# Patient Record
Sex: FEMALE | Race: WHITE | NOT HISPANIC OR LATINO | URBAN - METROPOLITAN AREA
[De-identification: names, ages, dates, MRNs, and addresses within clinical notes are randomized per-mention and may not be internally consistent; named-entity substitution may affect disease eponyms.]

---

## 2018-07-30 ENCOUNTER — EMERGENCY (EMERGENCY)
Facility: HOSPITAL | Age: 73
LOS: 0 days | Discharge: HOME | End: 2018-07-30
Attending: EMERGENCY MEDICINE | Admitting: EMERGENCY MEDICINE
Payer: MEDICARE

## 2018-07-30 VITALS
TEMPERATURE: 98 F | SYSTOLIC BLOOD PRESSURE: 180 MMHG | OXYGEN SATURATION: 100 % | HEART RATE: 62 BPM | RESPIRATION RATE: 17 BRPM | DIASTOLIC BLOOD PRESSURE: 60 MMHG

## 2018-07-30 VITALS — WEIGHT: 139.99 LBS | HEIGHT: 62 IN

## 2018-07-30 DIAGNOSIS — I10 ESSENTIAL (PRIMARY) HYPERTENSION: ICD-10-CM

## 2018-07-30 DIAGNOSIS — M54.9 DORSALGIA, UNSPECIFIED: ICD-10-CM

## 2018-07-30 DIAGNOSIS — M48.54XA COLLAPSED VERTEBRA, NOT ELSEWHERE CLASSIFIED, THORACIC REGION, INITIAL ENCOUNTER FOR FRACTURE: ICD-10-CM

## 2018-07-30 PROCEDURE — 72100 X-RAY EXAM L-S SPINE 2/3 VWS: CPT | Mod: 26

## 2018-07-30 PROCEDURE — 99283 EMERGENCY DEPT VISIT LOW MDM: CPT

## 2018-07-30 PROCEDURE — 72070 X-RAY EXAM THORAC SPINE 2VWS: CPT | Mod: 26

## 2018-07-30 RX ORDER — ACETAMINOPHEN 500 MG
1 TABLET ORAL
Qty: 15 | Refills: 0
Start: 2018-07-30 | End: 2018-08-03

## 2018-07-30 RX ORDER — CYCLOBENZAPRINE HYDROCHLORIDE 10 MG/1
1 TABLET, FILM COATED ORAL
Qty: 15 | Refills: 0
Start: 2018-07-30

## 2018-07-30 RX ORDER — CYCLOBENZAPRINE HYDROCHLORIDE 10 MG/1
5 TABLET, FILM COATED ORAL ONCE
Qty: 0 | Refills: 0 | Status: COMPLETED | OUTPATIENT
Start: 2018-07-30 | End: 2018-07-30

## 2018-07-30 RX ORDER — ACETAMINOPHEN 500 MG
650 TABLET ORAL ONCE
Qty: 0 | Refills: 0 | Status: COMPLETED | OUTPATIENT
Start: 2018-07-30 | End: 2018-07-30

## 2018-07-30 RX ADMIN — CYCLOBENZAPRINE HYDROCHLORIDE 5 MILLIGRAM(S): 10 TABLET, FILM COATED ORAL at 13:57

## 2018-07-30 RX ADMIN — Medication 650 MILLIGRAM(S): at 13:57

## 2018-07-30 NOTE — ED ADULT NURSE NOTE - CHPI ED SYMPTOMS NEG
no constipation/no fatigue/no difficulty bearing weight/no motor function loss/no neck tenderness/no anorexia/no bowel dysfunction/no bladder dysfunction/no numbness

## 2018-07-30 NOTE — ED STATDOCS - PROGRESS NOTE DETAILS
72 y/o F with PMH of HTN presents with mid-low back pain x 3 days. Pt states she first felt the pain when getting out of bed 3 days ago. No recent fall. Pain not made worse with inspiration. Denies CP, SOb, palpitations, numbness/tingling in extremities. Admits to chronic right leg pain. Pain relieved by standing, made worse with motion. Admits to having recent DEXA scan which she reports as normal. PE: NAD. Cardiac: s1/s2, RRR. Lungs: CTAB. Abdomen: soft, nontender, NBS x4. MSK: No obvious deformity, edema, erythema, ecchymosis to T or L spine. +left sided lumbar paraspinal tenderness without midline tenderness. Neuro. Sensation intact to light touch in trunk and lower extremities. Patient ambulatory with 5/5 lower extremity strength. A/P: Likely MSK. Will evaluate for compression fracture. XR thoracic and lumbar spine. Tylenol, flexeril. Reassess. Damon Jamison PA-C d/w findings with Dr. Lee. Advised bracing, pain management, follow up in office on 8/2/18. Brace currently unavailable in ED. Repeat page to Dr. Lee regarding disposition due to unavailability of brace. - Damon Jamison PA-C Unable to provide brace while in ED. D/w Dr. Lee. Stated to disposition patient home. Advised no strenuous activity, heavy lifting while at home. Follow up on 8/2 in office. - Damon Jamison PA-C

## 2018-07-30 NOTE — ED STATDOCS - ATTENDING CONTRIBUTION TO CARE
I, Bret Simms DO,  performed the initial face to face bedside interview with this patient regarding history of present illness, review of symptoms and relevant past medical, social and family history.  I completed an independent physical examination.  I was the initial provider who evaluated this patient. I have signed out the follow up of any pending tests (i.e. labs, radiological studies) to the ACP.  I have communicated the patient’s plan of care and disposition with the ACP.

## 2018-07-30 NOTE — ED STATDOCS - NS_ ATTENDINGSCRIBEDETAILS _ED_A_ED_FT
Bret Simms DO (Attending): The history, relevant review of systems, past medical and surgical history, medical decision making, and physical examination was documented by the scribe in my presence and I attest to the accuracy of the documentation.

## 2018-07-30 NOTE — ED STATDOCS - MUSCULOSKELETAL, MLM
mild right low thoracic/upper lumbar paraspinal spasm, no midline TTP, normal gait, strength 5/5 neuro intact

## 2018-07-30 NOTE — SBIRT NOTE. - NSSBIRTSERVICES_GEN_A_ED_FT
Provided SBIRT services: Full screen positive. Brief Intervention Performed. Screening results were reviewed with the patient and patient was provided information about healthy guidelines and potential negative consequences associated with level of risk. Motivation and readiness to reduce or stop use was discussed and goals and activities to make changes were suggested/offered.  Audit Score: 8  DAST Score: 0

## 2018-07-30 NOTE — ED STATDOCS - OBJECTIVE STATEMENT
72 y/o female with a PMHx of  HTN presents to the ED c/o back pain x3 days, worsening. Denies recent injury. Denies CP, SOB, fever, dysuria, LE edema. Pt has had similar sx in past. Pt also notes right leg pain x months, which she has not seen her doctor for. Back pain is relieved by standing. Pt took Advil this morning PTA. PMD- Dr. Kevin.

## 2018-09-18 PROBLEM — Z00.00 ENCOUNTER FOR PREVENTIVE HEALTH EXAMINATION: Status: ACTIVE | Noted: 2018-09-18

## 2018-09-18 PROBLEM — I10 ESSENTIAL (PRIMARY) HYPERTENSION: Chronic | Status: ACTIVE | Noted: 2018-08-09

## 2018-11-07 ENCOUNTER — APPOINTMENT (OUTPATIENT)
Dept: PHYSICAL MEDICINE AND REHAB | Facility: CLINIC | Age: 73
End: 2018-11-07

## 2019-01-23 ENCOUNTER — INPATIENT (INPATIENT)
Facility: HOSPITAL | Age: 74
LOS: 1 days | Discharge: HOME | End: 2019-01-25
Attending: FAMILY MEDICINE | Admitting: FAMILY MEDICINE
Payer: MEDICARE

## 2019-01-23 VITALS — HEIGHT: 62 IN | WEIGHT: 136.91 LBS

## 2019-01-23 LAB
ADD ON TEST-SPECIMEN IN LAB: SIGNIFICANT CHANGE UP
ALBUMIN SERPL ELPH-MCNC: 3.1 G/DL — LOW (ref 3.3–5)
ALP SERPL-CCNC: 105 U/L — SIGNIFICANT CHANGE UP (ref 40–120)
ALT FLD-CCNC: 35 U/L — SIGNIFICANT CHANGE UP (ref 12–78)
AMYLASE P1 CFR SERPL: 37 U/L — SIGNIFICANT CHANGE UP (ref 25–115)
ANION GAP SERPL CALC-SCNC: 13 MMOL/L — SIGNIFICANT CHANGE UP (ref 5–17)
APPEARANCE UR: CLEAR — SIGNIFICANT CHANGE UP
AST SERPL-CCNC: 42 U/L — HIGH (ref 15–37)
BACTERIA # UR AUTO: ABNORMAL
BASOPHILS # BLD AUTO: 0.06 K/UL — SIGNIFICANT CHANGE UP (ref 0–0.2)
BASOPHILS NFR BLD AUTO: 0.5 % — SIGNIFICANT CHANGE UP (ref 0–2)
BILIRUB SERPL-MCNC: 0.8 MG/DL — SIGNIFICANT CHANGE UP (ref 0.2–1.2)
BILIRUB UR-MCNC: NEGATIVE — SIGNIFICANT CHANGE UP
BUN SERPL-MCNC: 61 MG/DL — HIGH (ref 7–23)
CALCIUM SERPL-MCNC: 9.1 MG/DL — SIGNIFICANT CHANGE UP (ref 8.5–10.1)
CHLORIDE SERPL-SCNC: 100 MMOL/L — SIGNIFICANT CHANGE UP (ref 96–108)
CK SERPL-CCNC: 732 U/L — HIGH (ref 26–192)
CO2 SERPL-SCNC: 19 MMOL/L — LOW (ref 22–31)
COLOR SPEC: YELLOW — SIGNIFICANT CHANGE UP
COMMENT - URINE: SIGNIFICANT CHANGE UP
CREAT SERPL-MCNC: 2.87 MG/DL — HIGH (ref 0.5–1.3)
DIFF PNL FLD: ABNORMAL
EOSINOPHIL # BLD AUTO: 0.22 K/UL — SIGNIFICANT CHANGE UP (ref 0–0.5)
EOSINOPHIL NFR BLD AUTO: 1.7 % — SIGNIFICANT CHANGE UP (ref 0–6)
EPI CELLS # UR: SIGNIFICANT CHANGE UP
GLUCOSE SERPL-MCNC: 120 MG/DL — HIGH (ref 70–99)
GLUCOSE UR QL: 50 MG/DL
HCT VFR BLD CALC: 31.3 % — LOW (ref 34.5–45)
HGB BLD-MCNC: 10.4 G/DL — LOW (ref 11.5–15.5)
IMM GRANULOCYTES NFR BLD AUTO: 0.5 % — SIGNIFICANT CHANGE UP (ref 0–1.5)
INR BLD: 1.13 RATIO — SIGNIFICANT CHANGE UP (ref 0.88–1.16)
KETONES UR-MCNC: NEGATIVE — SIGNIFICANT CHANGE UP
LEUKOCYTE ESTERASE UR-ACNC: NEGATIVE — SIGNIFICANT CHANGE UP
LIDOCAIN IGE QN: 221 U/L — SIGNIFICANT CHANGE UP (ref 73–393)
LYMPHOCYTES # BLD AUTO: 1.51 K/UL — SIGNIFICANT CHANGE UP (ref 1–3.3)
LYMPHOCYTES # BLD AUTO: 11.8 % — LOW (ref 13–44)
MAGNESIUM SERPL-MCNC: 1.6 MG/DL — SIGNIFICANT CHANGE UP (ref 1.6–2.6)
MCHC RBC-ENTMCNC: 30.4 PG — SIGNIFICANT CHANGE UP (ref 27–34)
MCHC RBC-ENTMCNC: 33.2 GM/DL — SIGNIFICANT CHANGE UP (ref 32–36)
MCV RBC AUTO: 91.5 FL — SIGNIFICANT CHANGE UP (ref 80–100)
MONOCYTES # BLD AUTO: 1.15 K/UL — HIGH (ref 0–0.9)
MONOCYTES NFR BLD AUTO: 9 % — SIGNIFICANT CHANGE UP (ref 2–14)
NEUTROPHILS # BLD AUTO: 9.74 K/UL — HIGH (ref 1.8–7.4)
NEUTROPHILS NFR BLD AUTO: 76.5 % — SIGNIFICANT CHANGE UP (ref 43–77)
NITRITE UR-MCNC: NEGATIVE — SIGNIFICANT CHANGE UP
NRBC # BLD: 0 /100 WBCS — SIGNIFICANT CHANGE UP (ref 0–0)
PH UR: 6 — SIGNIFICANT CHANGE UP (ref 5–8)
PHOSPHATE SERPL-MCNC: 4.6 MG/DL — HIGH (ref 2.5–4.5)
PLATELET # BLD AUTO: 238 K/UL — SIGNIFICANT CHANGE UP (ref 150–400)
POTASSIUM SERPL-MCNC: 3.9 MMOL/L — SIGNIFICANT CHANGE UP (ref 3.5–5.3)
POTASSIUM SERPL-SCNC: 3.9 MMOL/L — SIGNIFICANT CHANGE UP (ref 3.5–5.3)
PROT SERPL-MCNC: 7.7 GM/DL — SIGNIFICANT CHANGE UP (ref 6–8.3)
PROT UR-MCNC: 30 MG/DL
PROTHROM AB SERPL-ACNC: 12.6 SEC — SIGNIFICANT CHANGE UP (ref 10–12.9)
RBC # BLD: 3.42 M/UL — LOW (ref 3.8–5.2)
RBC # FLD: 12.4 % — SIGNIFICANT CHANGE UP (ref 10.3–14.5)
RBC CASTS # UR COMP ASSIST: ABNORMAL /HPF (ref 0–4)
SODIUM SERPL-SCNC: 132 MMOL/L — LOW (ref 135–145)
SP GR SPEC: 1.01 — SIGNIFICANT CHANGE UP (ref 1.01–1.02)
UROBILINOGEN FLD QL: NEGATIVE MG/DL — SIGNIFICANT CHANGE UP
WBC # BLD: 12.75 K/UL — HIGH (ref 3.8–10.5)
WBC # FLD AUTO: 12.75 K/UL — HIGH (ref 3.8–10.5)
WBC UR QL: ABNORMAL

## 2019-01-23 PROCEDURE — 71045 X-RAY EXAM CHEST 1 VIEW: CPT | Mod: 26

## 2019-01-23 PROCEDURE — 74019 RADEX ABDOMEN 2 VIEWS: CPT | Mod: 26

## 2019-01-23 PROCEDURE — 99285 EMERGENCY DEPT VISIT HI MDM: CPT

## 2019-01-23 PROCEDURE — 76705 ECHO EXAM OF ABDOMEN: CPT | Mod: 26

## 2019-01-23 RX ORDER — MORPHINE SULFATE 50 MG/1
2 CAPSULE, EXTENDED RELEASE ORAL EVERY 4 HOURS
Qty: 0 | Refills: 0 | Status: DISCONTINUED | OUTPATIENT
Start: 2019-01-23 | End: 2019-01-25

## 2019-01-23 RX ORDER — OXYCODONE AND ACETAMINOPHEN 5; 325 MG/1; MG/1
1 TABLET ORAL ONCE
Qty: 0 | Refills: 0 | Status: DISCONTINUED | OUTPATIENT
Start: 2019-01-23 | End: 2019-01-23

## 2019-01-23 RX ORDER — ENOXAPARIN SODIUM 100 MG/ML
40 INJECTION SUBCUTANEOUS EVERY 24 HOURS
Qty: 0 | Refills: 0 | Status: DISCONTINUED | OUTPATIENT
Start: 2019-01-23 | End: 2019-01-23

## 2019-01-23 RX ORDER — METOPROLOL TARTRATE 50 MG
100 TABLET ORAL DAILY
Qty: 0 | Refills: 0 | Status: DISCONTINUED | OUTPATIENT
Start: 2019-01-23 | End: 2019-01-25

## 2019-01-23 RX ORDER — HEPARIN SODIUM 5000 [USP'U]/ML
5000 INJECTION INTRAVENOUS; SUBCUTANEOUS EVERY 12 HOURS
Qty: 0 | Refills: 0 | Status: DISCONTINUED | OUTPATIENT
Start: 2019-01-23 | End: 2019-01-25

## 2019-01-23 RX ORDER — ONDANSETRON 8 MG/1
4 TABLET, FILM COATED ORAL EVERY 6 HOURS
Qty: 0 | Refills: 0 | Status: DISCONTINUED | OUTPATIENT
Start: 2019-01-23 | End: 2019-01-25

## 2019-01-23 RX ORDER — CYCLOBENZAPRINE HYDROCHLORIDE 10 MG/1
5 TABLET, FILM COATED ORAL THREE TIMES A DAY
Qty: 0 | Refills: 0 | Status: DISCONTINUED | OUTPATIENT
Start: 2019-01-23 | End: 2019-01-25

## 2019-01-23 RX ORDER — LANOLIN ALCOHOL/MO/W.PET/CERES
3 CREAM (GRAM) TOPICAL ONCE
Qty: 0 | Refills: 0 | Status: COMPLETED | OUTPATIENT
Start: 2019-01-23 | End: 2019-01-23

## 2019-01-23 RX ORDER — SODIUM CHLORIDE 9 MG/ML
1000 INJECTION INTRAMUSCULAR; INTRAVENOUS; SUBCUTANEOUS ONCE
Qty: 0 | Refills: 0 | Status: COMPLETED | OUTPATIENT
Start: 2019-01-23 | End: 2019-01-23

## 2019-01-23 RX ORDER — ACETAMINOPHEN 500 MG
650 TABLET ORAL EVERY 6 HOURS
Qty: 0 | Refills: 0 | Status: DISCONTINUED | OUTPATIENT
Start: 2019-01-23 | End: 2019-01-25

## 2019-01-23 RX ORDER — SODIUM CHLORIDE 9 MG/ML
1000 INJECTION INTRAMUSCULAR; INTRAVENOUS; SUBCUTANEOUS
Qty: 0 | Refills: 0 | Status: DISCONTINUED | OUTPATIENT
Start: 2019-01-23 | End: 2019-01-24

## 2019-01-23 RX ADMIN — MORPHINE SULFATE 2 MILLIGRAM(S): 50 CAPSULE, EXTENDED RELEASE ORAL at 18:44

## 2019-01-23 RX ADMIN — OXYCODONE AND ACETAMINOPHEN 1 TABLET(S): 5; 325 TABLET ORAL at 15:47

## 2019-01-23 RX ADMIN — MORPHINE SULFATE 2 MILLIGRAM(S): 50 CAPSULE, EXTENDED RELEASE ORAL at 23:37

## 2019-01-23 RX ADMIN — OXYCODONE AND ACETAMINOPHEN 1 TABLET(S): 5; 325 TABLET ORAL at 16:17

## 2019-01-23 RX ADMIN — Medication 100 MILLIGRAM(S): at 23:37

## 2019-01-23 RX ADMIN — SODIUM CHLORIDE 1000 MILLILITER(S): 9 INJECTION INTRAMUSCULAR; INTRAVENOUS; SUBCUTANEOUS at 10:07

## 2019-01-23 RX ADMIN — SODIUM CHLORIDE 1000 MILLILITER(S): 9 INJECTION INTRAMUSCULAR; INTRAVENOUS; SUBCUTANEOUS at 11:08

## 2019-01-23 NOTE — ED ADULT NURSE REASSESSMENT NOTE - NS ED NURSE REASSESS COMMENT FT1
Patient report pain starting to come back. Morphine given. patient states that she does not want menu for dinner a this time. Will continue to monitor.

## 2019-01-23 NOTE — H&P ADULT - HISTORY OF PRESENT ILLNESS
Patient is a 73 y/o female with PMHx of HTN p/w abdominal pains and outpatient  labs inidcating rising renal function. Patient had abdominal pains starting 3 days ago, went to see Dr. Kevin 2 days ago for abdominal pains. She was sent for outpatient CT abdominal which showed no acute findings. However her blood work showed elevated renal function, and patient was adivsed to come to ER for further workup. Pt also reports intermittent NVD, and poor PO intake. +Weakness. +urinary urgency with incomplete emptying. Reports drinking 1 small bottle a day. Denies hematuria, new back pain, fever. Patient is a 73 y/o female with PMHx of HTN p/w abdominal pains and outpatient  labs inidcating rising renal function. Patient had abdominal pains starting 3 days ago, went to see Dr. Kevin 2 days ago for abdominal pains. Patient states initally these syptoms started 12/25, and since then intermittent vague abdominal pains. Sometimes, ruq, and sometimes luq. She was sent for outpatient CT abdominal which showed no acute findings. However her blood work showed elevated renal function, and patient was adivsed to come to ER for further workup. Pt also reports intermittent NVD, and poor PO intake. +urinary urgency with incomplete emptying. States she has no appetite and is continuously nauseated and has not been drinking enough water. She denies any fevers, chills, chest pain, sob.

## 2019-01-23 NOTE — ED ADULT TRIAGE NOTE - CHIEF COMPLAINT QUOTE
patient sent by dr mauro for evaluation of abnormal labs.  Patient was prescreened for CT scan of abdomen and told kidney function was abnormal and to come to ER.  Patient has had nausea, abdominal pain and anorexia since [place].  She denies fever, + weakness and dry heaves.  Patient has been taking Tylenol and aleve for the abdominal pain with some relief.  Last dose 3 am

## 2019-01-23 NOTE — H&P ADULT - ASSESSMENT
Patient is a 75 y/o female with PMHx of HTN p/w abdominal pains and outpatient  labs inidcating rising renal function. Patient had abdominal pains starting 3 days ago, went to see Dr. Kevin 2 days ago for abdominal pains. She was sent for outpatient CT abdominal which showed no acute findings. However her blood work showed elevated renal function, and patient was adivsed to come to ER for further workup. Pt also reports intermittent NVD, and poor PO intake. +Weakness. +urinary urgency with incomplete emptying. Reports drinking 1 small bottle a day. Denies hematuria, new back pain, fever. Patient is a 73 y/o female with PMHx of HTN p/w abdominal pains and outpatient  labs inidcating rising renal function. Patient had abdominal pains starting 3 days ago, went to see Dr. Kevin 2 days ago for abdominal pains. Patient states initally these syptoms started 12/25, and since then intermittent vague abdominal pains. Sometimes, ruq, and sometimes luq. She was sent for outpatient CT abdominal which showed no acute findings. However her blood work showed elevated renal function, and patient was adivsed to come to ER for further workup. Pt also reports intermittent NVD, and poor PO intake. +urinary urgency with incomplete emptying. States she has no appetite and is continuously nauseated and has not been drinking enough water. She denies any fevers, chills, chest pain, sob.     Found to have creatine outpatient of 3.8. Current 2.87. WBC of 12.75.     1-Abdominal pain possibly seconary to passed stone vs gastric etiology- ulcer or mass?  -patient will likley need EGD, either in this admission or in near future  -Dr. Godoy to consult  -CT outpatient was unremarkable/ US in ER was unremarkable  -will order xray 2 view r/o sbo or illeus (very poor gi motility sounds)    2-ARF  -improved already since outpatient blood work  -start IV fluids ns@100cc/hr for the next 24 hours    3-HTN:  -hold ace arb  -c/w lopressor    4-DVT prophy- sc heparin Patient is a 75 y/o female with PMHx of HTN p/w abdominal pains and outpatient  labs inidcating rising renal function. Patient had abdominal pains starting 3 days ago, went to see Dr. Kevin 2 days ago for abdominal pains. Patient states initally these syptoms started 12/25, and since then intermittent vague abdominal pains. Sometimes, ruq, and sometimes luq. She was sent for outpatient CT abdominal which showed no acute findings. However her blood work showed elevated renal function, and patient was adivsed to come to ER for further workup. Pt also reports intermittent NVD, and poor PO intake. +urinary urgency with incomplete emptying. States she has no appetite and is continuously nauseated and has not been drinking enough water. She denies any fevers, chills, chest pain, sob.     Found to have creatine outpatient of 3.8. Current 2.87. WBC of 12.75.     1-Abdominal pain possibly seconary to passed stone vs gastric etiology- ulcer or mass?  -patient will likley need EGD, either in this admission or in near future  -Dr. Godoy to consult  -CT outpatient was unremarkable/ US in ER was unremarkable  -will order xray 2 view r/o sbo or illeus (very poor gi motility sounds)    2-ARF  -improved already since outpatient blood work  -start IV fluids ns@100cc/hr for the next 24 hours  -note: patient has been taking oral ibuprophen, aleve at home daily for pain    3-HTN:  -hold ace arb  -c/w lopressor    4-DVT prophy- sc heparin

## 2019-01-23 NOTE — H&P ADULT - NSHPLABSRESULTS_GEN_ALL_CORE
Urinalysis Basic - ( 2019 13:08 )    Color: Yellow / Appearance: Clear / S.015 / pH: x  Gluc: x / Ketone: Negative  / Bili: Negative / Urobili: Negative mg/dL   Blood: x / Protein: 30 mg/dL / Nitrite: Negative   Leuk Esterase: Negative / RBC: 3-5 /HPF / WBC 6-10   Sq Epi: x / Non Sq Epi: Few / Bacteria: Few    2019 10:30    132    |  100    |  61     ----------------------------<  120    3.9     |  19     |  2.87     Ca    9.1        2019 10:30  Phos  4.6       2019 10:30  Mg     1.6       2019 10:30    TPro  7.7    /  Alb  3.1    /  TBili  0.8    /  DBili  x      /  AST  42     /  ALT  35     /  AlkPhos  105    2019 10:30  LIVER FUNCTIONS - ( 2019 10:30 )  Alb: 3.1 g/dL / Pro: 7.7 gm/dL / ALK PHOS: 105 U/L / ALT: 35 U/L / AST: 42 U/L / GGT: x         PT/INR - ( 2019 10:30 )   PT: 12.6 sec;   INR: 1.13 ratio         CBC Full  -  ( 2019 10:30 )  WBC Count : 12.75 K/uL  Hemoglobin : 10.4 g/dL  Hematocrit : 31.3 %  Platelet Count - Automated : 238 K/uL  Mean Cell Volume : 91.5 fl  Mean Cell Hemoglobin : 30.4 pg  Mean Cell Hemoglobin Concentration : 33.2 gm/dL  Auto Neutrophil # : 9.74 K/uL  Auto Lymphocyte # : 1.51 K/uL  Auto Monocyte # : 1.15 K/uL  Auto Eosinophil # : 0.22 K/uL  Auto Basophil # : 0.06 K/uL  Auto Neutrophil % : 76.5 %  Auto Lymphocyte % : 11.8 %  Auto Monocyte % : 9.0 %  Auto Eosinophil % : 1.7 %  Auto Basophil % : 0.5 %  CARDIAC MARKERS ( 2019 10:30 )  x     / x     / 732 U/L / x     / x

## 2019-01-23 NOTE — ED ADULT NURSE NOTE - NSIMPLEMENTINTERV_GEN_ALL_ED
Implemented All Universal Safety Interventions:  [place] to call system. Call bell, personal items and telephone within reach. Instruct patient to call for assistance. Room bathroom lighting operational. Non-slip footwear when patient is off stretcher. Physically safe environment: no spills, clutter or unnecessary equipment. Stretcher in lowest position, wheels locked, appropriate side rails in place.

## 2019-01-23 NOTE — ED PROVIDER NOTE - MEDICAL DECISION MAKING DETAILS
Pt presenting with new renal failure will recheck labs, CPK, fluids, US RUQ due to abd TTP. Will need admission.

## 2019-01-23 NOTE — ED STATDOCS - PROGRESS NOTE DETAILS
75 yo hx of HTN, HLD, presents with CC of poor PO intake.  C/o weakness, fatigue, poor PO intake, lightheadedness.  No real meal since [place], 2/2 nausea, "can't get it down", but denies pain with eating.  Pt was seen by Dr. Kevin had labs drawn and CT abdomen.  Labs showed Cr 3.8, K 6.0, ESR 99, CK 1200.  CT A/P demonstrated GI thickening.  Sent by Dr. Arce office for further evaluation.  .sig

## 2019-01-23 NOTE — ED ADULT NURSE REASSESSMENT NOTE - NS ED NURSE REASSESS COMMENT FT1
report given to Starr RN on [facility]. vss. no s/s of acute distress noted. denies pain. awaiting transport

## 2019-01-23 NOTE — H&P ADULT - NSHPPHYSICALEXAM_GEN_ALL_CORE
Vital Signs Last 24 Hrs  T(C): 36.7 (23 Jan 2019 09:41), Max: 36.7 (23 Jan 2019 09:41)  T(F): 98.1 (23 Jan 2019 09:41), Max: 98.1 (23 Jan 2019 09:41)  HR: 101 (23 Jan 2019 09:41) (101 - 101)  BP: 148/67 (23 Jan 2019 09:41) (148/67 - 148/67)  BP(mean): --  RR: 18 (23 Jan 2019 09:41) (18 - 18)  SpO2: 100% (23 Jan 2019 09:41) (100% - 100%)    HEENT:   pupils equal and reactive, EOMI, no oropharyngeal lesions, erythema, exudates, oral thrush    NECK:   supple, no carotid bruits, no palpable lymph nodes, no thyromegaly    CV:  +S1, +S2, regular, no murmurs or rubs    RESP:   lungs clear to auscultation bilaterally, no wheezing, rales, rhonchi, good air entry bilaterally    BREAST:  not examined    GI:  abdomen soft, non-tender, non-distended, normal BS, no bruits, no abdominal masses, no palpable masses    RECTAL:  not examined    :  not examined    MSK:   normal muscle tone, no atrophy, no rigidity, no contractions    EXT:   no clubbing, no cyanosis, no edema, no calf pain, swelling or erythema    VASCULAR:  pulses equal and symmetric in the upper and lower extremities    NEURO:  AAOX3, no focal neurological deficits, follows all commands, able to move extremities spontaneously    SKIN:  no ulcers, lesions or rashes

## 2019-01-24 LAB
ANION GAP SERPL CALC-SCNC: 9 MMOL/L — SIGNIFICANT CHANGE UP (ref 5–17)
BASOPHILS # BLD AUTO: 0.08 K/UL — SIGNIFICANT CHANGE UP (ref 0–0.2)
BASOPHILS NFR BLD AUTO: 0.8 % — SIGNIFICANT CHANGE UP (ref 0–2)
BUN SERPL-MCNC: 42 MG/DL — HIGH (ref 7–23)
CALCIUM SERPL-MCNC: 8.8 MG/DL — SIGNIFICANT CHANGE UP (ref 8.5–10.1)
CHLORIDE SERPL-SCNC: 105 MMOL/L — SIGNIFICANT CHANGE UP (ref 96–108)
CO2 SERPL-SCNC: 21 MMOL/L — LOW (ref 22–31)
CREAT SERPL-MCNC: 1.96 MG/DL — HIGH (ref 0.5–1.3)
EOSINOPHIL # BLD AUTO: 0.4 K/UL — SIGNIFICANT CHANGE UP (ref 0–0.5)
EOSINOPHIL NFR BLD AUTO: 3.9 % — SIGNIFICANT CHANGE UP (ref 0–6)
GLUCOSE SERPL-MCNC: 103 MG/DL — HIGH (ref 70–99)
HCT VFR BLD CALC: 26 % — LOW (ref 34.5–45)
HCV AB S/CO SERPL IA: 0.11 S/CO — SIGNIFICANT CHANGE UP
HCV AB SERPL-IMP: SIGNIFICANT CHANGE UP
HGB BLD-MCNC: 8.5 G/DL — LOW (ref 11.5–15.5)
IMM GRANULOCYTES NFR BLD AUTO: 0.5 % — SIGNIFICANT CHANGE UP (ref 0–1.5)
LYMPHOCYTES # BLD AUTO: 2.34 K/UL — SIGNIFICANT CHANGE UP (ref 1–3.3)
LYMPHOCYTES # BLD AUTO: 23 % — SIGNIFICANT CHANGE UP (ref 13–44)
MCHC RBC-ENTMCNC: 30.5 PG — SIGNIFICANT CHANGE UP (ref 27–34)
MCHC RBC-ENTMCNC: 32.7 GM/DL — SIGNIFICANT CHANGE UP (ref 32–36)
MCV RBC AUTO: 93.2 FL — SIGNIFICANT CHANGE UP (ref 80–100)
MONOCYTES # BLD AUTO: 1.13 K/UL — HIGH (ref 0–0.9)
MONOCYTES NFR BLD AUTO: 11.1 % — SIGNIFICANT CHANGE UP (ref 2–14)
NEUTROPHILS # BLD AUTO: 6.19 K/UL — SIGNIFICANT CHANGE UP (ref 1.8–7.4)
NEUTROPHILS NFR BLD AUTO: 60.7 % — SIGNIFICANT CHANGE UP (ref 43–77)
NRBC # BLD: 0 /100 WBCS — SIGNIFICANT CHANGE UP (ref 0–0)
PLATELET # BLD AUTO: 218 K/UL — SIGNIFICANT CHANGE UP (ref 150–400)
POTASSIUM SERPL-MCNC: 3.7 MMOL/L — SIGNIFICANT CHANGE UP (ref 3.5–5.3)
POTASSIUM SERPL-SCNC: 3.7 MMOL/L — SIGNIFICANT CHANGE UP (ref 3.5–5.3)
RBC # BLD: 2.79 M/UL — LOW (ref 3.8–5.2)
RBC # FLD: 12.6 % — SIGNIFICANT CHANGE UP (ref 10.3–14.5)
SODIUM SERPL-SCNC: 135 MMOL/L — SIGNIFICANT CHANGE UP (ref 135–145)
WBC # BLD: 10.19 K/UL — SIGNIFICANT CHANGE UP (ref 3.8–10.5)
WBC # FLD AUTO: 10.19 K/UL — SIGNIFICANT CHANGE UP (ref 3.8–10.5)

## 2019-01-24 PROCEDURE — 93010 ELECTROCARDIOGRAM REPORT: CPT

## 2019-01-24 RX ORDER — PANTOPRAZOLE SODIUM 20 MG/1
40 TABLET, DELAYED RELEASE ORAL
Qty: 0 | Refills: 0 | Status: DISCONTINUED | OUTPATIENT
Start: 2019-01-24 | End: 2019-01-25

## 2019-01-24 RX ORDER — SODIUM CHLORIDE 9 MG/ML
1000 INJECTION INTRAMUSCULAR; INTRAVENOUS; SUBCUTANEOUS
Qty: 0 | Refills: 0 | Status: DISCONTINUED | OUTPATIENT
Start: 2019-01-24 | End: 2019-01-25

## 2019-01-24 RX ORDER — SUCRALFATE 1 G
1 TABLET ORAL
Qty: 0 | Refills: 0 | Status: DISCONTINUED | OUTPATIENT
Start: 2019-01-24 | End: 2019-01-24

## 2019-01-24 RX ORDER — SODIUM CHLORIDE 9 MG/ML
1000 INJECTION INTRAMUSCULAR; INTRAVENOUS; SUBCUTANEOUS
Qty: 0 | Refills: 0 | Status: DISCONTINUED | OUTPATIENT
Start: 2019-01-24 | End: 2019-01-24

## 2019-01-24 RX ADMIN — MORPHINE SULFATE 2 MILLIGRAM(S): 50 CAPSULE, EXTENDED RELEASE ORAL at 06:34

## 2019-01-24 RX ADMIN — SODIUM CHLORIDE 100 MILLILITER(S): 9 INJECTION INTRAMUSCULAR; INTRAVENOUS; SUBCUTANEOUS at 11:13

## 2019-01-24 RX ADMIN — SODIUM CHLORIDE 100 MILLILITER(S): 9 INJECTION INTRAMUSCULAR; INTRAVENOUS; SUBCUTANEOUS at 12:32

## 2019-01-24 RX ADMIN — MORPHINE SULFATE 2 MILLIGRAM(S): 50 CAPSULE, EXTENDED RELEASE ORAL at 16:08

## 2019-01-24 RX ADMIN — Medication 650 MILLIGRAM(S): at 12:32

## 2019-01-24 RX ADMIN — PANTOPRAZOLE SODIUM 40 MILLIGRAM(S): 20 TABLET, DELAYED RELEASE ORAL at 17:15

## 2019-01-24 RX ADMIN — Medication 1 GRAM(S): at 12:07

## 2019-01-24 RX ADMIN — HEPARIN SODIUM 5000 UNIT(S): 5000 INJECTION INTRAVENOUS; SUBCUTANEOUS at 06:01

## 2019-01-24 RX ADMIN — MORPHINE SULFATE 2 MILLIGRAM(S): 50 CAPSULE, EXTENDED RELEASE ORAL at 11:00

## 2019-01-24 RX ADMIN — ONDANSETRON 4 MILLIGRAM(S): 8 TABLET, FILM COATED ORAL at 15:10

## 2019-01-24 RX ADMIN — Medication 100 MILLIGRAM(S): at 11:01

## 2019-01-24 RX ADMIN — SODIUM CHLORIDE 100 MILLILITER(S): 9 INJECTION INTRAMUSCULAR; INTRAVENOUS; SUBCUTANEOUS at 12:33

## 2019-01-24 RX ADMIN — Medication 3 MILLIGRAM(S): at 00:27

## 2019-01-24 RX ADMIN — Medication 650 MILLIGRAM(S): at 13:42

## 2019-01-24 RX ADMIN — SODIUM CHLORIDE 100 MILLILITER(S): 9 INJECTION INTRAMUSCULAR; INTRAVENOUS; SUBCUTANEOUS at 00:26

## 2019-01-24 RX ADMIN — MORPHINE SULFATE 2 MILLIGRAM(S): 50 CAPSULE, EXTENDED RELEASE ORAL at 18:14

## 2019-01-24 RX ADMIN — MORPHINE SULFATE 2 MILLIGRAM(S): 50 CAPSULE, EXTENDED RELEASE ORAL at 13:42

## 2019-01-24 RX ADMIN — HEPARIN SODIUM 5000 UNIT(S): 5000 INJECTION INTRAVENOUS; SUBCUTANEOUS at 17:15

## 2019-01-24 NOTE — PROGRESS NOTE ADULT - SUBJECTIVE AND OBJECTIVE BOX
CHIEF COMPLAINT/Diagnosis: abdominal pain/ urinary urgency    SUBJECTIVE: mild abdom pain    REVIEW OF SYSTEMS:    CONSTITUTIONAL: No weakness, fevers or chills  EYES/ENT: No visual changes;  No vertigo or throat pain   NECK: No pain or stiffness  RESPIRATORY: No cough, wheezing, hemoptysis; No shortness of breath  CARDIOVASCULAR: No chest pain or palpitations  GASTROINTESTINAL: No abdominal or epigastric pain. No nausea, vomiting, or hematemesis; No diarrhea or constipation. No melena or hematochezia.  GENITOURINARY: No dysuria, frequency or hematuria  NEUROLOGICAL: No numbness or weakness  SKIN: No itching, burning, rashes, or lesions   All other review of systems is negative unless indicated above    Vital Signs Last 24 Hrs  T(C): 36.8 (24 Jan 2019 05:29), Max: 37.1 (24 Jan 2019 00:00)  T(F): 98.3 (24 Jan 2019 05:29), Max: 98.7 (24 Jan 2019 00:00)  HR: 73 (24 Jan 2019 05:29) (73 - 108)  BP: 112/50 (24 Jan 2019 05:29) (112/50 - 150/84)  BP(mean): --  RR: 18 (24 Jan 2019 05:29) (16 - 18)  SpO2: 97% (24 Jan 2019 05:29) (97% - 100%)    I&O's Summary      CAPILLARY BLOOD GLUCOSE          PHYSICAL EXAM:    Constitutional: NAD, awake and alert, well-developed  HEENT: PERR, EOMI, Normal Hearing, MMM  Neck: Soft and supple, No LAD, No JVD  Respiratory: Breath sounds are clear bilaterally, No wheezing, rales or rhonchi  Cardiovascular: S1 and S2, regular rate and rhythm, no Murmurs, gallops or rubs  Gastrointestinal: Bowel Sounds present, soft, nontender, nondistended, no guarding, no rebound  Extremities: No peripheral edema  Vascular: 2+ peripheral pulses  Neurological: A/O x 3, no focal deficits  Musculoskeletal: 5/5 strength b/l upper and lower extremities  Skin: No rashes    MEDICATIONS:  MEDICATIONS  (STANDING):  heparin  Injectable 5000 Unit(s) SubCutaneous every 12 hours  metoprolol succinate  milliGRAM(s) Oral daily  sodium chloride 0.9%. 1000 milliLiter(s) (100 mL/Hr) IV Continuous <Continuous>      LABS: All Labs Reviewed:                        10.4   12.75 )-----------( 238      ( 23 Jan 2019 10:30 )             31.3     01-23    132<L>  |  100  |  61<H>  ----------------------------<  120<H>  3.9   |  19<L>  |  2.87<H>    Ca    9.1      23 Jan 2019 10:30  Phos  4.6     01-23  Mg     1.6     01-23    TPro  7.7  /  Alb  3.1<L>  /  TBili  0.8  /  DBili  x   /  AST  42<H>  /  ALT  35  /  AlkPhos  105  01-23    PT/INR - ( 23 Jan 2019 10:30 )   PT: 12.6 sec;   INR: 1.13 ratio           CARDIAC MARKERS ( 23 Jan 2019 10:30 )  x     / x     / 732 U/L / x     / x                Assessment and Plan:       Patient is a 73 y/o female with PMHx of HTN p/w abdominal pains and outpatient  labs inidcating rising renal function. Patient had abdominal pains starting 3 days ago, went to see Dr. Kevin 2 days ago for abdominal pains. Patient states initally these syptoms started 12/25, and since then intermittent vague abdominal pains. Sometimes, ruq, and sometimes luq. She was sent for outpatient CT abdominal which showed no acute findings. However her blood work showed elevated renal function, and patient was adivsed to come to ER for further workup. Pt also reports intermittent NVD, and poor PO intake. +urinary urgency with incomplete emptying. States she has no appetite and is continuously nauseated and has not been drinking enough water. She denies any fevers, chills, chest pain, sob.     Found to have creatine outpatient of 3.8. Current 2.87. WBC of 12.75.     1-Abdominal pain possibly seconary to passed stone vs gastric etiology- ulcer or mass?  -patient will likley need EGD, either in this admission or in near future  -Dr. Godoy to consult  -CT outpatient was unremarkable/ US in ER was unremarkable  - 2 view abdominal xray with oral contrast normal    2-ARF  -improved already since outpatient blood work  -c/w IV fluids ns@100cc/hr for the next 24 hours  -note: patient has been taking oral ibuprophen, aleve at home daily for pain    3-HTN:  -hold ace arb  -c/w lopressor    4-DVT prophy- sc heparin CHIEF COMPLAINT/Diagnosis: abdominal pain/ urinary urgency    SUBJECTIVE: mild abdom pain    REVIEW OF SYSTEMS:    CONSTITUTIONAL: No weakness, fevers or chills  EYES/ENT: No visual changes;  No vertigo or throat pain   NECK: No pain or stiffness  RESPIRATORY: No cough, wheezing, hemoptysis; No shortness of breath  CARDIOVASCULAR: No chest pain or palpitations  GASTROINTESTINAL: No abdominal or epigastric pain. No nausea, vomiting, or hematemesis; No diarrhea or constipation. No melena or hematochezia.  GENITOURINARY: No dysuria, frequency or hematuria  NEUROLOGICAL: No numbness or weakness  SKIN: No itching, burning, rashes, or lesions   All other review of systems is negative unless indicated above    Vital Signs Last 24 Hrs  T(C): 36.8 (24 Jan 2019 05:29), Max: 37.1 (24 Jan 2019 00:00)  T(F): 98.3 (24 Jan 2019 05:29), Max: 98.7 (24 Jan 2019 00:00)  HR: 73 (24 Jan 2019 05:29) (73 - 108)  BP: 112/50 (24 Jan 2019 05:29) (112/50 - 150/84)  BP(mean): --  RR: 18 (24 Jan 2019 05:29) (16 - 18)  SpO2: 97% (24 Jan 2019 05:29) (97% - 100%)    I&O's Summary      CAPILLARY BLOOD GLUCOSE          PHYSICAL EXAM:    Constitutional: NAD, awake and alert, well-developed  HEENT: PERR, EOMI, Normal Hearing, MMM  Neck: Soft and supple, No LAD, No JVD  Respiratory: Breath sounds are clear bilaterally, No wheezing, rales or rhonchi  Cardiovascular: S1 and S2, regular rate and rhythm, no Murmurs, gallops or rubs  Gastrointestinal: Bowel Sounds present, soft, nontender, nondistended, no guarding, no rebound  Extremities: No peripheral edema  Vascular: 2+ peripheral pulses  Neurological: A/O x 3, no focal deficits  Musculoskeletal: 5/5 strength b/l upper and lower extremities  Skin: No rashes    MEDICATIONS:  MEDICATIONS  (STANDING):  heparin  Injectable 5000 Unit(s) SubCutaneous every 12 hours  metoprolol succinate  milliGRAM(s) Oral daily  sodium chloride 0.9%. 1000 milliLiter(s) (100 mL/Hr) IV Continuous <Continuous>      LABS: All Labs Reviewed:                        10.4   12.75 )-----------( 238      ( 23 Jan 2019 10:30 )             31.3     01-23    132<L>  |  100  |  61<H>  ----------------------------<  120<H>  3.9   |  19<L>  |  2.87<H>    Ca    9.1      23 Jan 2019 10:30  Phos  4.6     01-23  Mg     1.6     01-23    TPro  7.7  /  Alb  3.1<L>  /  TBili  0.8  /  DBili  x   /  AST  42<H>  /  ALT  35  /  AlkPhos  105  01-23    PT/INR - ( 23 Jan 2019 10:30 )   PT: 12.6 sec;   INR: 1.13 ratio           CARDIAC MARKERS ( 23 Jan 2019 10:30 )  x     / x     / 732 U/L / x     / x                Assessment and Plan:       Patient is a 75 y/o female with PMHx of HTN p/w abdominal pains and outpatient  labs inidcating rising renal function. Patient had abdominal pains starting 3 days ago, went to see Dr. Kevin 2 days ago for abdominal pains. Patient states initally these syptoms started 12/25, and since then intermittent vague abdominal pains. Sometimes, ruq, and sometimes luq. She was sent for outpatient CT abdominal which showed no acute findings. However her blood work showed elevated renal function, and patient was adivsed to come to ER for further workup. Pt also reports intermittent NVD, and poor PO intake. +urinary urgency with incomplete emptying. States she has no appetite and is continuously nauseated and has not been drinking enough water. She denies any fevers, chills, chest pain, sob.     Found to have creatine outpatient of 3.8. Current 2.87. WBC of 12.75.     1-Abdominal pain possibly seconary to passed stone vs gastric etiology- ulcer or mass?  -patient will likley need EGD, either in this admission or in near future  -Dr. Godoy to consult  -CT outpatient was unremarkable/ US in ER was unremarkable  - 2 view abdominal xray with oral contrast normal    2-ARF  -improved already since outpatient blood work  -c/w IV fluids ns@100cc/hr for the next 24 hours  -note: patient has been taking oral ibuprophen, aleve at home daily for pain    3-HTN:  -hold ace arb  -c/w lopressor    4-DVT prophy- sc heparin    5-mild hyponatremia- patience secondary to mild dehydration

## 2019-01-24 NOTE — PROGRESS NOTE ADULT - SUBJECTIVE AND OBJECTIVE BOX
Patient is a 74y old  Female who presents with a chief complaint of abdominal pain and RACHEL (24 Jan 2019 08:16)      HPI:  Patient is a 75 y/o female with PMHx of HTN p/w abdominal pains and outpatient  labs inidcating worsening renal function. Patient had abdominal pains starting few days prior to admission, went to see Dr. Kevin. Patient states initally these syptoms started 12/25, and since then intermittent vague abdominal pains. Sometimes, ruq, and sometimes luq. She was sent for outpatient CT abdominal which showed no acute findings.  Pt with decreasing oral intake over this time with some nausea and but no vomiting.  However her blood work showed elevated renal function, and patient was adivsed to come to ER for further workup.   This AM , pt still is feeling better with decreased pain.  Has a little more appetite.    PAST MEDICAL & SURGICAL HISTORY:  HTN (hypertension)  No significant past surgical history    MEDICATIONS  (STANDING):  heparin  Injectable 5000 Unit(s) SubCutaneous every 12 hours  metoprolol succinate  milliGRAM(s) Oral daily  pantoprazole    Tablet 40 milliGRAM(s) Oral two times a day  sodium chloride 0.9%. 1000 milliLiter(s) (100 mL/Hr) IV Continuous <Continuous>  sucralfate suspension 1 Gram(s) Oral four times a day    MEDICATIONS  (PRN):  acetaminophen   Tablet .. 650 milliGRAM(s) Oral every 6 hours PRN Temp greater or equal to 38C (100.4F), Mild Pain (1 - 3)  cyclobenzaprine 5 milliGRAM(s) Oral three times a day PRN Muscle Spasm  morphine  - Injectable 2 milliGRAM(s) IV Push every 4 hours PRN Severe Pain (7 - 10)  ondansetron Injectable 4 milliGRAM(s) IV Push every 6 hours PRN Nausea    Allergies  codeine (Hives)    REVIEW OF SYSTEMS:    CONSTITUTIONAL: No weakness, fevers or chills  RESPIRATORY: No cough, wheezing, hemoptysis; No shortness of breath  CARDIOVASCULAR: No chest pain or palpitations  GASTROINTESTINAL: as above  All other review of systems is negative unless indicated above.    Vital Signs Last 24 Hrs  T(C): 36.8 (24 Jan 2019 05:29), Max: 37.1 (24 Jan 2019 00:00)  T(F): 98.3 (24 Jan 2019 05:29), Max: 98.7 (24 Jan 2019 00:00)  HR: 73 (24 Jan 2019 05:29) (73 - 108)  BP: 112/50 (24 Jan 2019 05:29) (112/50 - 150/84)  BP(mean): --  RR: 18 (24 Jan 2019 05:29) (16 - 18)  SpO2: 97% (24 Jan 2019 05:29) (97% - 100%)    PHYSICAL EXAM:    Constitutional: NAD, well-developed  Respiratory: CTAB  Cardiovascular: S1 and S2, RRR, no M/G/R  Gastrointestinal: BS+, soft, mildly tender ruq/epigastrium  Extremities: No peripheral edema  Psychiatric: Normal mood, normal affect  Skin: No rashes    LABS:                        8.5    10.19 )-----------( 218      ( 24 Jan 2019 07:49 )             26.0     01-23    132<L>  |  100  |  61<H>  ----------------------------<  120<H>  3.9   |  19<L>  |  2.87<H>    Ca    9.1      23 Jan 2019 10:30  Phos  4.6     01-23  Mg     1.6     01-23    TPro  7.7  /  Alb  3.1<L>  /  TBili  0.8  /  DBili  x   /  AST  42<H>  /  ALT  35  /  AlkPhos  105  01-23    PT/INR - ( 23 Jan 2019 10:30 )   PT: 12.6 sec;   INR: 1.13 ratio           LIVER FUNCTIONS - ( 23 Jan 2019 10:30 )  Alb: 3.1 g/dL / Pro: 7.7 gm/dL / ALK PHOS: 105 U/L / ALT: 35 U/L / AST: 42 U/L / GGT: x             RADIOLOGY & ADDITIONAL STUDIES:

## 2019-01-25 ENCOUNTER — RESULT REVIEW (OUTPATIENT)
Age: 74
End: 2019-01-25

## 2019-01-25 ENCOUNTER — TRANSCRIPTION ENCOUNTER (OUTPATIENT)
Age: 74
End: 2019-01-25

## 2019-01-25 VITALS
TEMPERATURE: 98 F | OXYGEN SATURATION: 99 % | SYSTOLIC BLOOD PRESSURE: 117 MMHG | DIASTOLIC BLOOD PRESSURE: 53 MMHG | RESPIRATION RATE: 18 BRPM | HEART RATE: 74 BPM

## 2019-01-25 LAB
ANION GAP SERPL CALC-SCNC: 9 MMOL/L — SIGNIFICANT CHANGE UP (ref 5–17)
BUN SERPL-MCNC: 27 MG/DL — HIGH (ref 7–23)
CALCIUM SERPL-MCNC: 8.8 MG/DL — SIGNIFICANT CHANGE UP (ref 8.5–10.1)
CHLORIDE SERPL-SCNC: 112 MMOL/L — HIGH (ref 96–108)
CO2 SERPL-SCNC: 19 MMOL/L — LOW (ref 22–31)
CREAT SERPL-MCNC: 1.54 MG/DL — HIGH (ref 0.5–1.3)
GLUCOSE SERPL-MCNC: 102 MG/DL — HIGH (ref 70–99)
POTASSIUM SERPL-MCNC: 3.8 MMOL/L — SIGNIFICANT CHANGE UP (ref 3.5–5.3)
POTASSIUM SERPL-SCNC: 3.8 MMOL/L — SIGNIFICANT CHANGE UP (ref 3.5–5.3)
SODIUM SERPL-SCNC: 140 MMOL/L — SIGNIFICANT CHANGE UP (ref 135–145)

## 2019-01-25 PROCEDURE — 88312 SPECIAL STAINS GROUP 1: CPT | Mod: 26

## 2019-01-25 PROCEDURE — 88305 TISSUE EXAM BY PATHOLOGIST: CPT | Mod: 26

## 2019-01-25 RX ORDER — OLMESARTAN MEDOXOMIL-HYDROCHLOROTHIAZIDE 25; 40 MG/1; MG/1
0 TABLET, FILM COATED ORAL
Qty: 0 | Refills: 0 | COMMUNITY

## 2019-01-25 RX ORDER — LANOLIN ALCOHOL/MO/W.PET/CERES
3 CREAM (GRAM) TOPICAL ONCE
Qty: 0 | Refills: 0 | Status: DISCONTINUED | OUTPATIENT
Start: 2019-01-25 | End: 2019-01-25

## 2019-01-25 RX ORDER — PANTOPRAZOLE SODIUM 20 MG/1
1 TABLET, DELAYED RELEASE ORAL
Qty: 60 | Refills: 2
Start: 2019-01-25 | End: 2019-04-24

## 2019-01-25 RX ADMIN — PANTOPRAZOLE SODIUM 40 MILLIGRAM(S): 20 TABLET, DELAYED RELEASE ORAL at 06:19

## 2019-01-25 RX ADMIN — Medication 100 MILLIGRAM(S): at 06:19

## 2019-01-25 RX ADMIN — PANTOPRAZOLE SODIUM 40 MILLIGRAM(S): 20 TABLET, DELAYED RELEASE ORAL at 17:45

## 2019-01-25 NOTE — DISCHARGE NOTE ADULT - MEDICATION SUMMARY - MEDICATIONS TO TAKE
I will START or STAY ON the medications listed below when I get home from the hospital:    Tylenol 325 mg oral capsule  -- 1 cap(s) by mouth every 8 hours, As Needed -for moderate pain   -- Indication: For pain    simvastatin  -- Indication: For hld    metoprolol succinate 100 mg oral tablet, extended release  -- Indication: For htn    cyclobenzaprine 5 mg oral tablet  -- 1 tab(s) by mouth every 8 hours, As Needed -for muscle spasm   -- Some non-prescription drugs may aggravate your condition.  Read all labels carefully.  If a warning appears, check with your doctor before taking.  This drug may impair the ability to drive or operate machinery.  Use care until you become familiar with its effects.    -- Indication: For pain/ muscle relaxant    Protonix 40 mg oral delayed release tablet  -- 1 tab(s) by mouth 2 times a day   -- It is very important that you take or use this exactly as directed.  Do not skip doses or discontinue unless directed by your doctor.  Obtain medical advice before taking any non-prescription drugs as some may affect the action of this medication.  Swallow whole.  Do not crush.    -- Indication: For gerd

## 2019-01-25 NOTE — PROGRESS NOTE ADULT - REASON FOR ADMISSION
abdominal pain and RACHEL

## 2019-01-25 NOTE — PROGRESS NOTE ADULT - SUBJECTIVE AND OBJECTIVE BOX
CHIEF COMPLAINT/Diagnosis: abdominal pain, nausea    SUBJECTIVE: no complaints; feel a bit better    REVIEW OF SYSTEMS:    CONSTITUTIONAL: No weakness, fevers or chills  EYES/ENT: No visual changes;  No vertigo or throat pain   NECK: No pain or stiffness  RESPIRATORY: No cough, wheezing, hemoptysis; No shortness of breath  CARDIOVASCULAR: No chest pain or palpitations  GASTROINTESTINAL: No abdominal or epigastric pain. No nausea, vomiting, or hematemesis; No diarrhea or constipation. No melena or hematochezia.  GENITOURINARY: No dysuria, frequency or hematuria  NEUROLOGICAL: No numbness or weakness  SKIN: No itching, burning, rashes, or lesions   All other review of systems is negative unless indicated above    Vital Signs Last 24 Hrs  T(C): 36.7 (25 Jan 2019 12:57), Max: 37.1 (24 Jan 2019 17:15)  T(F): 98 (25 Jan 2019 12:57), Max: 98.7 (24 Jan 2019 17:15)  HR: 74 (25 Jan 2019 12:57) (74 - 82)  BP: 117/53 (25 Jan 2019 12:57) (117/53 - 126/58)  BP(mean): --  RR: 18 (25 Jan 2019 12:57) (18 - 18)  SpO2: 99% (25 Jan 2019 12:57) (98% - 99%)    I&O's Summary    24 Jan 2019 07:01  -  25 Jan 2019 07:00  --------------------------------------------------------  IN: 240 mL / OUT: 0 mL / NET: 240 mL        CAPILLARY BLOOD GLUCOSE          PHYSICAL EXAM:    Constitutional: NAD, awake and alert, well-developed  HEENT: PERR, EOMI, Normal Hearing, MMM  Neck: Soft and supple, No LAD, No JVD  Respiratory: Breath sounds are clear bilaterally, No wheezing, rales or rhonchi  Cardiovascular: S1 and S2, regular rate and rhythm, no Murmurs, gallops or rubs  Gastrointestinal: Bowel Sounds present, soft, nontender, nondistended, no guarding, no rebound  Extremities: No peripheral edema  Vascular: 2+ peripheral pulses  Neurological: A/O x 3, no focal deficits  Musculoskeletal: 5/5 strength b/l upper and lower extremities  Skin: No rashes    MEDICATIONS:  MEDICATIONS  (STANDING):  heparin  Injectable 5000 Unit(s) SubCutaneous every 12 hours  metoprolol succinate  milliGRAM(s) Oral daily  pantoprazole    Tablet 40 milliGRAM(s) Oral two times a day  sodium chloride 0.9%. 1000 milliLiter(s) (80 mL/Hr) IV Continuous <Continuous>      LABS: All Labs Reviewed:                        8.5    10.19 )-----------( 218      ( 24 Jan 2019 07:49 )             26.0     01-25    140  |  112<H>  |  27<H>  ----------------------------<  102<H>  3.8   |  19<L>  |  1.54<H>    Ca    8.8      25 Jan 2019 11:11        Assessment and Plan:       Patient is a 73 y/o female with PMHx of HTN p/w abdominal pains and outpatient  labs inidcating rising renal function. Patient had abdominal pains starting 3 days ago, went to see Dr. Kevin 2 days ago for abdominal pains. Patient states initally these syptoms started 12/25, and since then intermittent vague abdominal pains. Sometimes, ruq, and sometimes luq. She was sent for outpatient CT abdominal which showed no acute findings. However her blood work showed elevated renal function, and patient was adivsed to come to ER for further workup. Pt also reports intermittent NVD, and poor PO intake. +urinary urgency with incomplete emptying. States she has no appetite and is continuously nauseated and has not been drinking enough water. She denies any fevers, chills, chest pain, sob.     Found to have creatine outpatient of 3.8. Current 2.87. WBC of 12.75.     1-Abdominal pain possibly seconary to passed stone vs gastric etiology- ulcer or mass?  -patient will likely need EGD, either in this admission or in near future  -Dr. Godoy to consult  -CT outpatient was unremarkable/ US in ER was unremarkable  - 2 view abdominal xray with oral contrast normal  -EGD today    2-ARF  -improved already since outpatient blood work  -c/w IV fluids ns@80cchr  -note: patient has been taking oral ibuprophen, aleve at home daily for pain    3-HTN:  -hold ace arb  -c/w lopressor    4-DVT prophy- sc heparin    5-mild hyponatremia- patience secondary to mild dehydration

## 2019-01-25 NOTE — DISCHARGE NOTE ADULT - HOSPITAL COURSE
Vital Signs Last 24 Hrs  T(C): 36.7 (25 Jan 2019 12:57), Max: 37.1 (24 Jan 2019 17:15)  T(F): 98 (25 Jan 2019 12:57), Max: 98.7 (24 Jan 2019 17:15)  HR: 74 (25 Jan 2019 12:57) (74 - 82)  BP: 117/53 (25 Jan 2019 12:57) (117/53 - 126/58)  BP(mean): --  RR: 18 (25 Jan 2019 12:57) (18 - 18)  SpO2: 99% (25 Jan 2019 12:57) (98% - 99%)    HEENT:   pupils equal and reactive, EOMI, no oropharyngeal lesions, erythema, exudates, oral thrush    NECK:   supple, no carotid bruits, no palpable lymph nodes, no thyromegaly    CV:  +S1, +S2, regular, no murmurs or rubs    RESP:   lungs clear to auscultation bilaterally, no wheezing, rales, rhonchi, good air entry bilaterally    BREAST:  not examined    GI:  abdomen soft, non-tender, non-distended, normal BS, no bruits, no abdominal masses, no palpable masses    RECTAL:  not examined    :  not examined    MSK:   normal muscle tone, no atrophy, no rigidity, no contractions    EXT:   no clubbing, no cyanosis, no edema, no calf pain, swelling or erythema    VASCULAR:  pulses equal and symmetric in the upper and lower extremities    NEURO:  AAOX3, no focal neurological deficits, follows all commands, able to move extremities spontaneously    SKIN:  no ulcers, lesions or rashes    25 Jan 2019 11:11    140    |  112    |  27     ----------------------------<  102    3.8     |  19     |  1.54     Ca    8.8        25 Jan 2019 11:11    CBC Full  -  ( 24 Jan 2019 07:49 )  WBC Count : 10.19 K/uL  Hemoglobin : 8.5 g/dL  Hematocrit : 26.0 %  Platelet Count - Automated : 218 K/uL  Mean Cell Volume : 93.2 fl  Mean Cell Hemoglobin : 30.5 pg  Mean Cell Hemoglobin Concentration : 32.7 gm/dL        Hospital Course:      Patient is a 75 y/o female with PMHx of HTN p/w abdominal pains and outpatient  labs inidcating rising renal function. Patient had abdominal pains starting 3 days ago, went to see Dr. Kevin 2 days ago for abdominal pains. Patient states initally these syptoms started 12/25, and since then intermittent vague abdominal pains. Sometimes, ruq, and sometimes luq.   Admitted for evaluation of Abdominal pain. Patient had a u/s while inpatient and recent CT scan abd/pelvis with no explanation of her gi symptoms. She continued to feel nauseated with vague abdominal complaints. GI was consulted and patient was taken for EGD.  Multiple duodenal and gastric ulcers found, non bleeding, biopsied. Patient is now cleared for discahrge with oral Protonix BID. She states she has been feeling better sinice on the protonix. Patient will have contineud f/u outpateitn with  Dr. Godoy/ Disha.     EGD today    Other issues:  1-ARF, likley secondary to poor oral intake and dehydration.   improved after inpatietn hydration.  -recco to hold Benicar [facility] for repeat bloood work

## 2019-01-25 NOTE — PROGRESS NOTE ADULT - SUBJECTIVE AND OBJECTIVE BOX
Patient is a 74y old  Female who presents with a chief complaint of abdominal pain and RACHEL (24 Jan 2019 08:39)    HPI:  Patient is a 73 y/o female with PMHx of HTN p/w abdominal pains and outpatient  labs inidcating rising renal function. Patient had abdominal pains starting 3 days ago, went to see Dr. Kevin 2 days ago for abdominal pains. Patient states initally these syptoms started 12/25, and since then intermittent vague abdominal pains. Sometimes, ruq, and sometimes luq. She was sent for outpatient CT abdominal which showed no acute findings. However her blood work showed elevated renal function, and patient was adivsed to come to ER for further workup. Pt also reports intermittent NVD, and poor PO intake. +urinary urgency with incomplete emptying. States she has no appetite and is continuously nauseated and has not been drinking enough water. She denies any fevers, chills, chest pain, sob. (23 Jan 2019 13:30)    1/25/2019-  Pt reports improved epigastric pain.   No signs of gi bleeding.   NPO, awaiting EGD today.     PAST MEDICAL & SURGICAL HISTORY:  HTN (hypertension)  No significant past surgical history    MEDICATIONS  (STANDING):  heparin  Injectable 5000 Unit(s) SubCutaneous every 12 hours  metoprolol succinate  milliGRAM(s) Oral daily  pantoprazole    Tablet 40 milliGRAM(s) Oral two times a day  sodium chloride 0.9%. 1000 milliLiter(s) (80 mL/Hr) IV Continuous <Continuous>    MEDICATIONS  (PRN):  acetaminophen   Tablet .. 650 milliGRAM(s) Oral every 6 hours PRN Temp greater or equal to 38C (100.4F), Mild Pain (1 - 3)  cyclobenzaprine 5 milliGRAM(s) Oral three times a day PRN Muscle Spasm  morphine  - Injectable 2 milliGRAM(s) IV Push every 4 hours PRN Severe Pain (7 - 10)  ondansetron Injectable 4 milliGRAM(s) IV Push every 6 hours PRN Nausea    Allergies  codeine (Hives)    FAMILY HISTORY:  No pertinent family history in first degree relatives    REVIEW OF SYSTEMS:  CONSTITUTIONAL: No weakness, fevers or chills  RESPIRATORY: No cough, wheezing, hemoptysis; No shortness of breath  CARDIOVASCULAR: No chest pain or palpitations  GASTROINTESTINAL: Per HPI.   GENITOURINARY: No dysuria, frequency or hematuria  NEUROLOGICAL: No numbness or weakness  SKIN: No itching, burning, rashes, or lesions   PSYCH: Normal mood and affect  All other review of systems is negative unless indicated above.    Vital Signs Last 24 Hrs  T(C): 36.8 (25 Jan 2019 05:26), Max: 37.1 (24 Jan 2019 17:15)  T(F): 98.3 (25 Jan 2019 05:26), Max: 98.7 (24 Jan 2019 17:15)  HR: 82 (25 Jan 2019 05:26) (78 - 82)  BP: 126/58 (25 Jan 2019 05:26) (123/56 - 128/54)  BP(mean): --  RR: 18 (25 Jan 2019 05:26) (18 - 18)  SpO2: 98% (25 Jan 2019 05:26) (98% - 99%)    PHYSICAL EXAM:  Constitutional: NAD, well-developed  Respiratory: CTAB  Cardiovascular: S1 and S2, RRR, no M/G/R  Gastrointestinal: BS+, soft, NT/ND  Extremities: No peripheral edema  Vascular: 2+ peripheral pulses  Neurological: A/O x 3, no focal deficits  Psychiatric: Normal mood, normal affect  Skin: No rashes    LABS:                        8.5    10.19 )-----------( 218      ( 24 Jan 2019 07:49 )             26.0     01-24    135  |  105  |  42<H>  ----------------------------<  103<H>  3.7   |  21<L>  |  1.96<H>    Ca    8.8      24 Jan 2019 07:49  Phos  4.6     01-23  Mg     1.6     01-23    TPro  7.7  /  Alb  3.1<L>  /  TBili  0.8  /  DBili  x   /  AST  42<H>  /  ALT  35  /  AlkPhos  105  01-23    PT/INR - ( 23 Jan 2019 10:30 )   PT: 12.6 sec;   INR: 1.13 ratio           LIVER FUNCTIONS - ( 23 Jan 2019 10:30 )  Alb: 3.1 g/dL / Pro: 7.7 gm/dL / ALK PHOS: 105 U/L / ALT: 35 U/L / AST: 42 U/L / GGT: x             RADIOLOGY & ADDITIONAL STUDIES:

## 2019-01-25 NOTE — DISCHARGE NOTE ADULT - PATIENT PORTAL LINK FT
You can access the [facility] Patient Portal, offered by [facility], by registering with the following website: [email or web address]

## 2019-01-25 NOTE — DISCHARGE NOTE ADULT - CARE PLAN
Principal Discharge DX:	Peptic ulcer of stomach, unspecified chronicity  Goal:	start Protonix 40mg twice a day day; follow up outpatient with Dr. Gauthier or Dr. Godoy in one week  Assessment and plan of treatment:	same as above  Secondary Diagnosis:	ARF (acute renal failure)  Goal:	Hold Benicar until outpatient repeat blood work and follow up with primary care doctor  Assessment and plan of treatment:	continue with aggressive oral hydration

## 2019-01-25 NOTE — DISCHARGE NOTE ADULT - PLAN OF CARE
start Protonix 40mg twice a day day; follow up outpatient with Dr. Gauthier or Dr. Godoy in one week same as above Hold Benicar until outpatient repeat blood work and follow up with primary care doctor continue with aggressive oral hydration

## 2019-01-25 NOTE — PROGRESS NOTE ADULT - ASSESSMENT
73 y/o female with abdominal pain, nausea and decreased oral intake with renal insufficiency likely due to dehydration.  clinically improving.  r/o possible gastritis vs ulcer?  carafate and ppi.  continue to keep npo, plan for egd today with Dr. Orlando around 4:30pm.  discussed with pt, , Dr. Orlando.

## 2019-01-25 NOTE — DISCHARGE NOTE ADULT - MEDICATION SUMMARY - MEDICATIONS TO STOP TAKING
I will STOP taking the medications listed below when I get home from the hospital:    olmesartan-hydrochlorothiazide 40 mg-25 mg oral tablet

## 2019-01-25 NOTE — DISCHARGE NOTE ADULT - OTHER SIGNIFICANT FINDINGS
EGD report : Multiple dispersed, non-bleeding erosions were found in the gastric     FINDING      antrum. There were no stigmata of recent bleeding. Biopsies were taken     FINDING      with a cold forceps for histology. Estimated blood loss was minimal.    FINDING      Three non-bleeding cratered duodenal ulcers with no stigmata of bleeding     FINDING      were found in the duodenal bulb and in the second portion of the     FINDING      duodenum. The largest lesion was 20 mm in largest dimension. Biopsies     FINDING      were taken with a cold forceps for histology. Estimated blood loss was     FINDING      minimal.    COMPLIC Complications:       No immediate complications.    IMPRESS Impression:          - Non-bleeding erosive gastropathy. Biopsied.    IMPRESS                      - Multiple non-bleeding duodenal ulcers with no stigmata     IMPRESS                      of bleeding. Biopsied.

## 2019-01-26 LAB
-  AMIKACIN: SIGNIFICANT CHANGE UP
-  AMPICILLIN/SULBACTAM: SIGNIFICANT CHANGE UP
-  AMPICILLIN: SIGNIFICANT CHANGE UP
-  AZTREONAM: SIGNIFICANT CHANGE UP
-  CEFEPIME: SIGNIFICANT CHANGE UP
-  CEFOXITIN: SIGNIFICANT CHANGE UP
-  CEFTRIAXONE: SIGNIFICANT CHANGE UP
-  CIPROFLOXACIN: SIGNIFICANT CHANGE UP
-  ERTAPENEM: SIGNIFICANT CHANGE UP
-  GENTAMICIN: SIGNIFICANT CHANGE UP
-  IMIPENEM: SIGNIFICANT CHANGE UP
-  LEVOFLOXACIN: SIGNIFICANT CHANGE UP
-  MEROPENEM: SIGNIFICANT CHANGE UP
-  NITROFURANTOIN: SIGNIFICANT CHANGE UP
-  PIPERACILLIN/TAZOBACTAM: SIGNIFICANT CHANGE UP
-  TIGECYCLINE: SIGNIFICANT CHANGE UP
-  TOBRAMYCIN: SIGNIFICANT CHANGE UP
-  TRIMETHOPRIM/SULFAMETHOXAZOLE: SIGNIFICANT CHANGE UP
CULTURE RESULTS: SIGNIFICANT CHANGE UP
METHOD TYPE: SIGNIFICANT CHANGE UP
ORGANISM # SPEC MICROSCOPIC CNT: SIGNIFICANT CHANGE UP
ORGANISM # SPEC MICROSCOPIC CNT: SIGNIFICANT CHANGE UP
SPECIMEN SOURCE: SIGNIFICANT CHANGE UP

## 2019-01-29 LAB — SURGICAL PATHOLOGY FINAL REPORT - CH: SIGNIFICANT CHANGE UP

## 2019-01-30 DIAGNOSIS — E87.1 HYPO-OSMOLALITY AND HYPONATREMIA: ICD-10-CM

## 2019-01-30 DIAGNOSIS — N17.9 ACUTE KIDNEY FAILURE, UNSPECIFIED: ICD-10-CM

## 2019-01-30 DIAGNOSIS — I10 ESSENTIAL (PRIMARY) HYPERTENSION: ICD-10-CM

## 2019-01-30 DIAGNOSIS — K25.9 GASTRIC ULCER, UNSPECIFIED AS ACUTE OR CHRONIC, WITHOUT HEMORRHAGE OR PERFORATION: ICD-10-CM

## 2019-01-30 DIAGNOSIS — R10.9 UNSPECIFIED ABDOMINAL PAIN: ICD-10-CM

## 2019-01-30 DIAGNOSIS — E86.0 DEHYDRATION: ICD-10-CM

## 2019-05-20 ENCOUNTER — APPOINTMENT (OUTPATIENT)
Dept: RHEUMATOLOGY | Facility: CLINIC | Age: 74
End: 2019-05-20
Payer: MEDICARE

## 2019-05-20 VITALS
BODY MASS INDEX: 23 KG/M2 | SYSTOLIC BLOOD PRESSURE: 128 MMHG | OXYGEN SATURATION: 96 % | HEIGHT: 62 IN | WEIGHT: 125 LBS | DIASTOLIC BLOOD PRESSURE: 58 MMHG | HEART RATE: 64 BPM

## 2019-05-20 DIAGNOSIS — I10 ESSENTIAL (PRIMARY) HYPERTENSION: ICD-10-CM

## 2019-05-20 DIAGNOSIS — Z87.891 PERSONAL HISTORY OF NICOTINE DEPENDENCE: ICD-10-CM

## 2019-05-20 DIAGNOSIS — N17.9 ACUTE KIDNEY FAILURE, UNSPECIFIED: ICD-10-CM

## 2019-05-20 DIAGNOSIS — Z87.42 PERSONAL HISTORY OF OTHER DISEASES OF THE FEMALE GENITAL TRACT: ICD-10-CM

## 2019-05-20 DIAGNOSIS — Z02.82 ENCOUNTER FOR ADOPTION SERVICES: ICD-10-CM

## 2019-05-20 DIAGNOSIS — E78.5 HYPERLIPIDEMIA, UNSPECIFIED: ICD-10-CM

## 2019-05-20 PROCEDURE — 99203 OFFICE O/P NEW LOW 30 MIN: CPT

## 2019-05-20 RX ORDER — OLMESARTAN MEDOXOMIL 20 MG/1
20 TABLET, FILM COATED ORAL
Refills: 0 | Status: ACTIVE | COMMUNITY

## 2019-05-20 RX ORDER — CHOLECALCIFEROL (VITAMIN D3) 25 MCG
TABLET ORAL
Refills: 0 | Status: ACTIVE | COMMUNITY

## 2019-05-20 RX ORDER — ROSUVASTATIN CALCIUM 20 MG/1
20 TABLET, FILM COATED ORAL
Refills: 0 | Status: ACTIVE | COMMUNITY

## 2019-05-20 RX ORDER — METOPROLOL TARTRATE 25 MG/1
25 TABLET, FILM COATED ORAL
Refills: 0 | Status: ACTIVE | COMMUNITY

## 2019-05-20 NOTE — PHYSICAL EXAM
[General Appearance - Alert] : alert [Sclera] : the sclera and conjunctiva were normal [General Appearance - In No Acute Distress] : in no acute distress [Extraocular Movements] : extraocular movements were intact [PERRL With Normal Accommodation] : pupils were equal in size, round, and reactive to light [Outer Ear] : the ears and nose were normal in appearance [Oropharynx] : the oropharynx was normal [Neck Appearance] : the appearance of the neck was normal [Auscultation Breath Sounds / Voice Sounds] : lungs were clear to auscultation bilaterally [Heart Rate And Rhythm] : heart rate was normal and rhythm regular [Heart Sounds] : normal S1 and S2 [Heart Sounds Gallop] : no gallops [Heart Sounds Pericardial Friction Rub] : no pericardial rub [Murmurs] : no murmurs [Abdomen Soft] : soft [Bowel Sounds] : normal bowel sounds [Abdomen Tenderness] : non-tender [Cervical Lymph Nodes Enlarged Posterior Bilaterally] : posterior cervical [Supraclavicular Lymph Nodes Enlarged Bilaterally] : supraclavicular [Cervical Lymph Nodes Enlarged Anterior Bilaterally] : anterior cervical [FreeTextEntry1] : + mild TTP over thoracic spine [No CVA Tenderness] : no ~M costovertebral angle tenderness [Nail Clubbing] : no clubbing  or cyanosis of the fingernails [Abnormal Walk] : normal gait [Musculoskeletal - Swelling] : no joint swelling seen [Skin Color & Pigmentation] : normal skin color and pigmentation [Motor Tone] : muscle strength and tone were normal [] : no rash [No Focal Deficits] : no focal deficits [Impaired Insight] : insight and judgment were intact [Oriented To Time, Place, And Person] : oriented to person, place, and time [Affect] : the affect was normal

## 2019-05-20 NOTE — HISTORY OF PRESENT ILLNESS
[FreeTextEntry1] : KATIE GONZALEZ is a 74 year old woman who presents for hx of osteopenia and recent spinal compression fractures. \par \par Reports dx with Osteopenia 20 years ago and completed 5 years of Actonel and has since been on drug holiday, taking [id number]  and Vit D 4K IU supplementation daily. Tolerated bisphosphonate without SE. \par \par T12 spontaneous compression fx 7/2018 with vertebroplasty 8/2018 without significant improvement in pain. T7/T9 spontaneous fx in 1/2019. Denies hip or femur fractures, + former tobacco. Denies hx of steroid use, RA/inflammatory arthritis. Pt is adopted. \par \par Currently with pain on proglonged standing and unable to walk long distances 2/2 pain in back. Denies LE weakness, paresthesias, saddle anesthesia, incontinence. No F/C, recent infections. \par \par Jan 2019 admission for decreased PO intake, RACHEL 2/2 CT PO contrast? and found with duodenal ulcers, now reportedly healed on repeat EGD. No current GI symptoms.

## 2019-06-17 ENCOUNTER — APPOINTMENT (OUTPATIENT)
Dept: RHEUMATOLOGY | Facility: CLINIC | Age: 74
End: 2019-06-17
Payer: MEDICARE

## 2019-06-17 VITALS
HEIGHT: 62 IN | DIASTOLIC BLOOD PRESSURE: 54 MMHG | WEIGHT: 125 LBS | SYSTOLIC BLOOD PRESSURE: 116 MMHG | BODY MASS INDEX: 23 KG/M2 | HEART RATE: 60 BPM | OXYGEN SATURATION: 96 %

## 2019-06-17 PROCEDURE — 99212 OFFICE O/P EST SF 10 MIN: CPT

## 2019-06-17 NOTE — REVIEW OF SYSTEMS
[As Noted in HPI] : as noted in HPI [Negative] : Heme/Lymph [FreeTextEntry9] : back pain limiting ambulation

## 2019-06-17 NOTE — ASSESSMENT
[FreeTextEntry1] : KATIE GONZALEZ is a 74 year old woman with long-standing osteopenia, who completed 5 years of Actonel in the past, off bisphosphonates x approx 15 years. Recurrent spontaneous spinal compression fractures x 3 since 2018, s/p vertebroplasty for one but still with spinal pain and limitation to mobility including prolonged standing and walking limiting functionality and increasing future fall risk. Hx of RCAHEL s/p IV contrast in Jan and Cr 1.6 on recent labs with GFR 32. Despite DEXA report of osteopenia with over all fracture risk 18.2, and hip fracture 3.6, clinically this patient must be considered in the osteoporotic range given her multiple, spontaneous vertebral fractures without any other risk factors such as inflammatory arthritis, etc that could be modified at this time. Concern for fall risk given pain that limits ADLs and walking. She is not a candidate for Reclast given her renal function since January which has not improved, and after extensive discussion about Forteo she declined as she does not feel comfortable with self-injection and does not have anyone else who would be able to administer this daily for her. Thus at this time, I feel that the best choice to maintain her bone density and avoid further fragility fractures would be Prolia. \par \par - will repeat CMP today, Ca, Vit D in range on recent b/w\par - c/w Ca/Vit D supplementation\par - pt has seen dentist, no planned oral surgery, superficial cap to be placed next week\par - Risks and benefits of prolia use were d/w patient including but not limited to myalgias, flu like symptoms, atypical fractures, infections, avascular necrosis of the jaw and hypocalcemia. D/w patient that current guidelines recommend indefinite Prolia therapy, pt acknowledged understanding and prefers this treatment as does not feel she can administer Forteo safely herself. \par - Medication ordered thru specialty pharmacy, will call and schedule appointment for administration when medication received

## 2019-06-17 NOTE — PHYSICAL EXAM
[General Appearance - Alert] : alert [General Appearance - In No Acute Distress] : in no acute distress [General Appearance - Well Nourished] : well nourished [PERRL With Normal Accommodation] : pupils were equal in size, round, and reactive to light [Sclera] : the sclera and conjunctiva were normal [Extraocular Movements] : extraocular movements were intact [Outer Ear] : the ears and nose were normal in appearance [Oropharynx] : the oropharynx was normal [Heart Rate And Rhythm] : heart rate was normal and rhythm regular [Auscultation Breath Sounds / Voice Sounds] : lungs were clear to auscultation bilaterally [Heart Sounds] : normal S1 and S2 [Heart Sounds Gallop] : no gallops [Murmurs] : no murmurs [Heart Sounds Pericardial Friction Rub] : no pericardial rub [No CVA Tenderness] : no ~M costovertebral angle tenderness [Nail Clubbing] : no clubbing  or cyanosis of the fingernails [Musculoskeletal - Swelling] : no joint swelling seen [Motor Tone] : muscle strength and tone were normal [Skin Color & Pigmentation] : normal skin color and pigmentation [] : no rash [No Focal Deficits] : no focal deficits [Oriented To Time, Place, And Person] : oriented to person, place, and time [Impaired Insight] : insight and judgment were intact [Affect] : the affect was normal [FreeTextEntry1] : slow gait

## 2019-06-17 NOTE — HISTORY OF PRESENT ILLNESS
[FreeTextEntry1] : KATIE GONZALEZ is a 74 year old woman who presents for hx of osteopenia and recent spinal compression fractures in the past year despite recent DEXA in Jan 2019 with continued osteopenia. \par \par Reports dx with Osteopenia 20 years ago and completed 5 years of Actonel at that time and has since been on drug holiday, taking [id number]  and Vit D 4K IU supplementation daily and levels in normal range on recent b/w. Tolerated bisphosphonate without SE. \par \par T12 spontaneous compression fx 7/2018 with vertebroplasty 8/2018 without significant improvement in pain. T7/T9 spontaneous fx in 1/2019. Denies hip or femur fractures, + former tobacco. Denies hx of steroid use, RA/inflammatory arthritis. Pt is adopted. \par \par Currently with continued pain on prolonged standing and unable to walk long distances 2/2 pain in back. Denies LE weakness, paresthesias, saddle anesthesia, incontinence. No F/C, recent infections. \par \par Jan 2019 admission for decreased PO intake, RACHEL 2/2 CT PO contrast? and found with duodenal ulcers, now reportedly healed on repeat EGD. No current GI symptoms. Cr on recent labs 1.4 to 1.6, GFR 32.

## 2019-06-23 ENCOUNTER — EMERGENCY (EMERGENCY)
Facility: HOSPITAL | Age: 74
LOS: 0 days | Discharge: HOME | End: 2019-06-24
Attending: EMERGENCY MEDICINE | Admitting: EMERGENCY MEDICINE
Payer: MEDICARE

## 2019-06-23 VITALS
DIASTOLIC BLOOD PRESSURE: 69 MMHG | RESPIRATION RATE: 19 BRPM | TEMPERATURE: 99 F | OXYGEN SATURATION: 100 % | SYSTOLIC BLOOD PRESSURE: 138 MMHG | HEART RATE: 71 BPM

## 2019-06-23 VITALS — HEIGHT: 61 IN | WEIGHT: 119.93 LBS
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
## 2019-06-23 DIAGNOSIS — Z79.899 OTHER LONG TERM (CURRENT) DRUG THERAPY: ICD-10-CM
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
## 2019-06-23 DIAGNOSIS — S51.011A LACERATION WITHOUT FOREIGN BODY OF RIGHT ELBOW, INITIAL ENCOUNTER: ICD-10-CM

## 2019-06-23 DIAGNOSIS — Z88.5 ALLERGY STATUS TO NARCOTIC AGENT: ICD-10-CM

## 2019-06-23 PROCEDURE — 12005 RPR S/N/A/GEN/TRK12.6-20.0CM: CPT | Mod: RT

## 2019-06-23 PROCEDURE — 99283 EMERGENCY DEPT VISIT LOW MDM: CPT | Mod: 25

## 2019-06-23 RX ORDER — TETANUS TOXOID, REDUCED DIPHTHERIA TOXOID AND ACELLULAR PERTUSSIS VACCINE, ADSORBED 5; 2.5; 8; 8; 2.5 [IU]/.5ML; [IU]/.5ML; UG/.5ML; UG/.5ML; UG/.5ML
0.5 SUSPENSION INTRAMUSCULAR ONCE
Refills: 0 | Status: COMPLETED | OUTPATIENT
Start: 2019-06-23 | End: 2019-06-23

## 2019-06-23 RX ADMIN — TETANUS TOXOID, REDUCED DIPHTHERIA TOXOID AND ACELLULAR PERTUSSIS VACCINE, ADSORBED 0.5 MILLILITER(S): 5; 2.5; 8; 8; 2.5 SUSPENSION INTRAMUSCULAR at 23:03

## 2019-06-23 NOTE — ED ADULT NURSE NOTE - OBJECTIVE STATEMENT
pt presents to ED c/o ~ 2 in laceration to R arm elbow area. Bleeding controlled at site. subcutaneous fat visible. moist gauze placed over lac and arm elevated to heart level. TDAP given. pt given and oriented to call bell. family at bedside. pt AOx3 no active distress or other complaints. RN will ctm

## 2019-06-23 NOTE — ED ADULT NURSE NOTE - NSIMPLEMENTINTERV_GEN_ALL_ED
Implemented All Universal Safety Interventions:  [place] to call system. Call bell, personal items and telephone within reach. Instruct patient to call for assistance. Room bathroom lighting operational. Non-slip footwear when patient is off stretcher. Physically safe environment: no spills, clutter or unnecessary equipment. Stretcher in lowest position, wheels locked, appropriate side rails in place.

## 2019-06-24 PROCEDURE — 73080 X-RAY EXAM OF ELBOW: CPT | Mod: 26,RT

## 2019-06-24 NOTE — ED PROVIDER NOTE - ATTENDING CONTRIBUTION TO CARE
73 yo female, seen and examined with MASOOD Colbert.  Pt with 13 cm elbow laceration, n/v intact.  FROM to arm.  Sutures supervised by me.  Well tolerated.  Pt will f/u with PMD, return instructions discussed.

## 2019-06-24 NOTE — ED PROVIDER NOTE - OBJECTIVE STATEMENT
75 y/o F presents with laceration. Pt states she was walking up stairs outside and slipped falling onto her R forearm. Reports large laceration with small amount of bleeding. Not on blood thinners. Able to ambulate on her own after the fall. Denies loc, n/v, HA, visual changes, numbness or tingling, motor or sensory deficit, CP, SOB, or other complaints at this time. -Liam Colbert PA-C

## 2019-06-24 NOTE — ED PROVIDER NOTE - PHYSICAL EXAMINATION
**GEN - NAD; well appearing; A+O x3. *Head: NCAT  **PULMONARY - CTA b/l, symmetric breath sounds. ***CARDIAC -s1s2, RRR, no M,G,R  **ABDOMEN - ND, NT, soft, no guarding, no rebound, no ryan's  Ext: 13cm linear full thickness laceration into Subq fat to R forearm extending to the elbow. No tendon injury noted. FROM 5/5 muscle strength. NTTP to bony prominences. No snuffbox tenderness.   **NEUROLOGIC - alert and oriented, follows commands, sensation nl, motor nl, **PSYCH - insight and judgment nl, memory nl, affect nl, thought nl

## 2019-06-24 NOTE — ED PROCEDURE NOTE - CPROC ED POST PROC CARE GUIDE1
Instructed patient/caregiver to follow-up with primary care physician./Verbal/written post procedure instructions were given to patient/caregiver.
Instructed patient/caregiver regarding signs and symptoms of infection./Instructed patient/caregiver to follow-up with primary care physician./Verbal/written post procedure instructions were given to patient/caregiver.

## 2019-07-24 ENCOUNTER — RX CHANGE (OUTPATIENT)
Age: 74
End: 2019-07-24

## 2019-07-24 DIAGNOSIS — K21.9 GASTRO-ESOPHAGEAL REFLUX DISEASE W/OUT ESOPHAGITIS: ICD-10-CM

## 2019-07-29 ENCOUNTER — APPOINTMENT (OUTPATIENT)
Dept: RHEUMATOLOGY | Facility: CLINIC | Age: 74
End: 2019-07-29
Payer: MEDICARE

## 2019-07-29 VITALS
BODY MASS INDEX: 22.26 KG/M2 | HEART RATE: 70 BPM | DIASTOLIC BLOOD PRESSURE: 70 MMHG | OXYGEN SATURATION: 96 % | HEIGHT: 62 IN | WEIGHT: 121 LBS | SYSTOLIC BLOOD PRESSURE: 130 MMHG

## 2019-07-29 PROCEDURE — 96401 CHEMO ANTI-NEOPL SQ/IM: CPT

## 2019-07-29 PROCEDURE — 99213 OFFICE O/P EST LOW 20 MIN: CPT | Mod: 25

## 2019-07-29 RX ORDER — DENOSUMAB 60 MG/ML
60 INJECTION SUBCUTANEOUS
Qty: 1 | Refills: 0 | Status: COMPLETED | OUTPATIENT
Start: 2019-07-29

## 2019-07-29 RX ADMIN — DENOSUMAB 0 MG/ML: 60 INJECTION SUBCUTANEOUS at 00:00

## 2019-07-29 NOTE — HISTORY OF PRESENT ILLNESS
[FreeTextEntry1] : KATIE GONZALEZ is a 74 year old woman who presents for hx of osteopenia and recent spinal compression fractures in the past year despite recent DEXA in Jan 2019 with continued osteopenia. \par \par Reports dx with Osteopenia 20 years ago and completed 5 years of Actonel at that time and has since been on drug holiday, taking [id number]  and Vit D 4K IU supplementation daily and levels in normal range on recent b/w. Tolerated bisphosphonate without SE. \par \par T12 spontaneous compression fx 7/2018 with vertebroplasty 8/2018 without significant improvement in pain. T7/T9 spontaneous fx in 1/2019. Denies hip or femur fractures, + former tobacco. Denies hx of steroid use, RA/inflammatory arthritis. Pt is adopted. \par \par Currently with continued pain on prolonged standing and unable to walk long distances 2/2 pain in back. Denies LE weakness, paresthesias, saddle anesthesia, incontinence. No F/C, recent infections. \par \par Jan 2019 admission for decreased PO intake, RACHEL 2/2 CT PO contrast? and found with duodenal ulcers, now reportedly healed on repeat EGD. No current GI symptoms. Cr on recent labs 1.4 to 1.6, GFR 32. \par \par Today here for first Prolia injection as is considered clinically osteoporotic given multiple compression fx. No F/C, recent infections, CP, SOB. All questions about medication answered. No recent trips, falls, new or worsening back pain.

## 2019-07-29 NOTE — PROCEDURE
[Today's Date:] : Date: [unfilled] [Patient] : the patient [Risks] : risks [Benefits] : benefits [Consent Obtained] : written consent was obtained prior to the procedure and is detailed in the patient's record [Alternatives] : alternatives [Alcohol] : alcohol [Tolerated Well] : the patient tolerated the procedure well [No Complications] : there were no complications [Instructions Given] : handouts/patient instructions were given to patient [Patient Instructed to Call] : patient was instructed to call if redness at site, a decrease in range of motion or an increase in pain is noted after procedure. [FreeTextEntry1] : Pt feels well today, no recent infections, fevers/chills. Prolia 60mg administered subcutaneously into L upper arm after site cleansed with alcohol. Pt tolerated well, no leakage of medication from injection site, dressed with Band-Aid. \par \par Pt educated that site may be sore post-injection, she could experience mild malaise. Advised to call office if develops any pain, swelling, redness at site, new fevers or chills. \par \par Lot [id number], Exp 8/2021

## 2019-07-29 NOTE — ASSESSMENT
[FreeTextEntry1] : KATIE GONZALEZ is a 74 year old woman with long-standing osteopenia, who completed 5 years of Actonel in the past, off bisphosphonates x approx 15 years. Recurrent spontaneous spinal compression fractures x 3 since 2018, s/p vertebroplasty for one but still with spinal pain and limitation to mobility including prolonged standing and walking limiting functionality and increasing future fall risk. Hx of RACHEL s/p IV contrast in Jan and Cr 1.6 on recent labs with GFR 32. Despite DEXA report of osteopenia with over all fracture risk 18.2, and hip fracture 3.6, clinically this patient must be considered in the osteoporotic range given her multiple, spontaneous vertebral fractures without any other risk factors such as inflammatory arthritis, etc that could be modified at this time. Concern for fall risk given pain that limits ADLs and walking. She is not a candidate for Reclast given her renal function since January which has not improved, and after extensive discussion about Forteo she declined as she does not feel comfortable with self-injection and does not have anyone else who would be able to administer this daily for her. Thus at this time, I feel that the best choice to maintain her bone density and avoid further fragility fractures would be Prolia. \par \par - prolia administered today, tolerated well\par - c/w Ca/Vit D supplementation \par - pt has seen dentist, no planned oral surgery\par - post-injection instructions given, RTC in 6 months for next injection -- will have labs done prior to administration \par

## 2019-07-29 NOTE — PHYSICAL EXAM
[General Appearance - Alert] : alert [General Appearance - In No Acute Distress] : in no acute distress [General Appearance - Well Nourished] : well nourished [Sclera] : the sclera and conjunctiva were normal [PERRL With Normal Accommodation] : pupils were equal in size, round, and reactive to light [Extraocular Movements] : extraocular movements were intact [Neck Appearance] : the appearance of the neck was normal [Auscultation Breath Sounds / Voice Sounds] : lungs were clear to auscultation bilaterally [Heart Rate And Rhythm] : heart rate was normal and rhythm regular [Heart Sounds] : normal S1 and S2 [Heart Sounds Gallop] : no gallops [Murmurs] : no murmurs [Heart Sounds Pericardial Friction Rub] : no pericardial rub [Cervical Lymph Nodes Enlarged Posterior Bilaterally] : posterior cervical [Cervical Lymph Nodes Enlarged Anterior Bilaterally] : anterior cervical [Supraclavicular Lymph Nodes Enlarged Bilaterally] : supraclavicular [No CVA Tenderness] : no ~M costovertebral angle tenderness [Musculoskeletal - Swelling] : no joint swelling seen [Nail Clubbing] : no clubbing  or cyanosis of the fingernails [FreeTextEntry1] : slow gait [Motor Tone] : muscle strength and tone were normal [] : no rash [Skin Color & Pigmentation] : normal skin color and pigmentation [No Focal Deficits] : no focal deficits [Oriented To Time, Place, And Person] : oriented to person, place, and time [Impaired Insight] : insight and judgment were intact [Affect] : the affect was normal

## 2019-07-29 NOTE — PHYSICAL EXAM
[General Appearance - Alert] : alert [General Appearance - In No Acute Distress] : in no acute distress [General Appearance - Well Nourished] : well nourished [Sclera] : the sclera and conjunctiva were normal [PERRL With Normal Accommodation] : pupils were equal in size, round, and reactive to light [Extraocular Movements] : extraocular movements were intact [Neck Appearance] : the appearance of the neck was normal [Auscultation Breath Sounds / Voice Sounds] : lungs were clear to auscultation bilaterally [Heart Rate And Rhythm] : heart rate was normal and rhythm regular [Heart Sounds] : normal S1 and S2 [Heart Sounds Gallop] : no gallops [Murmurs] : no murmurs [Heart Sounds Pericardial Friction Rub] : no pericardial rub [Cervical Lymph Nodes Enlarged Posterior Bilaterally] : posterior cervical [Supraclavicular Lymph Nodes Enlarged Bilaterally] : supraclavicular [Cervical Lymph Nodes Enlarged Anterior Bilaterally] : anterior cervical [No CVA Tenderness] : no ~M costovertebral angle tenderness [Nail Clubbing] : no clubbing  or cyanosis of the fingernails [Musculoskeletal - Swelling] : no joint swelling seen [Motor Tone] : muscle strength and tone were normal [FreeTextEntry1] : slow gait [Skin Color & Pigmentation] : normal skin color and pigmentation [] : no rash [Oriented To Time, Place, And Person] : oriented to person, place, and time [No Focal Deficits] : no focal deficits [Impaired Insight] : insight and judgment were intact [Affect] : the affect was normal

## 2019-07-29 NOTE — HISTORY OF PRESENT ILLNESS
[FreeTextEntry1] : KATIE GONZALEZ is a 74 year old woman who presents for hx of osteopenia and recent spinal compression fractures in the past year despite recent DEXA in Jan 2019 with continued osteopenia. \par \par Reports dx with Osteopenia 20 years ago and completed 5 years of Actonel at that time and has since been on drug holiday, taking [id number]  and Vit D 4K IU supplementation daily and levels in normal range on recent b/w. Tolerated bisphosphonate without SE. \par \par T12 spontaneous compression fx 7/2018 with vertebroplasty 8/2018 without significant improvement in pain. T7/T9 spontaneous fx in 1/2019. Denies hip or femur fractures, + former tobacco. Denies hx of steroid use, RA/inflammatory arthritis. Pt is adopted. \par \par Currently with continued pain on prolonged standing and unable to walk long distances 2/2 pain in back. Denies LE weakness, paresthesias, saddle anesthesia, incontinence. No F/C, recent infections. \par \par Jan 2019 admission for decreased PO intake, RACHEL 2/2 CT PO contrast? and found with duodenal ulcers, now reportedly healed on repeat EGD. No current GI symptoms. Cr on recent labs 1.4 to 1.6, GFR 32. \par \par Today here for first Prolia injection as is considered clinically osteoporotic given multiple compression fx. No F/C, recent infections, CP, SOB. All questions about medication answered. No recent trips, falls, new or worsening back pain.

## 2019-07-29 NOTE — PROCEDURE
[Today's Date:] : Date: [unfilled] [Patient] : the patient [Risks] : risks [Benefits] : benefits [Alternatives] : alternatives [Consent Obtained] : written consent was obtained prior to the procedure and is detailed in the patient's record [Alcohol] : alcohol [Tolerated Well] : the patient tolerated the procedure well [No Complications] : there were no complications [Patient Instructed to Call] : patient was instructed to call if redness at site, a decrease in range of motion or an increase in pain is noted after procedure. [Instructions Given] : handouts/patient instructions were given to patient [FreeTextEntry1] : Pt feels well today, no recent infections, fevers/chills. Prolia 60mg administered subcutaneously into L upper arm after site cleansed with alcohol. Pt tolerated well, no leakage of medication from injection site, dressed with Band-Aid. \par \par Pt educated that site may be sore post-injection, she could experience mild malaise. Advised to call office if develops any pain, swelling, redness at site, new fevers or chills. \par \par Lot [id number], Exp 8/2021

## 2019-07-30 ENCOUNTER — RX RENEWAL (OUTPATIENT)
Age: 74
End: 2019-07-30

## 2019-09-23 ENCOUNTER — RX RENEWAL (OUTPATIENT)
Age: 74
End: 2019-09-23

## 2019-11-19 ENCOUNTER — RX RENEWAL (OUTPATIENT)
Age: 74
End: 2019-11-19

## 2019-11-26 ENCOUNTER — RX RENEWAL (OUTPATIENT)
Age: 74
End: 2019-11-26

## 2019-12-05 NOTE — PROGRESS NOTE ADULT - ASSESSMENT
[facility]cer Center Progress Note    Patient Name: Beth Elena   YOB: 1956   Medical Record Number: [id number]   Attending Physician: Pattie Campbell M.D.      Chief Complaint:  Breast cancer (PCP: Dr Quinten Garcia)    Oncology History:  59year old pos of malignancy in the dermal lymphatics or the overlying skin.   [place] lymph node was positive with extracapsular extension she proceeded to have axillary dissection which showed 2 of 22 regional lymph nodes had metastatic cancer without extracapsular ext 73yo female with abdominal pain, nausea and decreased oral intake with renal insufficiency likely due to dehydration  h/h decreased today likely somewhat dilutional    likely gastritis vs ulcer  I have added carafate and pantoprazole  continue IV hydration, await labs to see if bun/cr continue to improve  would benefit from upper endoscopy - outpt vs inpt  will follow h/h, if continued decrease will need egd  I suspect she will continue to improve with conservative therapy - IV hydration and pantoprazole and carafate her 35s and lives with clover who is a nurse    Current Medications:    Current Outpatient Medications:   •  mucositis mixture Oral Suspension, Swish and spit 10 mL 3 (three) times daily. , Disp: 500 mL, Rfl: 2  •  Fluticasone Propionate 50 MCG/ACT Nasal Concepcion Signs:   /90 (BP Location: Right arm, Patient Position: Sitting, Cuff Size: adult)   Pulse 95   Temp 98.8 °F (37.1 °C) (Oral)   Resp 18   Ht 1.626 m (5' 4\")   Wt 83 kg (183 lb)   LMP 07/14/2006   SpO2 99%   BMI 31.41 kg/m²     Physical Examination: atherosclerosis. 4.  Nonobstructing left renal stone. 5.  Cholelithiasis. No biliary ductal dilatation. 6.  Small hiatal hernia.    Dictated by (CST): Niall Foley MD on 6/29/2019 at 12:17     Approved by (CST): Niall Foley MD on 6/29/2019 at 12:35

## 2020-01-21 ENCOUNTER — RX RENEWAL (OUTPATIENT)
Age: 75
End: 2020-01-21

## 2020-02-03 ENCOUNTER — APPOINTMENT (OUTPATIENT)
Dept: RHEUMATOLOGY | Facility: CLINIC | Age: 75
End: 2020-02-03
Payer: MEDICARE

## 2020-02-03 VITALS
SYSTOLIC BLOOD PRESSURE: 120 MMHG | WEIGHT: 121 LBS | DIASTOLIC BLOOD PRESSURE: 60 MMHG | HEART RATE: 65 BPM | BODY MASS INDEX: 22.26 KG/M2 | OXYGEN SATURATION: 90 % | HEIGHT: 62 IN

## 2020-02-03 DIAGNOSIS — S22.080A WEDGE COMPRESSION FRACTURE OF T11-T12 VERTEBRA, INITIAL ENCOUNTER FOR CLOSED FRACTURE: ICD-10-CM

## 2020-02-03 PROCEDURE — 96401 CHEMO ANTI-NEOPL SQ/IM: CPT

## 2020-02-03 RX ORDER — DENOSUMAB 60 MG/ML
60 INJECTION SUBCUTANEOUS
Qty: 1 | Refills: 0 | Status: COMPLETED | OUTPATIENT
Start: 2020-02-03

## 2020-02-03 RX ADMIN — DENOSUMAB 0 MG/ML: 60 INJECTION SUBCUTANEOUS at 00:00

## 2020-02-03 NOTE — PROCEDURE
[Today's Date:] : Date: [unfilled] [Risks] : risks [Benefits] : benefits [Patient] : the patient [Alternatives] : alternatives [Consent Obtained] : written consent was obtained prior to the procedure and is detailed in the patient's record [Tolerated Well] : the patient tolerated the procedure well [Alcohol] : alcohol [No Complications] : there were no complications [Patient Instructed to Call] : patient was instructed to call if redness at site, a decrease in range of motion or an increase in pain is noted after procedure. [Instructions Given] : handouts/patient instructions were given to patient [FreeTextEntry1] : Pt feels well today, no recent infections, fevers/chills. Prolia 60mg administered subcutaneously into L upper arm after site cleansed with alcohol. Pt tolerated well, no leakage of medication from injection site, dressed with Band-Aid. \par \par Pt educated that site may be sore post-injection, she could experience mild malaise. Advised to call office if develops any pain, swelling, redness at site, new fevers or chills. \par \par Lot [id number], Exp 4/2022

## 2020-02-03 NOTE — PHYSICAL EXAM
[General Appearance - Alert] : alert [General Appearance - In No Acute Distress] : in no acute distress [General Appearance - Well Nourished] : well nourished [Neck Appearance] : the appearance of the neck was normal [Auscultation Breath Sounds / Voice Sounds] : lungs were clear to auscultation bilaterally [Heart Sounds Gallop] : no gallops [Heart Sounds] : normal S1 and S2 [Heart Rate And Rhythm] : heart rate was normal and rhythm regular [Murmurs] : no murmurs [Heart Sounds Pericardial Friction Rub] : no pericardial rub [No CVA Tenderness] : no ~M costovertebral angle tenderness [Nail Clubbing] : no clubbing  or cyanosis of the fingernails [Motor Tone] : muscle strength and tone were normal [Musculoskeletal - Swelling] : no joint swelling seen [Skin Color & Pigmentation] : normal skin color and pigmentation [] : no rash [Oriented To Time, Place, And Person] : oriented to person, place, and time [No Focal Deficits] : no focal deficits [Impaired Insight] : insight and judgment were intact [Affect] : the affect was normal [FreeTextEntry1] : slow gait

## 2020-02-03 NOTE — HISTORY OF PRESENT ILLNESS
[FreeTextEntry1] : KATIE GONZALEZ is a 74 year old woman who presents for hx of osteopenia and recent spinal compression fractures in the past year despite recent DEXA in Jan 2019 with continued osteopenia. \par \par Reports dx with Osteopenia 20 years ago and completed 5 years of Actonel at that time and has since been on drug holiday, taking [id number]  and Vit D 4K IU supplementation daily and levels in normal range on recent b/w. Tolerated bisphosphonate without SE. \par \par T12 spontaneous compression fx 7/2018 with vertebroplasty 8/2018 without significant improvement in pain. T7/T9 spontaneous fx in 1/2019. Denies hip or femur fractures, + former tobacco. Denies hx of steroid use, RA/inflammatory arthritis. Pt is adopted. \par \par Currently with continued pain on prolonged standing and unable to walk long distances 2/2 pain in back. Denies LE weakness, paresthesias, saddle anesthesia, incontinence. No F/C, recent infections. \par \par Jan 2019 admission for decreased PO intake, RACHEL 2/2 CT PO contrast? and found with duodenal ulcers, now reportedly healed on repeat EGD. No current GI symptoms. Cr on recent labs 1.4 to 1.6, GFR 32. \par \par Today here for first Prolia injection as is considered clinically osteoporotic given multiple compression fx. No F/C, recent infections, CP, SOB. All questions about medication answered. No recent trips, falls, new or worsening back pain.

## 2020-05-07 ENCOUNTER — RX RENEWAL (OUTPATIENT)
Age: 75
End: 2020-05-07

## 2020-05-11 ENCOUNTER — RX RENEWAL (OUTPATIENT)
Age: 75
End: 2020-05-11

## 2020-05-11 RX ORDER — PANTOPRAZOLE 40 MG/1
40 TABLET, DELAYED RELEASE ORAL
Qty: 120 | Refills: 0 | Status: ACTIVE | COMMUNITY
Start: 2019-07-30 | End: 1900-01-01

## 2020-08-10 ENCOUNTER — APPOINTMENT (OUTPATIENT)
Dept: RHEUMATOLOGY | Facility: CLINIC | Age: 75
End: 2020-08-10
Payer: MEDICARE

## 2020-08-10 VITALS
HEIGHT: 61 IN | HEART RATE: 63 BPM | OXYGEN SATURATION: 93 % | WEIGHT: 116 LBS | TEMPERATURE: 96.3 F | SYSTOLIC BLOOD PRESSURE: 100 MMHG | BODY MASS INDEX: 21.9 KG/M2 | DIASTOLIC BLOOD PRESSURE: 60 MMHG

## 2020-08-10 DIAGNOSIS — M85.88 OTHER SPECIFIED DISORDERS OF BONE DENSITY AND STRUCTURE, OTHER SITE: ICD-10-CM

## 2020-08-10 PROCEDURE — 96401 CHEMO ANTI-NEOPL SQ/IM: CPT

## 2020-08-10 RX ORDER — DENOSUMAB 60 MG/ML
60 INJECTION SUBCUTANEOUS
Qty: 1 | Refills: 0 | Status: COMPLETED | OUTPATIENT
Start: 2020-08-10

## 2020-08-10 RX ADMIN — DENOSUMAB 0 MG/ML: 60 INJECTION SUBCUTANEOUS at 00:00

## 2020-08-10 NOTE — PROCEDURE
[Today's Date:] : Date: [unfilled] [Patient] : the patient [Risks] : risks [Benefits] : benefits [Consent Obtained] : written consent was obtained prior to the procedure and is detailed in the patient's record [Alternatives] : alternatives [Alcohol] : alcohol [Tolerated Well] : the patient tolerated the procedure well [No Complications] : there were no complications [Instructions Given] : handouts/patient instructions were given to patient [Patient Instructed to Call] : patient was instructed to call if redness at site, a decrease in range of motion or an increase in pain is noted after procedure. [FreeTextEntry1] : Pt feels well today, no recent infections, fevers/chills. Prolia 60mg administered subcutaneously into L upper arm after site cleansed with alcohol. Pt tolerated well, no leakage of medication from injection site, dressed with Band-Aid. \par \par Pt educated that site may be sore post-injection, she could experience mild malaise. Advised to call office if develops any pain, swelling, redness at site, new fevers or chills. \par \par Lot [id number], Exp 10/2022

## 2020-08-10 NOTE — ASSESSMENT
[FreeTextEntry1] : KATIE GONZALEZ is a 75 year old woman with long-standing osteopenia, who completed 5 years of Actonel in the past, off bisphosphonates x approx 15 years. Recurrent spontaneous spinal compression fractures x 3 since 2018, s/p vertebroplasty for one but still with spinal pain and limitation to mobility including prolonged standing and walking limiting functionality and increasing future fall risk. Hx of RACHEL s/p IV contrast in Jan and Cr 1.6 on recent labs with GFR 32. Despite DEXA report of osteopenia with over all fracture risk 18.2, and hip fracture 3.6, clinically this patient must be considered in the osteoporotic range given her multiple, spontaneous vertebral fractures without any other risk factors such as inflammatory arthritis, etc that could be modified at this time. Concern for fall risk given pain that limits ADLs and walking. She is not a candidate for Reclast given her renal function since January which has not improved, and after extensive discussion about Forteo she declined as she does not feel comfortable with self-injection and does not have anyone else who would be able to administer this daily for her. Thus at this time, I feel that the best choice to maintain her bone density and avoid further fragility fractures would be Prolia. \par \par No recent issues, has been tolerating Prolia well. \par \par - prolia administered today, tolerated well\par - c/w Ca/Vit D supplementation \par - pt has seen dentist, no planned oral surgery\par - post-injection instructions given, RTC in 6 months for next injection -- will have labs done with PMD prior to administration \par

## 2020-08-10 NOTE — PROCEDURE
[Today's Date:] : Date: [unfilled] [Risks] : risks [Patient] : the patient [Consent Obtained] : written consent was obtained prior to the procedure and is detailed in the patient's record [Alternatives] : alternatives [Benefits] : benefits [Alcohol] : alcohol [Tolerated Well] : the patient tolerated the procedure well [Instructions Given] : handouts/patient instructions were given to patient [No Complications] : there were no complications [Patient Instructed to Call] : patient was instructed to call if redness at site, a decrease in range of motion or an increase in pain is noted after procedure. [FreeTextEntry1] : Pt feels well today, no recent infections, fevers/chills. Prolia 60mg administered subcutaneously into L upper arm after site cleansed with alcohol. Pt tolerated well, no leakage of medication from injection site, dressed with Band-Aid. \par \par Pt educated that site may be sore post-injection, she could experience mild malaise. Advised to call office if develops any pain, swelling, redness at site, new fevers or chills. \par \par Lot [id number], Exp 10/2022

## 2021-01-14 ENCOUNTER — RX RENEWAL (OUTPATIENT)
Age: 76
End: 2021-01-14

## 2021-02-22 ENCOUNTER — APPOINTMENT (OUTPATIENT)
Dept: RHEUMATOLOGY | Facility: CLINIC | Age: 76
End: 2021-02-22
Payer: MEDICARE

## 2021-02-22 VITALS
SYSTOLIC BLOOD PRESSURE: 102 MMHG | OXYGEN SATURATION: 96 % | HEART RATE: 76 BPM | DIASTOLIC BLOOD PRESSURE: 62 MMHG | WEIGHT: 118 LBS | TEMPERATURE: 97.6 F | BODY MASS INDEX: 22.3 KG/M2

## 2021-02-22 DIAGNOSIS — M85.80 OTHER SPECIFIED DISORDERS OF BONE DENSITY AND STRUCTURE, UNSPECIFIED SITE: ICD-10-CM

## 2021-02-22 PROCEDURE — 96372 THER/PROPH/DIAG INJ SC/IM: CPT

## 2021-02-22 RX ORDER — DENOSUMAB 60 MG/ML
60 INJECTION SUBCUTANEOUS
Qty: 1 | Refills: 0 | Status: COMPLETED | OUTPATIENT
Start: 2021-02-22

## 2021-02-22 RX ADMIN — DENOSUMAB 0 MG/ML: 60 INJECTION SUBCUTANEOUS at 00:00

## 2021-02-22 NOTE — PROCEDURE
[Today's Date:] : Date: [unfilled] [Patient] : the patient [Risks] : risks [Benefits] : benefits [Alternatives] : alternatives [Consent Obtained] : written consent was obtained prior to the procedure and is detailed in the patient's record [Alcohol] : alcohol [Tolerated Well] : the patient tolerated the procedure well [No Complications] : there were no complications [Instructions Given] : handouts/patient instructions were given to patient [Patient Instructed to Call] : patient was instructed to call if redness at site, a decrease in range of motion or an increase in pain is noted after procedure. [FreeTextEntry1] : Pt feels well today, no recent infections, fevers/chills. Prolia 60mg administered subcutaneously into L upper arm after site cleansed with alcohol. Pt tolerated well, no leakage of medication from injection site, dressed with Band-Aid. \par \par Pt educated that site may be sore post-injection, she could experience mild malaise. Advised to call office if develops any pain, swelling, redness at site, new fevers or chills. \par \par Lot [id number], Exp 4/23

## 2021-02-22 NOTE — ASSESSMENT
[FreeTextEntry1] : KATIE GONZALEZ is a 76 year old woman with long-standing osteopenia, who completed 5 years of Actonel in the past, off bisphosphonates x approx 15 years. Recurrent spontaneous spinal compression fractures x 3 since 2018, s/p vertebroplasty for one but still with spinal pain and limitation to mobility including prolonged standing and walking limiting functionality and increasing future fall risk. Hx of RACHEL s/p IV contrast in Jan and Cr 1.6 on recent labs with GFR 32. Despite DEXA report of osteopenia with over all fracture risk 18.2, and hip fracture 3.6, clinically this patient must be considered in the osteoporotic range given her multiple, spontaneous vertebral fractures without any other risk factors such as inflammatory arthritis, etc that could be modified at this time. Concern for fall risk given pain that limits ADLs and walking. She is not a candidate for Reclast given her renal function since January which has not improved, and after extensive discussion about Forteo she declined as she does not feel comfortable with self-injection and does not have anyone else who would be able to administer this daily for her. Thus at this time, I feel that the best choice to maintain her bone density and avoid further fragility fractures would be Prolia. \par \par No recent issues, has been tolerating Prolia well. \par \par - prolia administered today, tolerated well\par - c/w Ca/Vit D supplementation \par - will review recent labs, repeat prior to next injection \par - pt has seen dentist, no planned oral surgery\par - post-injection instructions given, RTC in 6 months for next injection \par - repeat DEXA \par

## 2021-08-18 ENCOUNTER — RX RENEWAL (OUTPATIENT)
Age: 76
End: 2021-08-18

## 2021-09-13 ENCOUNTER — APPOINTMENT (OUTPATIENT)
Dept: RHEUMATOLOGY | Facility: CLINIC | Age: 76
End: 2021-09-13
Payer: MEDICARE

## 2021-09-13 VITALS
SYSTOLIC BLOOD PRESSURE: 134 MMHG | BODY MASS INDEX: 22.67 KG/M2 | HEART RATE: 68 BPM | WEIGHT: 120 LBS | DIASTOLIC BLOOD PRESSURE: 77 MMHG | TEMPERATURE: 97.6 F | OXYGEN SATURATION: 98 %

## 2021-09-13 PROCEDURE — 96372 THER/PROPH/DIAG INJ SC/IM: CPT

## 2021-09-13 RX ORDER — DENOSUMAB 60 MG/ML
60 INJECTION SUBCUTANEOUS
Qty: 1 | Refills: 0 | Status: COMPLETED | OUTPATIENT
Start: 2021-09-13

## 2021-09-13 RX ADMIN — DENOSUMAB 0 MG/ML: 60 INJECTION SUBCUTANEOUS at 00:00

## 2021-09-13 NOTE — ASSESSMENT
[FreeTextEntry1] : KATIE GONZALEZ is a 76 year old woman with long-standing osteopenia, who completed 5 years of Actonel in the past, off bisphosphonates x approx 15 years. Recurrent spontaneous spinal compression fractures x 3 since 2018, s/p vertebroplasty for one but still with spinal pain and limitation to mobility including prolonged standing and walking limiting functionality and increasing future fall risk. Hx of RACHEL s/p IV contrast in Jan and Cr 1.6 on recent labs with GFR 32. Despite DEXA report of osteopenia with over all fracture risk 18.2, and hip fracture 3.6, clinically this patient must be considered in the osteoporotic range given her multiple, spontaneous vertebral fractures without any other risk factors such as inflammatory arthritis, etc that could be modified at this time. Concern for fall risk given pain that limits ADLs and walking. She is not a candidate for Reclast given her renal function since January which has not improved, and after extensive discussion about Forteo she declined as she does not feel comfortable with self-injection and does not have anyone else who would be able to administer this daily for her. Thus at this time, I feel that the best choice to maintain her bone density and avoid further fragility fractures would be Prolia. Has been tolerating well. \par \par - prolia administered today, tolerated well\par - c/w Ca/Vit D supplementation \par - recent labs stable, repeat prior to next injection \par - pt has seen dentist, no planned oral surgery\par - post-injection instructions given, RTC in 6 months for next injection \par - repeat DEXA in June 2023 or sooner if new fractures or change in level of risk \par

## 2021-09-13 NOTE — PROCEDURE
[Patient] : the patient [Risks] : risks [Benefits] : benefits [Alternatives] : alternatives [Consent Obtained] : written consent was obtained prior to the procedure and is detailed in the patient's record [Alcohol] : alcohol [Tolerated Well] : the patient tolerated the procedure well [No Complications] : there were no complications [Instructions Given] : handouts/patient instructions were given to patient [Patient Instructed to Call] : patient was instructed to call if redness at site, a decrease in range of motion or an increase in pain is noted after procedure. [FreeTextEntry1] : Pt feels well today, no recent infections, fevers/chills. Prolia 60mg administered subcutaneously into L upper arm after site cleansed with alcohol. Pt tolerated well, no leakage of medication from injection site, dressed with Band-Aid. \par \par Pt educated that site may be sore post-injection, she could experience mild malaise. Advised to call office if develops any pain, swelling, redness at site, new fevers or chills. \par  [Today's Date:] : Date: [unfilled]

## 2022-01-28 NOTE — PATIENT PROFILE ADULT - NSPROMEDSPUMP_GEN_A_NUR
Research is being conducted in regards to this issue.    TREVON Thompson [facility] Rep     none

## 2022-02-14 ENCOUNTER — APPOINTMENT (OUTPATIENT)
Dept: RHEUMATOLOGY | Facility: CLINIC | Age: 77
End: 2022-02-14
Payer: MEDICARE

## 2022-03-21 ENCOUNTER — APPOINTMENT (OUTPATIENT)
Dept: RHEUMATOLOGY | Facility: CLINIC | Age: 77
End: 2022-03-21
Payer: MEDICARE

## 2022-03-21 VITALS
DIASTOLIC BLOOD PRESSURE: 60 MMHG | TEMPERATURE: 97.9 F | OXYGEN SATURATION: 98 % | WEIGHT: 123 LBS | SYSTOLIC BLOOD PRESSURE: 110 MMHG | HEIGHT: 60 IN | BODY MASS INDEX: 24.15 KG/M2 | HEART RATE: 70 BPM

## 2022-03-21 PROCEDURE — 96372 THER/PROPH/DIAG INJ SC/IM: CPT

## 2022-03-21 RX ORDER — DENOSUMAB 60 MG/ML
60 INJECTION SUBCUTANEOUS
Qty: 1 | Refills: 0 | Status: COMPLETED | OUTPATIENT
Start: 2022-03-21

## 2022-03-21 RX ADMIN — DENOSUMAB 0 MG/ML: 60 INJECTION SUBCUTANEOUS at 00:00

## 2022-03-21 NOTE — PROCEDURE
[Today's Date:] : Date: [unfilled] [FreeTextEntry1] : Pt feels well today, no recent infections, fevers/chills. Prolia 60mg administered subcutaneously into L upper arm after site cleansed with alcohol. Pt tolerated well, no leakage of medication from injection site, dressed with Band-Aid. \par \par Pt educated that site may be sore post-injection, she could experience mild malaise. Advised to call office if develops any pain, swelling, redness at site, new fevers or chills. \par

## 2022-03-21 NOTE — ASSESSMENT
[FreeTextEntry1] : KATIE GONZALEZ is a 77 year old woman with long-standing osteopenia, who completed 5 years of Actonel in the past, off bisphosphonates x approx 15 years. Recurrent spontaneous spinal compression fractures x 3 since 2018, s/p vertebroplasty for one but still with spinal pain and limitation to mobility including prolonged standing and walking limiting functionality and increasing future fall risk. Hx of RACHEL s/p IV contrast in Jan and Cr 1.6 on recent labs with GFR 32. Despite DEXA report of osteopenia with over all fracture risk 18.2, and hip fracture 3.6, clinically this patient must be considered in the osteoporotic range given her multiple, spontaneous vertebral fractures without any other risk factors such as inflammatory arthritis, etc that could be modified at this time. Concern for fall risk given pain that limits ADLs and walking. She is not a candidate for Reclast given her renal function, and after extensive discussion about Forteo she declined as she does not feel comfortable with self-injection and does not have anyone else who would be able to administer this daily for her. Thus at this time, I feel that the best choice to maintain her bone density and avoid further fragility fractures would be Prolia. Has been tolerating well. \par \par - prolia administered today, tolerated well\par - c/w Ca/Vit D supplementation \par - recent labs stable, repeat prior to next injection \par - pt has seen dentist, no planned oral surgery\par - post-injection instructions given, RTC in 6 months for next injection \par - repeat DEXA in June 2023 or sooner if new fractures or change in level of risk \par

## 2022-09-26 ENCOUNTER — APPOINTMENT (OUTPATIENT)
Dept: RHEUMATOLOGY | Facility: CLINIC | Age: 77
End: 2022-09-26

## 2022-09-26 VITALS
HEART RATE: 75 BPM | OXYGEN SATURATION: 98 % | DIASTOLIC BLOOD PRESSURE: 70 MMHG | TEMPERATURE: 97.2 F | BODY MASS INDEX: 24.54 KG/M2 | WEIGHT: 125 LBS | HEIGHT: 60 IN | SYSTOLIC BLOOD PRESSURE: 160 MMHG

## 2022-09-26 PROCEDURE — 96372 THER/PROPH/DIAG INJ SC/IM: CPT

## 2022-09-26 PROCEDURE — 99213 OFFICE O/P EST LOW 20 MIN: CPT | Mod: 25

## 2022-09-26 RX ORDER — DENOSUMAB 60 MG/ML
60 INJECTION SUBCUTANEOUS
Qty: 1 | Refills: 0 | Status: COMPLETED | OUTPATIENT
Start: 2022-09-26

## 2022-09-26 RX ADMIN — DENOSUMAB 0 MG/ML: 60 INJECTION SUBCUTANEOUS at 00:00

## 2022-09-26 NOTE — PHYSICAL EXAM
[General Appearance - Alert] : alert [General Appearance - In No Acute Distress] : in no acute distress [General Appearance - Well Nourished] : well nourished [Sclera] : the sclera and conjunctiva were normal [Oropharynx] : the oropharynx was normal [Neck Appearance] : the appearance of the neck was normal [Auscultation Breath Sounds / Voice Sounds] : lungs were clear to auscultation bilaterally [Heart Rate And Rhythm] : heart rate was normal and rhythm regular [Heart Sounds] : normal S1 and S2 [Edema] : there was no peripheral edema [No Spinal Tenderness] : no spinal tenderness [Abnormal Walk] : normal gait [Nail Clubbing] : no clubbing  or cyanosis of the fingernails [Musculoskeletal - Swelling] : no joint swelling seen [Motor Tone] : muscle strength and tone were normal [FreeTextEntry1] : Oa changes without significant TTP, no synovitis/effusions  [] : no rash [No Focal Deficits] : no focal deficits [Oriented To Time, Place, And Person] : oriented to person, place, and time [Impaired Insight] : insight and judgment were intact [Affect] : the affect was normal

## 2022-09-26 NOTE — ASSESSMENT
[FreeTextEntry1] : KATIE GONZALEZ is a 77 year old woman with long-standing osteopenia, who completed 5 years of Actonel in the past, off bisphosphonates x approx 15 years. Recurrent spontaneous spinal compression fractures x 3 since 2018, s/p vertebroplasty for one but still with spinal pain and limitation to mobility including prolonged standing and walking limiting functionality and increasing future fall risk. Hx of RACHEL s/p IV contrast in Jan and Cr 1.6 on recent labs with GFR 32. Despite DEXA report of osteopenia with over all fracture risk 18.2, and hip fracture 3.6, clinically this patient must be considered in the osteoporotic range given her multiple, spontaneous vertebral fractures without any other risk factors such as inflammatory arthritis, etc that could be modified at this time. Concern for fall risk given pain that limits ADLs and walking. She is not a candidate for Reclast given her renal function, and after extensive discussion about Forteo she declined as she does not feel comfortable with self-injection and does not have anyone else who would be able to administer this daily for her. Thus at this time, I feel that the best choice to maintain her bone density and avoid further fragility fractures would be Prolia. Has been tolerating well. \par \par # clinical OP \par - prolia administered today, tolerated well\par - c/w dietary Ca/Vit D supplementation \par - strongly encouraged weight bearing exercise, reviewed options and goal length of time with her \par - check Vit D with upcoming PMD labs \par - pt has seen dentist, no planned oral surgery\par - repeat DEXA in June 2023 or sooner if new fractures or change in level of risk \par \par # OA related mild arthralgias, not limiting ADLs\par - prn tylenol\par \par RTC in 6 months for next Prolia, may be in [place] so ok to wait approx 1-2 months but advised against going past 3 months overdue due to potential loss of efficacy \par

## 2022-09-26 NOTE — HISTORY OF PRESENT ILLNESS
[FreeTextEntry1] : KATIE GONZALEZ is a 77 year old woman with hx of osteopenia and + spinal compression fractures in the past year despite recent DEXA in Jan 2019 with continued osteopenia. \par \par Reports dx with Osteopenia 20 years ago and completed 5 years of Actonel at that time and has since been on drug holiday, taking [id number] and Vit D 4K IU supplementation daily and levels in normal range on recent b/w. Tolerated bisphosphonate without SE. \par \par T12 spontaneous compression fx 7/2018 with vertebroplasty 8/2018 without significant improvement in pain. T7/T9 spontaneous fx in 1/2019. Denies hip or femur fractures, + former tobacco. Denies hx of steroid use, RA/inflammatory arthritis. Pt is adopted. \par \par Currently with continued pain on prolonged standing and unable to walk long distances 2/2 pain in back. Denies LE weakness, paresthesias, saddle anesthesia, incontinence. No F/C, recent infections. \par \par Jan 2019 admission for decreased PO intake, RACHEL 2/2 CT PO contrast? and found with duodenal ulcers, now reportedly healed on repeat EGD. No current GI symptoms. Cr on recent labs 1.4 to 1.6, GFR 32. \par \par Today here for first Prolia injection as is considered clinically osteoporotic given multiple compression fx. No F/C, recent infections, CP, SOB. All questions about medication answered. No recent trips, falls, new or worsening back pain. \par \par -----------\par 9/26/22 -- Has been doing well, no trips/falls, minimal weight bearing exercise tho, + supplemental Vit D use with dietary Ca, no upcoming dental sx. Mild OA related arthralgias but never with inflammatory or crystalline episodes.

## 2022-11-27 ENCOUNTER — EMERGENCY (EMERGENCY)
Facility: HOSPITAL | Age: 77
LOS: 0 days | Discharge: HOME | End: 2022-11-27
Attending: EMERGENCY MEDICINE
Payer: MEDICARE

## 2022-11-27 VITALS — HEIGHT: 61 IN | WEIGHT: 126.99 LBS

## 2022-11-27 VITALS
TEMPERATURE: 98 F | RESPIRATION RATE: 16 BRPM | DIASTOLIC BLOOD PRESSURE: 62 MMHG | SYSTOLIC BLOOD PRESSURE: 141 MMHG | OXYGEN SATURATION: 100 % | HEART RATE: 71 BPM

## 2022-11-27 DIAGNOSIS — E87.1 HYPO-OSMOLALITY AND HYPONATREMIA: ICD-10-CM

## 2022-11-27 DIAGNOSIS — R07.81 PLEURODYNIA: ICD-10-CM

## 2022-11-27 DIAGNOSIS — Z88.5 ALLERGY STATUS TO NARCOTIC AGENT: ICD-10-CM

## 2022-11-27 DIAGNOSIS — E04.1 NONTOXIC SINGLE THYROID NODULE: ICD-10-CM
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
## 2022-11-27 DIAGNOSIS — M54.9 DORSALGIA, UNSPECIFIED: ICD-10-CM

## 2022-11-27 DIAGNOSIS — S00.83XA CONTUSION OF OTHER PART OF HEAD, INITIAL ENCOUNTER: ICD-10-CM
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
## 2022-11-27 DIAGNOSIS — S20.02XA CONTUSION OF LEFT BREAST, INITIAL ENCOUNTER: ICD-10-CM

## 2022-11-27 DIAGNOSIS — S22.39XA FRACTURE OF ONE RIB, UNSPECIFIED SIDE, INITIAL ENCOUNTER FOR CLOSED FRACTURE: ICD-10-CM

## 2022-11-27 DIAGNOSIS — I10 ESSENTIAL (PRIMARY) HYPERTENSION: ICD-10-CM

## 2022-11-27 DIAGNOSIS — M25.562 PAIN IN LEFT KNEE: ICD-10-CM

## 2022-11-27 DIAGNOSIS — R60.0 LOCALIZED EDEMA: ICD-10-CM

## 2022-11-27 DIAGNOSIS — R74.01 ELEVATION OF LEVELS OF LIVER TRANSAMINASE LEVELS: ICD-10-CM

## 2022-11-27 DIAGNOSIS — S00.212A ABRASION OF LEFT EYELID AND PERIOCULAR AREA, INITIAL ENCOUNTER: ICD-10-CM

## 2022-11-27 LAB
ALBUMIN SERPL ELPH-MCNC: 3.8 G/DL — SIGNIFICANT CHANGE UP (ref 3.3–5)
ALP SERPL-CCNC: 114 U/L — SIGNIFICANT CHANGE UP (ref 40–120)
ALT FLD-CCNC: 143 U/L — HIGH (ref 12–78)
ANION GAP SERPL CALC-SCNC: 10 MMOL/L — SIGNIFICANT CHANGE UP (ref 5–17)
APTT BLD: 28.7 SEC — SIGNIFICANT CHANGE UP (ref 27.5–35.5)
AST SERPL-CCNC: 163 U/L — HIGH (ref 15–37)
BASOPHILS # BLD AUTO: 0.01 K/UL — SIGNIFICANT CHANGE UP (ref 0–0.2)
BASOPHILS NFR BLD AUTO: 0.2 % — SIGNIFICANT CHANGE UP (ref 0–2)
BILIRUB SERPL-MCNC: 0.6 MG/DL — SIGNIFICANT CHANGE UP (ref 0.2–1.2)
BUN SERPL-MCNC: 12 MG/DL — SIGNIFICANT CHANGE UP (ref 7–23)
CALCIUM SERPL-MCNC: 8.9 MG/DL — SIGNIFICANT CHANGE UP (ref 8.5–10.1)
CHLORIDE SERPL-SCNC: 95 MMOL/L — LOW (ref 96–108)
CO2 SERPL-SCNC: 24 MMOL/L — SIGNIFICANT CHANGE UP (ref 22–31)
CREAT SERPL-MCNC: 1 MG/DL — SIGNIFICANT CHANGE UP (ref 0.5–1.3)
EGFR: 58 ML/MIN/1.73M2 — LOW
EOSINOPHIL # BLD AUTO: 0 K/UL — SIGNIFICANT CHANGE UP (ref 0–0.5)
EOSINOPHIL NFR BLD AUTO: 0 % — SIGNIFICANT CHANGE UP (ref 0–6)
GLUCOSE SERPL-MCNC: 101 MG/DL — HIGH (ref 70–99)
HCT VFR BLD CALC: 33.8 % — LOW (ref 34.5–45)
HGB BLD-MCNC: 11.7 G/DL — SIGNIFICANT CHANGE UP (ref 11.5–15.5)
IMM GRANULOCYTES NFR BLD AUTO: 0.5 % — SIGNIFICANT CHANGE UP (ref 0–0.9)
INR BLD: 0.94 RATIO — SIGNIFICANT CHANGE UP (ref 0.88–1.16)
LYMPHOCYTES # BLD AUTO: 1.28 K/UL — SIGNIFICANT CHANGE UP (ref 1–3.3)
LYMPHOCYTES # BLD AUTO: 21.9 % — SIGNIFICANT CHANGE UP (ref 13–44)
MCHC RBC-ENTMCNC: 34.3 PG — HIGH (ref 27–34)
MCHC RBC-ENTMCNC: 34.6 GM/DL — SIGNIFICANT CHANGE UP (ref 32–36)
MCV RBC AUTO: 99.1 FL — SIGNIFICANT CHANGE UP (ref 80–100)
MONOCYTES # BLD AUTO: 0.91 K/UL — HIGH (ref 0–0.9)
MONOCYTES NFR BLD AUTO: 15.6 % — HIGH (ref 2–14)
NEUTROPHILS # BLD AUTO: 3.61 K/UL — SIGNIFICANT CHANGE UP (ref 1.8–7.4)
NEUTROPHILS NFR BLD AUTO: 61.8 % — SIGNIFICANT CHANGE UP (ref 43–77)
PLATELET # BLD AUTO: 182 K/UL — SIGNIFICANT CHANGE UP (ref 150–400)
POTASSIUM SERPL-MCNC: 4.2 MMOL/L — SIGNIFICANT CHANGE UP (ref 3.5–5.3)
POTASSIUM SERPL-SCNC: 4.2 MMOL/L — SIGNIFICANT CHANGE UP (ref 3.5–5.3)
PROT SERPL-MCNC: 7.6 GM/DL — SIGNIFICANT CHANGE UP (ref 6–8.3)
PROTHROM AB SERPL-ACNC: 10.9 SEC — SIGNIFICANT CHANGE UP (ref 10.5–13.4)
RBC # BLD: 3.41 M/UL — LOW (ref 3.8–5.2)
RBC # FLD: 12.1 % — SIGNIFICANT CHANGE UP (ref 10.3–14.5)
SODIUM SERPL-SCNC: 129 MMOL/L — LOW (ref 135–145)
WBC # BLD: 5.84 K/UL — SIGNIFICANT CHANGE UP (ref 3.8–10.5)
WBC # FLD AUTO: 5.84 K/UL — SIGNIFICANT CHANGE UP (ref 3.8–10.5)

## 2022-11-27 PROCEDURE — 71250 CT THORAX DX C-: CPT | Mod: MA

## 2022-11-27 PROCEDURE — 73562 X-RAY EXAM OF KNEE 3: CPT | Mod: 26,LT

## 2022-11-27 PROCEDURE — 73562 X-RAY EXAM OF KNEE 3: CPT | Mod: LT

## 2022-11-27 PROCEDURE — 99285 EMERGENCY DEPT VISIT HI MDM: CPT | Mod: 25

## 2022-11-27 PROCEDURE — 93971 EXTREMITY STUDY: CPT | Mod: LT

## 2022-11-27 PROCEDURE — 85610 PROTHROMBIN TIME: CPT

## 2022-11-27 PROCEDURE — 80053 COMPREHEN METABOLIC PANEL: CPT

## 2022-11-27 PROCEDURE — 85025 COMPLETE CBC W/AUTO DIFF WBC: CPT

## 2022-11-27 PROCEDURE — 99284 EMERGENCY DEPT VISIT MOD MDM: CPT | Mod: FS

## 2022-11-27 PROCEDURE — 71250 CT THORAX DX C-: CPT | Mod: 26,MA

## 2022-11-27 PROCEDURE — 93971 EXTREMITY STUDY: CPT | Mod: 26,LT

## 2022-11-27 PROCEDURE — 85730 THROMBOPLASTIN TIME PARTIAL: CPT

## 2022-11-27 PROCEDURE — 36415 COLL VENOUS BLD VENIPUNCTURE: CPT

## 2022-11-27 NOTE — ED ADULT TRIAGE NOTE - CHIEF COMPLAINT QUOTE
Pt presents to the ED c/o left-sided rib pain x1 week after mechanical fall. Pt reports pain with cough and inspiration. No medication PTA. Pt presents to the ED c/o left-sided rib pain x1 week after mechanical fall. Pt reports pain with cough and inspiration. Denies CP or SOB. No medication PTA.

## 2022-11-27 NOTE — ED ADULT NURSE NOTE - NSIMPLEMENTINTERV_GEN_ALL_ED
Implemented All Fall with Harm Risk Interventions:  [place] to call system. Call bell, personal items and telephone within reach. Instruct patient to call for assistance. Room bathroom lighting operational. Non-slip footwear when patient is off stretcher. Physically safe environment: no spills, clutter or unnecessary equipment. Stretcher in lowest position, wheels locked, appropriate side rails in place. Provide visual cue, wrist band, yellow gown, etc. Monitor gait and stability. Monitor for mental status changes and reorient to person, place, and time. Review medications for side effects contributing to fall risk. Reinforce activity limits and safety measures with patient and family. Provide visual clues: red socks.

## 2022-11-27 NOTE — ED STATDOCS - OBJECTIVE STATEMENT
49 y/o male with a PMHx of HTN presents to the ED c/o back pain . States she fell 11/20/22 on face, knee and L side. Denies LOC, back pain, neck pain. Notes her L sided pain has persisted over the last week and she now has pain when she coughs. No abd pain or R sided pain, only localized pain to L side. Also notes L knee pain, no limited ROM. Notes she doesn't have facial pain but notes some pain to palpation with abrasion to L eyebrow. 76 y/o female with a PMHx of HTN presents to the ED c/o back pain . States she fell 11/20/22 on face, knee and L side. Denies LOC, back pain, neck pain. Notes her L sided pain has persisted over the last week and she now has pain when she coughs. No abd pain or R sided pain, only localized pain to L side. Also notes L knee pain, no limited ROM. Notes she doesn't have facial pain but notes some pain to palpation with abrasion to L eyebrow.

## 2022-11-27 NOTE — ED STATDOCS - NSFOLLOWUPINSTRUCTIONS_ED_ALL_ED_FT
Rib Fracture       A rib fracture is a break or crack in one of the bones of the ribs. The ribs are like a cage that goes around your upper chest. A broken or cracked rib is often painful, but most do not cause other problems. Most rib fractures usually heal on their own in 1–3 months.      What are the causes?    •Doing movements over and over again with a lot of force, such as pitching a baseball or having a very bad cough.      •A direct hit to the chest.      •Cancer that has spread to the bones.        What are the signs or symptoms?    •Pain when you breathe in or cough.      •Pain when someone presses on the injured area.      •Feeling short of breath.        How is this treated?    Treatment depends on how bad the fracture is. In general:  •Most rib fractures usually heal on their own in 1–3 months.      •Healing may take longer if you have a cough or are doing activities that make the injury worse.      •While you heal, you may be given medicines to control pain.      •You will also be taught deep breathing exercises.      •Very bad injuries may require a stay at the hospital or surgery.        Follow these instructions at home:    Managing pain, stiffness, and swelling   •If told, put ice on the injured area. To do this:  •Put ice in a plastic bag.      •Place a towel between your skin and the bag.      •Leave the ice on for 20 minutes, 2–3 times a day.      •Take off the ice if your skin turns bright red. This is very important. If you cannot feel pain, heat, or cold, you have a greater risk of damage to the area.        •Take over-the-counter and prescription medicines only as told by your doctor.      Activity     •Avoid activities that cause pain to the injured area. Protect your injured area.      •Slowly increase activity as told by your doctor.      General instructions   •Do deep breathing exercises as told by your doctor. You may be told to:  •Take deep breaths many times a day.      •Cough several times a day while hugging a pillow.      •Use a device (incentive spirometer) to do deep breathing many times a day.        •Drink enough fluid to keep your pee (urine) clear or pale yellow.      • Do not wear a rib belt or binder.      •Keep all follow-up visits.        Contact a doctor if:    •You have a fever.        Get help right away if:    •You have trouble breathing.      •You are short of breath.      •You cannot stop coughing.      •You cough up thick or bloody spit.      •You feel like you may vomit (nauseous), vomit, or have belly (abdominal) pain.      •Your pain gets worse and medicine does not help.      These symptoms may be an emergency. Get help right away. Call your local emergency services (911 in the [place]   • Do not wait to see if the symptoms will go away.       • Do not drive yourself to the hospital.         Summary    •A rib fracture is a break or crack in one of the bones of the ribs.      •Apply ice to the injured area and take medicines for pain as told by your doctor.      •Take deep breaths and cough several times a day. Hug a pillow every time you cough.      This information is not intended to replace advice given to you by your health care provider. Make sure you discuss any questions you have with your health care provider.

## 2022-11-27 NOTE — ED ADULT NURSE NOTE - OBJECTIVE STATEMENT
Pt present to ED w c/o of L rib pain. Pt s/p mechanical fall 1 week ago while on vacation with her family. Pt did hit her head -AC use -LOC. Pt has bruising to the L side of her forehead, but states her pain is mainly on the L ribs. Pt states it hard to take deep breaths. Pt been taking ibuprofen at home with no relief. Came in for further evolution. No other complaints at this time.

## 2022-11-27 NOTE — ED STATDOCS - PATIENT PORTAL LINK FT
You can access the FollowMyHealth Patient Portal offered by [facility] by registering at the following website: [email or web address]. By joining [employer]’s FollowMyHealth portal, you will also be able to view your health information using other applications (apps) compatible with our system.

## 2022-11-27 NOTE — ED STATDOCS - NS ED ROS FT
Constitutional: No fever or chills  Eyes: No visual changes  HEENT: No throat pain  CV: No chest pain  Resp: No SOB no cough  GI: No abd pain, nausea or vomiting  : No dysuria  MSK: +L sided pain +L knee pain  Skin: No rash +L eyebrow abrasion   Neuro: No headache

## 2022-11-27 NOTE — ED STATDOCS - PHYSICAL EXAMINATION
Constitutional: NAD AOx3  Eyes: PERRL EOMI  Head: Normocephalic atraumatic  Mouth: MMM  Cardiac: regular rate and rhythm  Resp: Lungs CTAB  GI: Abd s/nd/nt  Neuro: CN2-12 grossly intact, BERMAN x 4  MSK: L forehead and periorbital yellow ecchymosis and abrasion to lateral L eyebrow, non tender c-spine, +TTP under L breast with contusion, L leg beneath L knee TTP with edema, L ankle with edema ans ecchymosis , non TTP.   Skin: No visible rashes Constitutional: NAD AOx3  Eyes: PERRL EOMI  Head: Normocephalic atraumatic  Mouth: MMM  Cardiac: regular rate and rhythm  Resp: Lungs CTAB  GI: Abd s/nd/nt  Neuro: CN2-12 grossly intact, BERMAN x 4  MSK: L forehead and periorbital yellow ecchymosis and abrasion to lateral L eyebrow, non tender c-spine, +TTP under L breast with contusion, L leg beneath L knee TTP with edema, L ankle with edema and ecchymosis , non TTP.   Skin: No visible rashes

## 2022-11-27 NOTE — ED STATDOCS - PROGRESS NOTE DETAILS
Patient seen and evaluated with ED attending at intake initially.  Reporting a fall 1 week ago while on vacation in , concerned she broke a rib.  +rib fracture where she has pain.  Incidental of compression fractures patient states are old, no current back pain.  incidental of thyroid nodule patient is already aware.  knee xray negative.  Labs with hyponatremia, patient states she limits salt as she is always told it is high, advised closed follow up PMD for recheck.  liver enzymes also elevated, patient states she had multiple glasses of wine daily the last week as [place], denies abdominal pain, no TTP on exam.  She will follow up PMD for recheck.  Gave patient an incentive spirometer, taught how to use, reviewed pain control and very strict return precautions -Bernie Khoury PA-C

## 2022-11-27 NOTE — ED ADULT NURSE NOTE - CHIEF COMPLAINT QUOTE
Pt presents to the ED c/o left-sided rib pain x1 week after mechanical fall. Pt reports pain with cough and inspiration. Denies CP or SOB. No medication PTA.

## 2023-03-06 ENCOUNTER — OUTPATIENT (OUTPATIENT)
Dept: EMERGENCY DEPT | Facility: HOSPITAL | Age: 78
LOS: 1 days | Discharge: HOME | End: 2023-03-06
Payer: MEDICARE

## 2023-03-06 VITALS
RESPIRATION RATE: 18 BRPM | HEIGHT: 61 IN | TEMPERATURE: 98 F | OXYGEN SATURATION: 99 % | DIASTOLIC BLOOD PRESSURE: 63 MMHG | HEART RATE: 64 BPM | SYSTOLIC BLOOD PRESSURE: 151 MMHG | WEIGHT: 125 LBS

## 2023-03-06 DIAGNOSIS — Z90.710 ACQUIRED ABSENCE OF BOTH CERVIX AND UTERUS: Chronic | ICD-10-CM

## 2023-03-06 DIAGNOSIS — K92.1 MELENA: ICD-10-CM

## 2023-03-06 LAB
ABO RH CONFIRMATION: SIGNIFICANT CHANGE UP
ALBUMIN SERPL ELPH-MCNC: 3.7 G/DL — SIGNIFICANT CHANGE UP (ref 3.3–5)
ALP SERPL-CCNC: 97 U/L — SIGNIFICANT CHANGE UP (ref 40–120)
ALT FLD-CCNC: 130 U/L — HIGH (ref 12–78)
ANION GAP SERPL CALC-SCNC: 6 MMOL/L — SIGNIFICANT CHANGE UP (ref 5–17)
APPEARANCE UR: CLEAR — SIGNIFICANT CHANGE UP
APTT BLD: 28.5 SEC — SIGNIFICANT CHANGE UP (ref 27.5–35.5)
AST SERPL-CCNC: 87 U/L — HIGH (ref 15–37)
BACTERIA # UR AUTO: ABNORMAL
BASOPHILS # BLD AUTO: 0.04 K/UL — SIGNIFICANT CHANGE UP (ref 0–0.2)
BASOPHILS NFR BLD AUTO: 0.3 % — SIGNIFICANT CHANGE UP (ref 0–2)
BILIRUB SERPL-MCNC: 0.6 MG/DL — SIGNIFICANT CHANGE UP (ref 0.2–1.2)
BILIRUB UR-MCNC: NEGATIVE — SIGNIFICANT CHANGE UP
BLD GP AB SCN SERPL QL: SIGNIFICANT CHANGE UP
BUN SERPL-MCNC: 15 MG/DL — SIGNIFICANT CHANGE UP (ref 7–23)
CALCIUM SERPL-MCNC: 8.6 MG/DL — SIGNIFICANT CHANGE UP (ref 8.5–10.1)
CHLORIDE SERPL-SCNC: 95 MMOL/L — LOW (ref 96–108)
CO2 SERPL-SCNC: 23 MMOL/L — SIGNIFICANT CHANGE UP (ref 22–31)
COLOR SPEC: YELLOW — SIGNIFICANT CHANGE UP
CREAT SERPL-MCNC: 0.93 MG/DL — SIGNIFICANT CHANGE UP (ref 0.5–1.3)
DIFF PNL FLD: ABNORMAL
EGFR: 63 ML/MIN/1.73M2 — SIGNIFICANT CHANGE UP
EOSINOPHIL # BLD AUTO: 0 K/UL — SIGNIFICANT CHANGE UP (ref 0–0.5)
EOSINOPHIL NFR BLD AUTO: 0 % — SIGNIFICANT CHANGE UP (ref 0–6)
EPI CELLS # UR: SIGNIFICANT CHANGE UP
FLUAV AG NPH QL: SIGNIFICANT CHANGE UP
FLUBV AG NPH QL: SIGNIFICANT CHANGE UP
GLUCOSE SERPL-MCNC: 100 MG/DL — HIGH (ref 70–99)
GLUCOSE UR QL: NEGATIVE — SIGNIFICANT CHANGE UP
HCT VFR BLD CALC: 38.7 % — SIGNIFICANT CHANGE UP (ref 34.5–45)
HGB BLD-MCNC: 13.3 G/DL — SIGNIFICANT CHANGE UP (ref 11.5–15.5)
HYALINE CASTS # UR AUTO: NEGATIVE /LPF — SIGNIFICANT CHANGE UP
IMM GRANULOCYTES NFR BLD AUTO: 0.4 % — SIGNIFICANT CHANGE UP (ref 0–0.9)
INR BLD: 0.93 RATIO — SIGNIFICANT CHANGE UP (ref 0.88–1.16)
KETONES UR-MCNC: ABNORMAL
LEUKOCYTE ESTERASE UR-ACNC: ABNORMAL
LIDOCAIN IGE QN: 224 U/L — SIGNIFICANT CHANGE UP (ref 73–393)
LYMPHOCYTES # BLD AUTO: 1.17 K/UL — SIGNIFICANT CHANGE UP (ref 1–3.3)
LYMPHOCYTES # BLD AUTO: 9.6 % — LOW (ref 13–44)
MCHC RBC-ENTMCNC: 34.4 GM/DL — SIGNIFICANT CHANGE UP (ref 32–36)
MCHC RBC-ENTMCNC: 35.2 PG — HIGH (ref 27–34)
MCV RBC AUTO: 102.4 FL — HIGH (ref 80–100)
MONOCYTES # BLD AUTO: 0.64 K/UL — SIGNIFICANT CHANGE UP (ref 0–0.9)
MONOCYTES NFR BLD AUTO: 5.3 % — SIGNIFICANT CHANGE UP (ref 2–14)
NEUTROPHILS # BLD AUTO: 10.26 K/UL — HIGH (ref 1.8–7.4)
NEUTROPHILS NFR BLD AUTO: 84.4 % — HIGH (ref 43–77)
NITRITE UR-MCNC: NEGATIVE — SIGNIFICANT CHANGE UP
PH UR: 5 — SIGNIFICANT CHANGE UP (ref 5–8)
PLATELET # BLD AUTO: 197 K/UL — SIGNIFICANT CHANGE UP (ref 150–400)
POTASSIUM SERPL-MCNC: 4.2 MMOL/L — SIGNIFICANT CHANGE UP (ref 3.5–5.3)
POTASSIUM SERPL-SCNC: 4.2 MMOL/L — SIGNIFICANT CHANGE UP (ref 3.5–5.3)
PROT SERPL-MCNC: 7.2 GM/DL — SIGNIFICANT CHANGE UP (ref 6–8.3)
PROT UR-MCNC: 30 MG/DL
PROTHROM AB SERPL-ACNC: 10.8 SEC — SIGNIFICANT CHANGE UP (ref 10.5–13.4)
RBC # BLD: 3.78 M/UL — LOW (ref 3.8–5.2)
RBC # FLD: 11.9 % — SIGNIFICANT CHANGE UP (ref 10.3–14.5)
RBC CASTS # UR COMP ASSIST: SIGNIFICANT CHANGE UP /HPF (ref 0–4)
RSV RNA NPH QL NAA+NON-PROBE: SIGNIFICANT CHANGE UP
SARS-COV-2 RNA SPEC QL NAA+PROBE: SIGNIFICANT CHANGE UP
SODIUM SERPL-SCNC: 124 MMOL/L — LOW (ref 135–145)
SP GR SPEC: 1.01 — SIGNIFICANT CHANGE UP (ref 1.01–1.02)
UROBILINOGEN FLD QL: NEGATIVE — SIGNIFICANT CHANGE UP
WBC # BLD: 12.16 K/UL — HIGH (ref 3.8–10.5)
WBC # FLD AUTO: 12.16 K/UL — HIGH (ref 3.8–10.5)
WBC UR QL: ABNORMAL /HPF (ref 0–5)

## 2023-03-06 PROCEDURE — 99223 1ST HOSP IP/OBS HIGH 75: CPT

## 2023-03-06 PROCEDURE — 99222 1ST HOSP IP/OBS MODERATE 55: CPT

## 2023-03-06 PROCEDURE — 88342 IMHCHEM/IMCYTCHM 1ST ANTB: CPT

## 2023-03-06 PROCEDURE — 88305 TISSUE EXAM BY PATHOLOGIST: CPT

## 2023-03-06 PROCEDURE — 36415 COLL VENOUS BLD VENIPUNCTURE: CPT

## 2023-03-06 PROCEDURE — 93010 ELECTROCARDIOGRAM REPORT: CPT

## 2023-03-06 PROCEDURE — C9113: CPT

## 2023-03-06 PROCEDURE — 85027 COMPLETE CBC AUTOMATED: CPT

## 2023-03-06 PROCEDURE — 71045 X-RAY EXAM CHEST 1 VIEW: CPT | Mod: 26

## 2023-03-06 PROCEDURE — 80048 BASIC METABOLIC PNL TOTAL CA: CPT

## 2023-03-06 PROCEDURE — 88312 SPECIAL STAINS GROUP 1: CPT

## 2023-03-06 PROCEDURE — 74177 CT ABD & PELVIS W/CONTRAST: CPT | Mod: 26,MA

## 2023-03-06 PROCEDURE — 99285 EMERGENCY DEPT VISIT HI MDM: CPT

## 2023-03-06 PROCEDURE — 88313 SPECIAL STAINS GROUP 2: CPT

## 2023-03-06 RX ORDER — METOPROLOL TARTRATE 50 MG
0 TABLET ORAL
Qty: 0 | Refills: 0 | DISCHARGE

## 2023-03-06 RX ORDER — LANOLIN ALCOHOL/MO/W.PET/CERES
3 CREAM (GRAM) TOPICAL AT BEDTIME
Refills: 0 | Status: DISCONTINUED | OUTPATIENT
Start: 2023-03-06 | End: 2023-03-07

## 2023-03-06 RX ORDER — METOPROLOL TARTRATE 50 MG
100 TABLET ORAL DAILY
Refills: 0 | Status: DISCONTINUED | OUTPATIENT
Start: 2023-03-06 | End: 2023-03-07

## 2023-03-06 RX ORDER — PANTOPRAZOLE SODIUM 20 MG/1
40 TABLET, DELAYED RELEASE ORAL ONCE
Refills: 0 | Status: COMPLETED | OUTPATIENT
Start: 2023-03-06 | End: 2023-03-06

## 2023-03-06 RX ORDER — PANTOPRAZOLE SODIUM 20 MG/1
8 TABLET, DELAYED RELEASE ORAL
Qty: 80 | Refills: 0 | Status: DISCONTINUED | OUTPATIENT
Start: 2023-03-06 | End: 2023-03-07

## 2023-03-06 RX ORDER — SODIUM CHLORIDE 9 MG/ML
1000 INJECTION INTRAMUSCULAR; INTRAVENOUS; SUBCUTANEOUS ONCE
Refills: 0 | Status: COMPLETED | OUTPATIENT
Start: 2023-03-06 | End: 2023-03-06

## 2023-03-06 RX ORDER — ONDANSETRON 8 MG/1
4 TABLET, FILM COATED ORAL EVERY 8 HOURS
Refills: 0 | Status: DISCONTINUED | OUTPATIENT
Start: 2023-03-06 | End: 2023-03-07

## 2023-03-06 RX ORDER — METOPROLOL TARTRATE 50 MG
50 TABLET ORAL AT BEDTIME
Refills: 0 | Status: DISCONTINUED | OUTPATIENT
Start: 2023-03-06 | End: 2023-03-07

## 2023-03-06 RX ORDER — MORPHINE SULFATE 50 MG/1
4 CAPSULE, EXTENDED RELEASE ORAL ONCE
Refills: 0 | Status: DISCONTINUED | OUTPATIENT
Start: 2023-03-06 | End: 2023-03-06

## 2023-03-06 RX ORDER — ONDANSETRON 8 MG/1
4 TABLET, FILM COATED ORAL ONCE
Refills: 0 | Status: COMPLETED | OUTPATIENT
Start: 2023-03-06 | End: 2023-03-06

## 2023-03-06 RX ORDER — SIMVASTATIN 20 MG/1
0 TABLET, FILM COATED ORAL
Qty: 0 | Refills: 0 | DISCHARGE

## 2023-03-06 RX ORDER — ACETAMINOPHEN 500 MG
650 TABLET ORAL EVERY 6 HOURS
Refills: 0 | Status: DISCONTINUED | OUTPATIENT
Start: 2023-03-06 | End: 2023-03-07

## 2023-03-06 RX ORDER — SODIUM CHLORIDE 9 MG/ML
1000 INJECTION INTRAMUSCULAR; INTRAVENOUS; SUBCUTANEOUS
Refills: 0 | Status: DISCONTINUED | OUTPATIENT
Start: 2023-03-06 | End: 2023-03-07

## 2023-03-06 RX ADMIN — SODIUM CHLORIDE 125 MILLILITER(S): 9 INJECTION INTRAMUSCULAR; INTRAVENOUS; SUBCUTANEOUS at 12:07

## 2023-03-06 RX ADMIN — Medication 50 MILLIGRAM(S): at 21:52

## 2023-03-06 RX ADMIN — ONDANSETRON 4 MILLIGRAM(S): 8 TABLET, FILM COATED ORAL at 07:48

## 2023-03-06 RX ADMIN — PANTOPRAZOLE SODIUM 10 MG/HR: 20 TABLET, DELAYED RELEASE ORAL at 12:06

## 2023-03-06 RX ADMIN — PANTOPRAZOLE SODIUM 10 MG/HR: 20 TABLET, DELAYED RELEASE ORAL at 22:23

## 2023-03-06 RX ADMIN — SODIUM CHLORIDE 1000 MILLILITER(S): 9 INJECTION INTRAMUSCULAR; INTRAVENOUS; SUBCUTANEOUS at 10:53

## 2023-03-06 RX ADMIN — MORPHINE SULFATE 4 MILLIGRAM(S): 50 CAPSULE, EXTENDED RELEASE ORAL at 07:48

## 2023-03-06 RX ADMIN — PANTOPRAZOLE SODIUM 40 MILLIGRAM(S): 20 TABLET, DELAYED RELEASE ORAL at 07:48

## 2023-03-06 NOTE — H&P ADULT - NSHPPHYSICALEXAM_GEN_ALL_CORE
Vital Signs Last 24 Hrs  T(C): 36.4 (06 Mar 2023 12:15), Max: 36.9 (06 Mar 2023 06:26)  T(F): 97.6 (06 Mar 2023 12:15), Max: 98.4 (06 Mar 2023 06:26)  HR: 65 (06 Mar 2023 12:15) (64 - 65)  BP: 141/73 (06 Mar 2023 12:15) (141/73 - 151/63)  BP(mean): 94 (06 Mar 2023 12:15) (90 - 94)  RR: 16 (06 Mar 2023 12:15) (16 - 18)  SpO2: 99% (06 Mar 2023 12:15) (99% - 99%)    Parameters below as of 06 Mar 2023 12:15  Patient On (Oxygen Delivery Method): room air

## 2023-03-06 NOTE — H&P ADULT - ASSESSMENT
#Epigastric pain likely duodenal ulcer with prior h/o it  Admit to med-surg  GI eval noted  Cont Protonix drip  Monitor HH  For EGD tomorrow  Clears then NPO p MN    #HTN- cont metoprolol    #Hyperlipidemia- hold statin, resume on discharge    #Hyponatremia  Will get renal eval  Monitor BMP    #DVt proph- ambulate    #Dispo- inpatient admit

## 2023-03-06 NOTE — ED ADULT TRIAGE NOTE - CHIEF COMPLAINT QUOTE
Pt coming from home c/o abdominal pain x1 week, N/V. Decreased PO intake. Last dose of tylenol @0200. Allergy to codeine. Hx of bleeding ulcers. Pt states s/s feel the same. Pt states "I think I noticed some blood in my stool this morning.

## 2023-03-06 NOTE — PATIENT PROFILE ADULT - FALL HARM RISK - HARM RISK INTERVENTIONS
Assistance with ambulation/Communicate Risk of Fall with Harm to all staff/Monitor for mental status changes/Monitor gait and stability/Provide patient with walking aids - walker, cane, crutches/Sit up slowly, dangle for a short time, stand at bedside before walking/Toileting schedule using arm’s reach rule for commode and bathroom/Use of alarms - bed, chair and/or voice tab/Visual Cue: Yellow wristband and red socks/Bed in lowest position, wheels locked, appropriate side rails in place/Call bell, personal items and telephone in reach/Instruct patient to call for assistance before getting out of bed or chair/Non-slip footwear when patient is out of bed/[place] to call system/Purposeful Proactive Rounding/Room/bathroom lighting operational, light cord in reach

## 2023-03-06 NOTE — H&P ADULT - NSHPLABSRESULTS_GEN_ALL_CORE
13.3   12.16 )-----------( 197      ( 06 Mar 2023 08:16 )             38.7     06 Mar 2023 08:16    124    |  95     |  15     ----------------------------<  100    4.2     |  23     |  0.93     Ca    8.6        06 Mar 2023 08:16    TPro  7.2    /  Alb  3.7    /  TBili  0.6    /  DBili  x      /  AST  87     /  ALT  130    /  AlkPhos  97     06 Mar 2023 08:16    LIVER FUNCTIONS - ( 06 Mar 2023 08:16 )  Alb: 3.7 g/dL / Pro: 7.2 gm/dL / ALK PHOS: 97 U/L / ALT: 130 U/L / AST: 87 U/L / GGT: x           PT/INR - ( 06 Mar 2023 08:16 )   PT: 10.8 sec;   INR: 0.93 ratio         PTT - ( 06 Mar 2023 08:16 )  PTT:28.5 sec      Urinalysis Basic - ( 06 Mar 2023 07:16 )  Color: Yellow / Appearance: Clear / S.015 / pH: x  Gluc: x / Ketone: Small  / Bili: Negative / Urobili: Negative   Blood: x / Protein: 30 mg/dL / Nitrite: Negative   Leuk Esterase: Trace / RBC: 0-2 /HPF / WBC 6-10 /HPF   Sq Epi: x / Non Sq Epi: Few / Bacteria: Occasional

## 2023-03-06 NOTE — CONSULT NOTE ADULT - ASSESSMENT
78 year old female with 7 day history epigastric pain, vomiting, loss of appetite with now melena and history duodenal ulcers not on PPI.    Imp: r/o UGIB    Rec:  ::Clear liquids for now  ::Continue trend HH and surveillance  ::PPI  ::Carafate QID  ::NPO p MN  ::EGD 3/7 afternoon

## 2023-03-06 NOTE — ED PROVIDER NOTE - DIFFERENTIAL DIAGNOSIS
Epigastric pain/nausea/vomiting - ?gastritis/PUD, ?pancreatitis, ?biliary dz, ?infectious, ?SBO (less likely given (+) BMs, no hx abd surgery) Differential Diagnosis

## 2023-03-06 NOTE — ED PROVIDER NOTE - CARE PLAN
Principal Discharge DX:	Abdominal pain   1 Principal Discharge DX:	Melena  Secondary Diagnosis:	Duodenitis

## 2023-03-06 NOTE — CONSULT NOTE ADULT - NS ATTEND AMEND GEN_ALL_CORE FT
78 year old woman with prior PUD, now with epigastric pain and melena.     Stable GI bleeding.   Two large bore IV's.   EGD booked.   IV PPI.

## 2023-03-06 NOTE — CONSULT NOTE ADULT - SUBJECTIVE AND OBJECTIVE BOX
Patient is a 78y old  Female who presents with a chief complaint of abdominal pain, melena    HPI:  This is a 78 year old female with history hypertension, duodenal ulcer/UGIB 1/2019 presenting with 7 day history of epigastric pain. Per patient, pain upper right quadrant radiating across upper abdomen aggravated by attempting to eat. Last ate two bites applesauce yesterday. Pain currently alleviated by pain medication. Reports normal brown formed stools over past 5 days, but now with black stools past 48 hours. Episodes x 2. Denies clots in stool. Denies hematemesis but does report several episodes of vomiting "just looks like water." Denies coffee ground emesis. Denies chest pain, shortness of breath, dizziness.  Denies taking NSAIDS, aspirin, Plavix, Brilinta, warfarin, Xarelto, or Eliquis. This has happened before. 2019, had similar symptoms, per patient and at that time hospitalized for UGIB with endoscopy. Endoscopy 1/2019 with multiple large cratered duodenal ulcers, non bleeding. Patient denies taking PPI on daily basis. has not had EGD/colon since 2019. In ED, Hgb 13, stable without any further episodes of melena or vomiting.        PAST MEDICAL & SURGICAL HISTORY:  HTN (hypertension)    GI bleeding    History of hysterectomy    MEDICATIONS  (STANDING):  pantoprazole Infusion 8 mG/Hr (10 mL/Hr) IV Continuous <Continuous>  sodium chloride 0.9%. 1000 milliLiter(s) (125 mL/Hr) IV Continuous <Continuous>    MEDICATIONS  (PRN):      Allergies    codeine (Hives)    Intolerances    SOCIAL HISTORY:    FAMILY HISTORY:  No pertinent family history in first degree relatives    REVIEW OF SYSTEMS:    CONSTITUTIONAL: No weakness, fevers or chills  EYES/ENT: No visual changes;  No vertigo or throat pain   NECK: No pain or stiffness  RESPIRATORY: No cough, wheezing, hemoptysis; No shortness of breath  CARDIOVASCULAR: No chest pain or palpitations  GASTROINTESTINAL: See HPI  GENITOURINARY: No dysuria, frequency or hematuria  NEUROLOGICAL: No numbness or weakness  SKIN: No itching, burning, rashes, or lesions   PSYCH: Normal mood and affect  All other review of systems is negative unless indicated above.    Vital Signs Last 24 Hrs  T(C): 36.4 (06 Mar 2023 12:15), Max: 36.9 (06 Mar 2023 06:26)  T(F): 97.6 (06 Mar 2023 12:15), Max: 98.4 (06 Mar 2023 06:26)  HR: 65 (06 Mar 2023 12:15) (64 - 65)  BP: 141/73 (06 Mar 2023 12:15) (141/73 - 151/63)  BP(mean): 94 (06 Mar 2023 12:15) (90 - 94)  RR: 16 (06 Mar 2023 12:15) (16 - 18)  SpO2: 99% (06 Mar 2023 12:15) (99% - 99%)    Parameters below as of 06 Mar 2023 12:15  Patient On (Oxygen Delivery Method): room air        PHYSICAL EXAM:    Constitutional: No acute distress, well-developed, non-toxic appearing  HEENT: masked, good phonation, not icteric  Neck: supple, no lymphadenopathy  Respiratory: clear to ascultation bilaterally, no wheezing  Cardiovascular: S1 and S2, regular rate and rhythm, no murmurs rubs or gallops  Gastrointestinal: soft, mildly tender b/l UQ, non-distended, +bowel sounds, no rebound or guarding, no surgical scars, no drains  Extremities: No peripheral edema, no cyanosis or clubbing  Vascular: 2+ peripheral pulses, no venous stasis  Neurological: A/O x 3, no focal deficits, no asterixis  Psychiatric: Normal mood, normal affect  Skin: No rashes, not jaundiced    LABS:                        13.3   12.16 )-----------( 197      ( 06 Mar 2023 08:16 )             38.7     03-06    124<L>  |  95<L>  |  15  ----------------------------<  100<H>  4.2   |  23  |  0.93    Ca    8.6      06 Mar 2023 08:16    TPro  7.2  /  Alb  3.7  /  TBili  0.6  /  DBili  x   /  AST  87<H>  /  ALT  130<H>  /  AlkPhos  97  03-06    PT/INR - ( 06 Mar 2023 08:16 )   PT: 10.8 sec;   INR: 0.93 ratio         PTT - ( 06 Mar 2023 08:16 )  PTT:28.5 sec  LIVER FUNCTIONS - ( 06 Mar 2023 08:16 )  Alb: 3.7 g/dL / Pro: 7.2 gm/dL / ALK PHOS: 97 U/L / ALT: 130 U/L / AST: 87 U/L / GGT: x             RADIOLOGY & ADDITIONAL STUDIES:    IMPRESSION:  Duodenitis  proximal duodenal sweep. Cannot exclude underlying peptic   ulcer disease or occult lesion. Recommend endoscopic correlation.    Common duct dilatation. Correlate with LFTs if necessary, MRCP.    Colonic diverticulosis without acute diverticulitis. Patient is a 78y old  Female who presents with a chief complaint of abdominal pain, melena    78 year old woman with HTN, past UGIB from PUD, admitted iwth abdominal pain and melena.     Patient states that about a week ago began to have RUQ and epigastric pain. Pain is sharp, non radiating, 5/10 in intensity, with food aggravating and no alleviating factors other than time (and pain meds in hospital). Two days ago stools turned jet black. Denies hematochezia or hematemesis. Did have vomiting but no blood.  Denies coffee ground emesis. Denies chest pain, shortness of breath, dizziness.  Denies taking NSAIDS, aspirin, Plavix, Brilinta, warfarin, Xarelto, or Eliquis.  This is very similar in presentation to her prior UGIB with large ulcer in 2019.       PAST MEDICAL & SURGICAL HISTORY:  HTN (hypertension)    GI bleeding/PUD    History of hysterectomy    MEDICATIONS  (STANDING):  pantoprazole Infusion 8 mG/Hr (10 mL/Hr) IV Continuous <Continuous>  sodium chloride 0.9%. 1000 milliLiter(s) (125 mL/Hr) IV Continuous <Continuous>    MEDICATIONS  (PRN):    Allergies    codeine (Hives)    Intolerances    SOCIAL HISTORY:  no smoking, drinking or drugs    FAMILY HISTORY:  No pertinent family history in first degree relatives  no colon or stomach cancer  no PUD    REVIEW OF SYSTEMS:    CONSTITUTIONAL: No weakness, fevers or chills  EYES/ENT: No visual changes;  No vertigo or throat pain   NECK: No pain or stiffness  RESPIRATORY: No cough, wheezing, hemoptysis; No shortness of breath  CARDIOVASCULAR: No chest pain or palpitations  GASTROINTESTINAL: See HPI  GENITOURINARY: No dysuria, frequency or hematuria  NEUROLOGICAL: No numbness or weakness  SKIN: No itching, burning, rashes, or lesions   PSYCH: Normal mood and affect  All other review of systems is negative unless indicated above.    Vital Signs Last 24 Hrs  T(C): 36.4 (06 Mar 2023 12:15), Max: 36.9 (06 Mar 2023 06:26)  T(F): 97.6 (06 Mar 2023 12:15), Max: 98.4 (06 Mar 2023 06:26)  HR: 65 (06 Mar 2023 12:15) (64 - 65)  BP: 141/73 (06 Mar 2023 12:15) (141/73 - 151/63)  BP(mean): 94 (06 Mar 2023 12:15) (90 - 94)  RR: 16 (06 Mar 2023 12:15) (16 - 18)  SpO2: 99% (06 Mar 2023 12:15) (99% - 99%)    Parameters below as of 06 Mar 2023 12:15  Patient On (Oxygen Delivery Method): room air        PHYSICAL EXAM:    Constitutional: No acute distress, well-developed, non-toxic appearing  HEENT: masked, good phonation, not icteric  Neck: supple, no lymphadenopathy  Respiratory: clear to ascultation bilaterally, no wheezing  Cardiovascular: S1 and S2, regular rate and rhythm, no murmurs rubs or gallops  Gastrointestinal: soft, mildly tender b/l UQ, non-distended, +bowel sounds, no rebound or guarding, no surgical scars, no drains  Extremities: No peripheral edema, no cyanosis or clubbing  Vascular: 2+ peripheral pulses, no venous stasis  Neurological: A/O x 3, no focal deficits, no asterixis  Psychiatric: Normal mood, normal affect  Skin: No rashes, not jaundiced    LABS:                        13.3   12.16 )-----------( 197      ( 06 Mar 2023 08:16 )             38.7     03-06    124<L>  |  95<L>  |  15  ----------------------------<  100<H>  4.2   |  23  |  0.93    Ca    8.6      06 Mar 2023 08:16    TPro  7.2  /  Alb  3.7  /  TBili  0.6  /  DBili  x   /  AST  87<H>  /  ALT  130<H>  /  AlkPhos  97  03-06    PT/INR - ( 06 Mar 2023 08:16 )   PT: 10.8 sec;   INR: 0.93 ratio         PTT - ( 06 Mar 2023 08:16 )  PTT:28.5 sec  LIVER FUNCTIONS - ( 06 Mar 2023 08:16 )  Alb: 3.7 g/dL / Pro: 7.2 gm/dL / ALK PHOS: 97 U/L / ALT: 130 U/L / AST: 87 U/L / GGT: x             RADIOLOGY & ADDITIONAL STUDIES:    IMPRESSION:  Duodenitis  proximal duodenal sweep. Cannot exclude underlying peptic   ulcer disease or occult lesion. Recommend endoscopic correlation.    Common duct dilatation. Correlate with LFTs if necessary, MRCP.    Colonic diverticulosis without acute diverticulitis.

## 2023-03-06 NOTE — ED PROVIDER NOTE - OBJECTIVE STATEMENT
78F hx "bleeding ulcers" here with ~1wk of generalized abdominal pain, n/v, "I can't eat anything".  No fever/chills. Symptoms similar to prior episode of GI bleeding. No hematemesis, ?small amt of blood in stool this am.

## 2023-03-06 NOTE — ED ADULT NURSE REASSESSMENT NOTE - NS ED NURSE REASSESS COMMENT FT1
handoff report taken from IHRAL Topete. pt. noted resting comfortably in stretcher. No distress noted, denies pain, safety maintained.

## 2023-03-06 NOTE — H&P ADULT - HISTORY OF PRESENT ILLNESS
77yo/F with PMH HTN, hyperlipidemia, h/o duodenal ulcers presented with epigastric to RUQ abd discomfort associated with nausea and inability to eat other than clears. No bloody vomiting.

## 2023-03-06 NOTE — ED PROVIDER NOTE - PROGRESS NOTE DETAILS
Marichuy Leslie for attending Dr. Ward: Rectal exam: [address]. Expiration 6/30/23. Dark stool. Guaiac positive. QC passed.

## 2023-03-07 ENCOUNTER — TRANSCRIPTION ENCOUNTER (OUTPATIENT)
Age: 78
End: 2023-03-07

## 2023-03-07 ENCOUNTER — RESULT REVIEW (OUTPATIENT)
Age: 78
End: 2023-03-07

## 2023-03-07 VITALS
DIASTOLIC BLOOD PRESSURE: 51 MMHG | HEART RATE: 86 BPM | TEMPERATURE: 98 F | OXYGEN SATURATION: 100 % | SYSTOLIC BLOOD PRESSURE: 139 MMHG | RESPIRATION RATE: 17 BRPM

## 2023-03-07 LAB
ANION GAP SERPL CALC-SCNC: 7 MMOL/L — SIGNIFICANT CHANGE UP (ref 5–17)
BUN SERPL-MCNC: 10 MG/DL — SIGNIFICANT CHANGE UP (ref 7–23)
CALCIUM SERPL-MCNC: 7.8 MG/DL — LOW (ref 8.5–10.1)
CHLORIDE SERPL-SCNC: 104 MMOL/L — SIGNIFICANT CHANGE UP (ref 96–108)
CO2 SERPL-SCNC: 21 MMOL/L — LOW (ref 22–31)
CREAT SERPL-MCNC: 0.93 MG/DL — SIGNIFICANT CHANGE UP (ref 0.5–1.3)
EGFR: 63 ML/MIN/1.73M2 — SIGNIFICANT CHANGE UP
GLUCOSE SERPL-MCNC: 91 MG/DL — SIGNIFICANT CHANGE UP (ref 70–99)
HCT VFR BLD CALC: 36.5 % — SIGNIFICANT CHANGE UP (ref 34.5–45)
HGB BLD-MCNC: 12.1 G/DL — SIGNIFICANT CHANGE UP (ref 11.5–15.5)
MCHC RBC-ENTMCNC: 33.2 GM/DL — SIGNIFICANT CHANGE UP (ref 32–36)
MCHC RBC-ENTMCNC: 35.3 PG — HIGH (ref 27–34)
MCV RBC AUTO: 106.4 FL — HIGH (ref 80–100)
PLATELET # BLD AUTO: 167 K/UL — SIGNIFICANT CHANGE UP (ref 150–400)
POTASSIUM SERPL-MCNC: 3.8 MMOL/L — SIGNIFICANT CHANGE UP (ref 3.5–5.3)
POTASSIUM SERPL-SCNC: 3.8 MMOL/L — SIGNIFICANT CHANGE UP (ref 3.5–5.3)
RBC # BLD: 3.43 M/UL — LOW (ref 3.8–5.2)
RBC # FLD: 12.4 % — SIGNIFICANT CHANGE UP (ref 10.3–14.5)
SODIUM SERPL-SCNC: 132 MMOL/L — LOW (ref 135–145)
WBC # BLD: 7.98 K/UL — SIGNIFICANT CHANGE UP (ref 3.8–10.5)
WBC # FLD AUTO: 7.98 K/UL — SIGNIFICANT CHANGE UP (ref 3.8–10.5)

## 2023-03-07 PROCEDURE — 99238 HOSP IP/OBS DSCHRG MGMT 30/<: CPT

## 2023-03-07 PROCEDURE — 88305 TISSUE EXAM BY PATHOLOGIST: CPT | Mod: 26

## 2023-03-07 PROCEDURE — 88313 SPECIAL STAINS GROUP 2: CPT | Mod: 26

## 2023-03-07 PROCEDURE — 43239 EGD BIOPSY SINGLE/MULTIPLE: CPT | Mod: GC

## 2023-03-07 PROCEDURE — 88312 SPECIAL STAINS GROUP 1: CPT | Mod: 26

## 2023-03-07 PROCEDURE — 88342 IMHCHEM/IMCYTCHM 1ST ANTB: CPT | Mod: 26

## 2023-03-07 RX ORDER — PANTOPRAZOLE SODIUM 20 MG/1
1 TABLET, DELAYED RELEASE ORAL
Qty: 60 | Refills: 0
Start: 2023-03-07 | End: 2023-04-05

## 2023-03-07 RX ORDER — SUCRALFATE 1 G
1 TABLET ORAL
Qty: 56 | Refills: 0
Start: 2023-03-07 | End: 2023-03-20

## 2023-03-07 RX ORDER — PANTOPRAZOLE SODIUM 20 MG/1
1 TABLET, DELAYED RELEASE ORAL
Qty: 30 | Refills: 0
Start: 2023-03-07 | End: 2023-04-05

## 2023-03-07 RX ORDER — SUCRALFATE 1 G
1 TABLET ORAL
Refills: 0 | Status: DISCONTINUED | OUTPATIENT
Start: 2023-03-07 | End: 2023-03-07

## 2023-03-07 RX ADMIN — SODIUM CHLORIDE 125 MILLILITER(S): 9 INJECTION INTRAMUSCULAR; INTRAVENOUS; SUBCUTANEOUS at 09:50

## 2023-03-07 RX ADMIN — Medication 100 MILLIGRAM(S): at 09:46

## 2023-03-07 RX ADMIN — PANTOPRAZOLE SODIUM 10 MG/HR: 20 TABLET, DELAYED RELEASE ORAL at 10:24

## 2023-03-07 RX ADMIN — SODIUM CHLORIDE 125 MILLILITER(S): 9 INJECTION INTRAMUSCULAR; INTRAVENOUS; SUBCUTANEOUS at 00:29

## 2023-03-07 NOTE — DISCHARGE NOTE PROVIDER - NSDCFUSCHEDAPPT_GEN_ALL_CORE_FT
[facility]  [address] [address] [facility]  Scheduled Appointment: 03/27/2023

## 2023-03-07 NOTE — DISCHARGE NOTE NURSING/CASE MANAGEMENT/SOCIAL WORK - NSDCPEFALRISK_GEN_ALL_CORE
For information on Fall & Injury Prevention, visit: [email or web address] OR  [email or web address] OR  https://www.cdc.gov/steadi/patient.html

## 2023-03-07 NOTE — DISCHARGE NOTE NURSING/CASE MANAGEMENT/SOCIAL WORK - NSDCVIVACCINE_GEN_ALL_CORE_FT
Tdap; 23-Jun-2019 23:03; Dao Avila (HIRAL); [employer]; [id number] (Exp. Date: 26-Feb-2021); IntraMuscular; Deltoid Left.; 0.5 milliLiter(s); VIS (VIS Published: 09-May-2013, VIS Presented: 23-Jun-2019);

## 2023-03-07 NOTE — DISCHARGE NOTE PROVIDER - HOSPITAL COURSE
79 y/o female w/ pmhx of duodeonal ulcer, HTN, HLD presenting from home for right upper abd pain, epigastric pain and decreased PO intake (only to tolerate liquids) for past severe days. not associated with vomiting or diarrhea. pt was placed on protonix, and carafate QID , was evaled by GI Dr. Shaffer, had Upper endoscopy - operative findings consistent with peptic ulcer diease Non-bleeding gastric ulcer with no stigmata of bleeding. Biopsied. One non-bleeding cratered duodenal ulcer with no stigmata of bleeding. pt also with HypoNa- due to hypovolemia- given NS infusion in ED with improvement, pt tolerating PO intake at this time.  pt will be dc on PO carafate, PO protonix and follow up with Dr. Shaffer in 2 months for repeat endo. no other complications during admission. pt tolerated procedure well.       All 10 systems reviewed and found to be negative with the exception of what has been described above.    Vital Signs Last 24 Hrs  T(C): 36.7 (07 Mar 2023 06:54), Max: 36.7 (06 Mar 2023 21:36)  T(F): 98 (07 Mar 2023 06:54), Max: 98 (06 Mar 2023 21:36)  HR: 76 (07 Mar 2023 06:54) (66 - 76)  BP: 141/55 (07 Mar 2023 06:54) (137/64 - 142/58)  BP(mean): --  RR: 17 (07 Mar 2023 06:54) (16 - 17)  SpO2: 99% (07 Mar 2023 06:54) (94% - 100%)    Parameters below as of 07 Mar 2023 06:54  Patient On (Oxygen Delivery Method): room air                              12.1   7.98  )-----------( 167      ( 07 Mar 2023 08:55 )             36.5     03-07    132<L>  |  104  |  10  ----------------------------<  91  3.8   |  21<L>  |  0.93    Ca    7.8<L>      07 Mar 2023 08:55    TPro  7.2  /  Alb  3.7  /  TBili  0.6  /  DBili  x   /  AST  87<H>  /  ALT  130<H>  /  AlkPhos  97  03-06        LIVER FUNCTIONS - ( 06 Mar 2023 08:16 )  Alb: 3.7 g/dL / Pro: 7.2 gm/dL / ALK PHOS: 97 U/L / ALT: 130 U/L / AST: 87 U/L / GGT: x           PT/INR - ( 06 Mar 2023 08:16 )   PT: 10.8 sec;   INR: 0.93 ratio         PTT - ( 06 Mar 2023 08:16 )  PTT:28.5 sec  Urinalysis Basic - ( 06 Mar 2023 07:16 )    Color: Yellow / Appearance: Clear / S.015 / pH: x  Gluc: x / Ketone: Small  / Bili: Negative / Urobili: Negative   Blood: x / Protein: 30 mg/dL / Nitrite: Negative   Leuk Esterase: Trace / RBC: 0-2 /HPF / WBC 6-10 /HPF   Sq Epi: x / Non Sq Epi: Few / Bacteria: Occasional       Physical Exam:  · Constitutional	well-groomed  · Eyes	PERRL; EOMI  · ENMT	no gross abnormalities  · Respiratory	clear to auscultation bilaterally  · Cardiovascular	regular rate and rhythm; S1 S2 present; no gallops; no rub; no murmur  · Gastrointestinal	soft; normal active bowel sounds  · Gastrointestinal Comments	some epigastric sensitivity  · Genitourinary Female	normal external genitalia  · Neurological	cranial nerves II-XII intact; sensation intact  · Skin	warm and dry  · Musculoskeletal	normal gait  · Psychiatric	normal affect        #Epigastric pain likely duodenal ulcer   GI eval noted  s/p protonix infusion, switched to Protonix bid   H&H stable   EGD with peptic ulcer disease   can dc home if tolerating po intake    #HTN- cont metoprolol    #Hyperlipidemia- statin, resume on discharge    #Hyponatremia --- improving     #DVt proph- ambulate    #Dispo- dc home with follow up Dr. Shaffer in 2 months     spent _49___ mins preparing dc.   above plan discussed with pt,  bedside RN, and MD Valencia                  79 y/o female w/ pmhx of duodeonal ulcer, HTN, HLD presenting from home for right upper abd pain, epigastric pain and decreased PO intake (only to tolerate liquids) for past severe days. not associated with vomiting or diarrhea. pt was placed on protonix, and carafate QID , was evaled by GI Dr. Shaffer, had Upper endoscopy - operative findings consistent with peptic ulcer diease Non-bleeding gastric ulcer with no stigmata of bleeding. Biopsied. One non-bleeding cratered duodenal ulcer with no stigmata of bleeding. pt also with HypoNa- due to hypovolemia- given NS infusion in ED with improvement, pt tolerating PO intake at this time.  pt will be dc on PO carafate, PO protonix and follow up with Dr. Shaffer in 2 months for repeat endo. no other complications during admission. pt tolerated procedure well.       All 10 systems reviewed and found to be negative with the exception of what has been described above.    Vital Signs Last 24 Hrs  T(C): 36.7 (07 Mar 2023 06:54), Max: 36.7 (06 Mar 2023 21:36)  T(F): 98 (07 Mar 2023 06:54), Max: 98 (06 Mar 2023 21:36)  HR: 76 (07 Mar 2023 06:54) (66 - 76)  BP: 141/55 (07 Mar 2023 06:54) (137/64 - 142/58)  BP(mean): --  RR: 17 (07 Mar 2023 06:54) (16 - 17)  SpO2: 99% (07 Mar 2023 06:54) (94% - 100%)    Parameters below as of 07 Mar 2023 06:54  Patient On (Oxygen Delivery Method): room air                              12.1   7.98  )-----------( 167      ( 07 Mar 2023 08:55 )             36.5     03-07    132<L>  |  104  |  10  ----------------------------<  91  3.8   |  21<L>  |  0.93    Ca    7.8<L>      07 Mar 2023 08:55    TPro  7.2  /  Alb  3.7  /  TBili  0.6  /  DBili  x   /  AST  87<H>  /  ALT  130<H>  /  AlkPhos  97  03-06        LIVER FUNCTIONS - ( 06 Mar 2023 08:16 )  Alb: 3.7 g/dL / Pro: 7.2 gm/dL / ALK PHOS: 97 U/L / ALT: 130 U/L / AST: 87 U/L / GGT: x           PT/INR - ( 06 Mar 2023 08:16 )   PT: 10.8 sec;   INR: 0.93 ratio          Physical Exam:  · Constitutional	well-groomed  · Eyes	PERRL; EOMI  · ENMT	no gross abnormalities  · Respiratory	clear to auscultation bilaterally  · Cardiovascular	regular rate and rhythm; S1 S2 present; no gallops; no rub; no murmur  · Gastrointestinal	soft; normal active bowel sounds  · Gastrointestinal Comments	some epigastric sensitivity  · Genitourinary Female	normal external genitalia  · Neurological	cranial nerves II-XII intact; sensation intact  · Skin	warm and dry  · Musculoskeletal	normal gait  · Psychiatric	normal affect        #Epigastric pain likely duodenal ulcer   GI eval noted  s/p protonix infusion, switched to Protonix bid   H&H stable   EGD with peptic ulcer disease   can dc home if tolerating po intake    #HTN- cont metoprolol    #Hyperlipidemia- statin, resume on discharge    #Hyponatremia --- improving     #DVt proph- ambulate    #Dispo- dc home with follow up Dr. Shaffer in 2 months     spent _49___ mins preparing dc.   above plan discussed with pt,  bedside RN, and MD Valencia     Attending note: Patient seen and examined with LALO Witt  Case reviewed and discussed with her and necessary changes were made  Agree with her assessment and d/c plan.

## 2023-03-07 NOTE — DISCHARGE NOTE NURSING/CASE MANAGEMENT/SOCIAL WORK - PATIENT PORTAL LINK FT
You can access the FollowMyHealth Patient Portal offered by [facility] by registering at the following website: [email or web address]. By joining [employer]’s FollowMyHealth portal, you will also be able to view your health information using other applications (apps) compatible with our system.

## 2023-03-07 NOTE — DISCHARGE NOTE PROVIDER - NSDCCPCAREPLAN_GEN_ALL_CORE_FT
PRINCIPAL DISCHARGE DIAGNOSIS  Diagnosis: Melena  Assessment and Plan of Treatment:   Gastrointestinal (GI) bleeding is bleeding somewhere along the path that food travels through the body (digestive tract). This path is anywhere between the mouth and the opening of the butt (anus). You may have blood in your poop (stool) or have black poop. If you throw up (vomit), there may be blood in it.  This condition can be mild, serious, or even life-threatening. If you have a lot of bleeding, you may need to stay in the hospital.  What are the causes?  This condition may be caused by:  •Irritation and swelling of the esophagus (esophagitis). The esophagus is part of the body that moves food from your mouth to your stomach.  •Swollen veins in the butt (hemorrhoids)  •Areas of painful tearing in the opening of the butt (anal fissures). These are often caused by passing hard poop.  •Pouches that form on the colon over time (diverticulosis).  •Irritation and swelling (diverticulitis) in areas where pouches have formed on the colon.  •Growths (polyps) or cancer. Colon cancer often starts out as growths that are not cancer.  •Irritation of the stomach lining (gastritis).  •Sores (ulcers) in the stomach  What increases the risk?  You are more likely to develop this condition if you:  •Have a certain type of infection in your stomach (Helicobacter pylori infection).  •Take certain medicines.  •Smoke.   •Drink alcohol.  **Monitor for the following signs/symptoms and contact your primary care provider if they occur or go to the Emergency Room if they are severe***  •Throwing up (vomiting) material that has bright red blood in it. It may look like coffee grounds.  •Changes in your poop. The poop may:  •Have red blood in it.   •Be black, look like tar, and smell stronger than normal.  •Be red.  •Pain or cramping in the belly (abdomen).   ~*~*~ Continue to take Protonix Twice a day an Carafate 4 times a day ~*~*~  ~*~*~* Follow up with Dr. Shaffer in [facility] nexr 2 months for re- evalaution~*~*~*      SECONDARY DISCHARGE DIAGNOSES  Diagnosis: Duodenitis  Assessment and Plan of Treatment:

## 2023-03-07 NOTE — DISCHARGE NOTE PROVIDER - NSDCMRMEDTOKEN_GEN_ALL_CORE_FT
acetaminophen 325 mg oral tablet: 2 tab(s) orally 2 times a day, As Needed  Caltrate 600 mg oral tablet: 2 tab(s) orally once a day  cycloSPORINE 0.05% ophthalmic emulsion: 1 drop(s) to each affected eye every 12 hours  metoprolol succinate 100 mg oral tablet, extended release: 1 tab(s) orally once a day (in the morning)  metoprolol succinate 50 mg oral tablet, extended release: 1 tab(s) orally once a day (in the evening)  olmesartan 40 mg oral tablet: 1 tab(s) orally once a day  pantoprazole 40 mg oral delayed release tablet: 1 tab(s) orally 2 times a day   rosuvastatin 10 mg oral tablet: 1 tab(s) orally once a day  sucralfate 1 g oral tablet: 1 tab(s) orally 4 times a day  Vitamin D3 25 mcg (1000 intl units) oral tablet: 4 tab(s) orally once a day  Zetia 10 mg oral tablet: 1 tab(s) orally once a day

## 2023-03-07 NOTE — DISCHARGE NOTE PROVIDER - NSDCCAREPROVSEEN_GEN_ALL_CORE_FT
Twan Valencia, Kya Avelar, Gia Witt, Meagan Choudhury, Darshan Henry, Jessica Shaffer, Stephan Watkins, Suk-Hyeon

## 2023-03-07 NOTE — DISCHARGE NOTE PROVIDER - CARE PROVIDER_API CALL
Stephan Shaffer)  Gastroenterology; Internal Medicine  [address] [place] [address]  Phone: [phone]  Fax: [phone]  Follow Up Time:

## 2023-03-07 NOTE — DISCHARGE NOTE PROVIDER - NSDCPNSUBOBJ_GEN_ALL_CORE
< from: Upper Endoscopy (03.07.23 @ 11:35) >      A mild Schatzki ring (acquired) was found at the GE junction. Biopsies    were taken with a cold forceps for histology.   There was a 2 cm hiatal hernia.   One non-bleeding cratered gastric ulcer with no stigmata of bleeding was    found in the gastric antrum. The lesion was 7 mm in largest dimension.    Biopsies were taken with a cold forceps for histology.   One non-bleeding cratered duodenal ulcer with no stigmata of bleeding    was found in the first portion of the duodenum. The lesion was 15 mm in    largest dimension.    - Mild Schatzki ring. Biopsied.  - Non-bleeding gastric ulcer with no stigmata of bleeding. Biopsied.   - One non-bleeding cratered duodenal ulcer with no stigmata of bleeding.  - Observe patient's clinical course.  - PPI BID.       - Ok to be discharged today.        < end of copied text >

## 2023-03-09 LAB — SURGICAL PATHOLOGY STUDY: SIGNIFICANT CHANGE UP

## 2023-03-13 DIAGNOSIS — Z87.891 PERSONAL HISTORY OF NICOTINE DEPENDENCE: ICD-10-CM

## 2023-03-13 DIAGNOSIS — K22.2 ESOPHAGEAL OBSTRUCTION: ICD-10-CM

## 2023-03-13 DIAGNOSIS — K21.9 GASTRO-ESOPHAGEAL REFLUX DISEASE WITHOUT ESOPHAGITIS: ICD-10-CM

## 2023-03-13 DIAGNOSIS — E78.5 HYPERLIPIDEMIA, UNSPECIFIED: ICD-10-CM

## 2023-03-13 DIAGNOSIS — K26.9 DUODENAL ULCER, UNSPECIFIED AS ACUTE OR CHRONIC, WITHOUT HEMORRHAGE OR PERFORATION: ICD-10-CM

## 2023-03-13 DIAGNOSIS — Z90.710 ACQUIRED ABSENCE OF BOTH CERVIX AND UTERUS: ICD-10-CM

## 2023-03-13 DIAGNOSIS — K92.1 MELENA: ICD-10-CM

## 2023-03-13 DIAGNOSIS — Z79.899 OTHER LONG TERM (CURRENT) DRUG THERAPY: ICD-10-CM

## 2023-03-13 DIAGNOSIS — R10.13 EPIGASTRIC PAIN: ICD-10-CM

## 2023-03-13 DIAGNOSIS — K44.9 DIAPHRAGMATIC HERNIA WITHOUT OBSTRUCTION OR GANGRENE: ICD-10-CM

## 2023-03-13 DIAGNOSIS — I10 ESSENTIAL (PRIMARY) HYPERTENSION: ICD-10-CM

## 2023-03-13 DIAGNOSIS — K29.50 UNSPECIFIED CHRONIC GASTRITIS WITHOUT BLEEDING: ICD-10-CM

## 2023-03-13 PROBLEM — Z87.19 PERSONAL HISTORY OF OTHER DISEASES OF THE DIGESTIVE SYSTEM: Chronic | Status: ACTIVE | Noted: 2023-03-06

## 2023-03-27 ENCOUNTER — APPOINTMENT (OUTPATIENT)
Dept: RHEUMATOLOGY | Facility: CLINIC | Age: 78
End: 2023-03-27
Payer: MEDICARE

## 2023-03-27 VITALS
DIASTOLIC BLOOD PRESSURE: 72 MMHG | SYSTOLIC BLOOD PRESSURE: 116 MMHG | BODY MASS INDEX: 24.41 KG/M2 | OXYGEN SATURATION: 98 % | TEMPERATURE: 97.6 F | HEART RATE: 68 BPM | WEIGHT: 125 LBS

## 2023-03-27 DIAGNOSIS — E55.9 VITAMIN D DEFICIENCY, UNSPECIFIED: ICD-10-CM

## 2023-03-27 PROCEDURE — 96372 THER/PROPH/DIAG INJ SC/IM: CPT

## 2023-03-27 RX ORDER — DENOSUMAB 60 MG/ML
60 INJECTION SUBCUTANEOUS
Qty: 1 | Refills: 0 | Status: COMPLETED | OUTPATIENT
Start: 2023-03-27

## 2023-03-27 RX ADMIN — DENOSUMAB 0 MG/ML: 60 INJECTION SUBCUTANEOUS at 00:00

## 2023-03-27 NOTE — ASSESSMENT
[FreeTextEntry1] : KATIE GONZALEZ is a 78 year old woman with long-standing osteopenia, who completed 5 years of Actonel in the past, off bisphosphonates x approx 15 years. Recurrent spontaneous spinal compression fractures x 3 since 2018, s/p vertebroplasty for one but still with spinal pain and limitation to mobility including prolonged standing and walking limiting functionality and increasing future fall risk. Hx of RACHEL s/p IV contrast in Jan and Cr 1.6 on recent labs with GFR 32. Despite DEXA report of osteopenia with over all fracture risk 18.2, and hip fracture 3.6, clinically this patient must be considered in the osteoporotic range given her multiple, spontaneous vertebral fractures without any other risk factors such as inflammatory arthritis, etc that could be modified at this time. Concern for fall risk given pain that limits ADLs and walking. She is not a candidate for Reclast given her renal function, and after extensive discussion about Forteo she declined as she does not feel comfortable with self-injection and does not have anyone else who would be able to administer this daily for her. Thus at this time, I feel that the best choice to maintain her bone density and avoid further fragility fractures would be Prolia. Has been tolerating well. \par \par # clinical OP \par - prolia administered today, tolerated well\par - c/w dietary Ca/Vit D supplementation \par - strongly encouraged weight bearing exercise, reviewed options and goal length of time with her \par - check Vit D with upcoming PMD labs \par - pt has seen dentist, no planned oral surgery\par - repeat DEXA in June 2023 \par \par # OA related mild arthralgias, not limiting ADLs\par - prn tylenol\par \par RTC in 6 months for next Prolia

## 2023-03-29 LAB — 25(OH)D3 SERPL-MCNC: 37.6 NG/ML

## 2023-04-12 ENCOUNTER — APPOINTMENT (OUTPATIENT)
Dept: GASTROENTEROLOGY | Facility: CLINIC | Age: 78
End: 2023-04-12
Payer: MEDICARE

## 2023-04-12 VITALS
HEIGHT: 60 IN | HEART RATE: 84 BPM | DIASTOLIC BLOOD PRESSURE: 87 MMHG | BODY MASS INDEX: 24.35 KG/M2 | WEIGHT: 124 LBS | SYSTOLIC BLOOD PRESSURE: 154 MMHG

## 2023-04-12 DIAGNOSIS — K26.9 DUODENAL ULCER, UNSPECIFIED AS ACUTE OR CHRONIC, W/OUT HEMORRHAGE OR PERFORATION: ICD-10-CM

## 2023-04-12 PROCEDURE — 99214 OFFICE O/P EST MOD 30 MIN: CPT

## 2023-04-12 RX ORDER — SUCRALFATE 1 G/1
1 TABLET ORAL
Qty: 120 | Refills: 2 | Status: ACTIVE | COMMUNITY
Start: 2023-04-12 | End: 1900-01-01

## 2023-04-12 NOTE — ASSESSMENT
[FreeTextEntry1] : 78 year old woman with recent hospitalization for abdominal pain and melena, found to have peptic ulcer disease, here for follow up. \par \par Plan for repeat upper endoscopy to ensure gastric ulcer healing, re-evaluate if persistent duodenal ulcer, and biopsies. \par Ordered (NP ordered today) pantoprazole 40mg orally twice daily. \par

## 2023-04-12 NOTE — HISTORY OF PRESENT ILLNESS
[FreeTextEntry1] : 78 year old woman with recent hospitalization for abdominal pain and melena, found to have peptic ulcer disease, here for follow up. \par \par Since discharge patient has felt well. No abdominal pain. No post prandial symptoms. No weight loss. Good appetite. No overt signs of GI bleeding like melena, hematochezia, hematemesis. Normal brown BM's, no change. She is taking her PPI as prescribed, but needs refills. \par

## 2023-04-12 NOTE — PHYSICAL EXAM
[Alert] : alert [Normal Voice/Communication] : normal voice/communication [Healthy Appearing] : healthy appearing [No Acute Distress] : no acute distress [Sclera] : the sclera and conjunctiva were normal [Hearing Threshold Finger Rub Not [place]] : hearing was normal [Normal Lips/Gums] : the lips and gums were normal [Oropharynx] : the oropharynx was normal [Normal Appearance] : the appearance of the neck was normal [No Neck Mass] : no neck mass was observed [Abdomen Tenderness] : non-tender [Abdomen Soft] : soft [Abnormal Walk] : normal gait [No Clubbing, Cyanosis] : no clubbing or cyanosis of the fingernails [Normal Color / Pigmentation] : normal skin color and pigmentation [] : no rash [Skin Lesions] : no skin lesions [Sensation] : the sensory exam was normal to light touch and pinprick [Motor Exam] : the motor exam was normal [Oriented To Time, Place, And Person] : oriented to person, place, and time [de-identified] : appropriate for age

## 2023-07-05 ENCOUNTER — LABORATORY RESULT (OUTPATIENT)
Age: 78
End: 2023-07-05

## 2023-07-07 ENCOUNTER — OUTPATIENT (OUTPATIENT)
Dept: OUTPATIENT SERVICES | Facility: HOSPITAL | Age: 78
LOS: 1 days | Discharge: HOME | End: 2023-07-07
Payer: MEDICARE

## 2023-07-07 ENCOUNTER — APPOINTMENT (OUTPATIENT)
Dept: GASTROENTEROLOGY | Facility: HOSPITAL | Age: 78
End: 2023-07-07
Payer: MEDICARE

## 2023-07-07 ENCOUNTER — RESULT REVIEW (OUTPATIENT)
Age: 78
End: 2023-07-07

## 2023-07-07 VITALS
SYSTOLIC BLOOD PRESSURE: 176 MMHG | DIASTOLIC BLOOD PRESSURE: 85 MMHG | RESPIRATION RATE: 23 BRPM | HEIGHT: 61 IN | HEART RATE: 74 BPM | OXYGEN SATURATION: 99 % | TEMPERATURE: 98 F | WEIGHT: 125 LBS

## 2023-07-07 DIAGNOSIS — Z98.890 OTHER SPECIFIED POSTPROCEDURAL STATES: Chronic | ICD-10-CM

## 2023-07-07 DIAGNOSIS — K21.9 GASTRO-ESOPHAGEAL REFLUX DISEASE WITHOUT ESOPHAGITIS: ICD-10-CM

## 2023-07-07 DIAGNOSIS — Z90.710 ACQUIRED ABSENCE OF BOTH CERVIX AND UTERUS: Chronic | ICD-10-CM

## 2023-07-07 PROCEDURE — 88312 SPECIAL STAINS GROUP 1: CPT

## 2023-07-07 PROCEDURE — 43239 EGD BIOPSY SINGLE/MULTIPLE: CPT | Mod: GC

## 2023-07-07 PROCEDURE — 88312 SPECIAL STAINS GROUP 1: CPT | Mod: 26

## 2023-07-07 PROCEDURE — 88305 TISSUE EXAM BY PATHOLOGIST: CPT | Mod: 26

## 2023-07-07 PROCEDURE — 88305 TISSUE EXAM BY PATHOLOGIST: CPT

## 2023-07-07 RX ORDER — EZETIMIBE 10 MG/1
1 TABLET ORAL
Qty: 0 | Refills: 0 | DISCHARGE

## 2023-07-07 RX ORDER — METOPROLOL TARTRATE 50 MG
1 TABLET ORAL
Qty: 0 | Refills: 0 | DISCHARGE

## 2023-07-07 RX ORDER — CHOLECALCIFEROL (VITAMIN D3) 125 MCG
4 CAPSULE ORAL
Qty: 0 | Refills: 0 | DISCHARGE

## 2023-07-07 RX ORDER — CYCLOSPORINE 0.5 MG/ML
1 EMULSION OPHTHALMIC
Qty: 0 | Refills: 0 | DISCHARGE

## 2023-07-07 RX ORDER — ACETAMINOPHEN 500 MG
2 TABLET ORAL
Qty: 0 | Refills: 0 | DISCHARGE

## 2023-07-07 RX ORDER — ROSUVASTATIN CALCIUM 5 MG/1
1 TABLET ORAL
Qty: 0 | Refills: 0 | DISCHARGE

## 2023-07-07 RX ORDER — OLMESARTAN MEDOXOMIL 5 MG/1
1 TABLET, FILM COATED ORAL
Qty: 0 | Refills: 0 | DISCHARGE

## 2023-07-07 RX ORDER — CALCIUM CARBONATE 500(1250)
2 TABLET ORAL
Qty: 0 | Refills: 0 | DISCHARGE

## 2023-07-07 NOTE — ASU PATIENT PROFILE, ADULT - TEACHING/LEARNING RELIGIOUS CONSIDERATIONS
Problem: CARDIOVASCULAR - ADULT  Goal: Maintains optimal cardiac output and hemodynamic stability  Description  INTERVENTIONS:  - Monitor I/O, vital signs and rhythm  - Monitor for S/S and trends of decreased cardiac output i e  bleeding, hypotension  - Administer and titrate ordered vasoactive medications to optimize hemodynamic stability  - Assess quality of pulses, skin color and temperature  - Assess for signs of decreased coronary artery perfusion - ex   Angina  - Instruct patient to report change in severity of symptoms  Outcome: Progressing     Problem: GASTROINTESTINAL - ADULT  Goal: Minimal or absence of nausea and/or vomiting  Description  INTERVENTIONS:  - Administer IV fluids as ordered to ensure adequate hydration  - Administer ordered antiemetic medications as needed  - Provide nonpharmacologic comfort measures as appropriate  - Advance diet as tolerated, if ordered  - Nutrition services referral to assist patient with adequate nutrition and appropriate food choices   Outcome: Progressing  Goal: Maintains adequate nutritional intake  Description  INTERVENTIONS:  - Monitor percentage of each meal consumed  - Identify factors contributing to decreased intake, treat as appropriate  - Assist with meals as needed  - Monitor I&O, WT and lab values  - Obtain nutrition services referral as needed  Outcome: Progressing     Problem: PAIN - ADULT  Goal: Verbalizes/displays adequate comfort level or baseline comfort level  Description  Interventions:  - Encourage patient to monitor pain and request assistance  - Assess pain using appropriate pain scale  - Administer analgesics based on type and severity of pain and evaluate response  - Implement non-pharmacological measures as appropriate and evaluate response  - Consider cultural and social influences on pain and pain management  - Notify physician/advanced practitioner if interventions unsuccessful or patient reports new pain  Outcome: Progressing     Problem: DISCHARGE PLANNING  Goal: Discharge to home or other facility with appropriate resources  Description  INTERVENTIONS:  - Identify barriers to discharge w/patient and caregiver  - Arrange for needed discharge resources and transportation as appropriate  - Identify discharge learning needs (meds, wound care, etc )  - Refer to Case Management Department for coordinating discharge planning if the patient needs post-hospital services based on physician/advanced practitioner order or complex needs related to functional status, cognitive ability, or social support system   Outcome: Progressing none

## 2023-07-07 NOTE — ASU PATIENT PROFILE, ADULT - NSICDXPASTMEDICALHX_GEN_ALL_CORE_FT
PAST MEDICAL HISTORY:  History of duodenal ulcer     HTN (hypertension)      PAST MEDICAL HISTORY:  History of duodenal ulcer     History of high cholesterol     HTN (hypertension)     Lumbar compression fracture

## 2023-07-07 NOTE — ASU PATIENT PROFILE, ADULT - NSICDXPASTSURGICALHX_GEN_ALL_CORE_FT
PAST SURGICAL HISTORY:  History of hysterectomy      PAST SURGICAL HISTORY:  H/O laminectomy     History of D&C multiple    History of hysterectomy

## 2023-07-07 NOTE — ASU PATIENT PROFILE, ADULT - FALL HARM RISK - HARM RISK INTERVENTIONS

## 2023-07-10 LAB — SURGICAL PATHOLOGY STUDY: SIGNIFICANT CHANGE UP

## 2023-07-11 RX ORDER — PANTOPRAZOLE 40 MG/1
40 TABLET, DELAYED RELEASE ORAL
Qty: 90 | Refills: 3 | Status: ACTIVE | COMMUNITY
Start: 2023-07-11 | End: 1900-01-01

## 2023-07-12 DIAGNOSIS — K21.9 GASTRO-ESOPHAGEAL REFLUX DISEASE WITHOUT ESOPHAGITIS: ICD-10-CM

## 2023-07-12 DIAGNOSIS — K29.80 DUODENITIS WITHOUT BLEEDING: ICD-10-CM

## 2023-07-12 DIAGNOSIS — K27.9 PEPTIC ULCER, SITE UNSPECIFIED, UNSPECIFIED AS ACUTE OR CHRONIC, WITHOUT HEMORRHAGE OR PERFORATION: ICD-10-CM

## 2023-07-12 DIAGNOSIS — Z87.891 PERSONAL HISTORY OF NICOTINE DEPENDENCE: ICD-10-CM

## 2023-07-12 DIAGNOSIS — I10 ESSENTIAL (PRIMARY) HYPERTENSION: ICD-10-CM

## 2023-07-12 DIAGNOSIS — Z88.5 ALLERGY STATUS TO NARCOTIC AGENT: ICD-10-CM

## 2023-07-12 DIAGNOSIS — Z90.710 ACQUIRED ABSENCE OF BOTH CERVIX AND UTERUS: ICD-10-CM

## 2023-07-12 DIAGNOSIS — M81.0 AGE-RELATED OSTEOPOROSIS WITHOUT CURRENT PATHOLOGICAL FRACTURE: ICD-10-CM

## 2023-10-16 ENCOUNTER — APPOINTMENT (OUTPATIENT)
Dept: RHEUMATOLOGY | Facility: CLINIC | Age: 78
End: 2023-10-16
Payer: MEDICARE

## 2023-10-16 VITALS
HEART RATE: 71 BPM | DIASTOLIC BLOOD PRESSURE: 60 MMHG | OXYGEN SATURATION: 96 % | HEIGHT: 61 IN | WEIGHT: 125 LBS | TEMPERATURE: 97.2 F | BODY MASS INDEX: 23.6 KG/M2 | SYSTOLIC BLOOD PRESSURE: 100 MMHG

## 2023-10-16 DIAGNOSIS — M80.00XS AGE-RELATED OSTEOPOROSIS WITH CURRENT PATHOLOGICAL FRACTURE, UNSPECIFIED SITE, SEQUELA: ICD-10-CM

## 2023-10-16 DIAGNOSIS — M15.9 POLYOSTEOARTHRITIS, UNSPECIFIED: ICD-10-CM

## 2023-10-16 PROBLEM — Z86.39 PERSONAL HISTORY OF OTHER ENDOCRINE, NUTRITIONAL AND METABOLIC DISEASE: Chronic | Status: ACTIVE | Noted: 2023-07-07

## 2023-10-16 PROBLEM — S32.000A WEDGE COMPRESSION FRACTURE OF UNSPECIFIED LUMBAR VERTEBRA, INITIAL ENCOUNTER FOR CLOSED FRACTURE: Chronic | Status: ACTIVE | Noted: 2023-07-07

## 2023-10-16 PROCEDURE — 96372 THER/PROPH/DIAG INJ SC/IM: CPT

## 2023-10-16 PROCEDURE — 99213 OFFICE O/P EST LOW 20 MIN: CPT | Mod: 25

## 2023-10-16 RX ORDER — DENOSUMAB 60 MG/ML
60 INJECTION SUBCUTANEOUS
Qty: 1 | Refills: 0 | Status: COMPLETED | OUTPATIENT
Start: 2023-10-16

## 2023-10-16 RX ADMIN — DENOSUMAB 0 MG/ML: 60 INJECTION SUBCUTANEOUS at 00:00

## 2024-03-20 LAB
25(OH)D3 SERPL-MCNC: 54.9 NG/ML
ALBUMIN SERPL ELPH-MCNC: 4.4 G/DL
ALP BLD-CCNC: 95 U/L
ALT SERPL-CCNC: 58 U/L
ANION GAP SERPL CALC-SCNC: 14 MMOL/L
AST SERPL-CCNC: 64 U/L
BILIRUB SERPL-MCNC: 0.6 MG/DL
BUN SERPL-MCNC: 14 MG/DL
CALCIUM SERPL-MCNC: 9.1 MG/DL
CHLORIDE SERPL-SCNC: 101 MMOL/L
CO2 SERPL-SCNC: 22 MMOL/L
CREAT SERPL-MCNC: 1.06 MG/DL
EGFR: 54 ML/MIN/1.73M2
GLUCOSE SERPL-MCNC: 97 MG/DL
POTASSIUM SERPL-SCNC: 4.5 MMOL/L
PROT SERPL-MCNC: 7.1 G/DL
SODIUM SERPL-SCNC: 137 MMOL/L

## 2024-04-25 ENCOUNTER — RX RENEWAL (OUTPATIENT)
Age: 79
End: 2024-04-25

## 2024-04-25 RX ORDER — PANTOPRAZOLE 40 MG/1
40 TABLET, DELAYED RELEASE ORAL
Qty: 180 | Refills: 3 | Status: ACTIVE | COMMUNITY
Start: 2023-04-12 | End: 1900-01-01

## 2024-05-17 RX ORDER — DENOSUMAB 60 MG/ML
60 INJECTION SUBCUTANEOUS
Qty: 1 | Refills: 0 | Status: ACTIVE | COMMUNITY
Start: 2024-05-17 | End: 1900-01-01

## 2024-06-17 ENCOUNTER — APPOINTMENT (OUTPATIENT)
Dept: RHEUMATOLOGY | Facility: CLINIC | Age: 79
End: 2024-06-17
Payer: MEDICARE

## 2024-06-24 ENCOUNTER — APPOINTMENT (OUTPATIENT)
Dept: RHEUMATOLOGY | Facility: CLINIC | Age: 79
End: 2024-06-24
Payer: MEDICARE

## 2024-06-24 DIAGNOSIS — M81.0 AGE-RELATED OSTEOPOROSIS W/OUT CURRENT PATHOLOGICAL FRACTURE: ICD-10-CM

## 2024-06-24 PROCEDURE — 96372 THER/PROPH/DIAG INJ SC/IM: CPT

## 2024-06-24 RX ORDER — DENOSUMAB 60 MG/ML
60 INJECTION SUBCUTANEOUS
Qty: 1 | Refills: 0 | Status: COMPLETED | OUTPATIENT
Start: 2024-06-24

## 2024-06-24 RX ORDER — DENOSUMAB 60 MG/ML
60 INJECTION SUBCUTANEOUS
Qty: 1 | Refills: 0 | Status: ACTIVE | COMMUNITY
Start: 2019-06-17

## 2024-06-24 RX ADMIN — DENOSUMAB 0 MG/ML: 60 INJECTION SUBCUTANEOUS at 00:00

## 2024-06-26 LAB
25(OH)D3 SERPL-MCNC: 63.3 NG/ML
ALBUMIN SERPL ELPH-MCNC: 4.1 G/DL
ALP BLD-CCNC: 103 U/L
ALT SERPL-CCNC: 73 U/L
ANION GAP SERPL CALC-SCNC: 15 MMOL/L
AST SERPL-CCNC: 68 U/L
BILIRUB SERPL-MCNC: 0.4 MG/DL
BUN SERPL-MCNC: 15 MG/DL
CALCIUM SERPL-MCNC: 9.8 MG/DL
CHLORIDE SERPL-SCNC: 95 MMOL/L
CO2 SERPL-SCNC: 20 MMOL/L
CREAT SERPL-MCNC: 1.26 MG/DL
EGFR: 44 ML/MIN/1.73M2
GLUCOSE SERPL-MCNC: 89 MG/DL
POTASSIUM SERPL-SCNC: 5 MMOL/L
PROT SERPL-MCNC: 6.7 G/DL
SODIUM SERPL-SCNC: 130 MMOL/L

## 2024-07-04 ENCOUNTER — INPATIENT (INPATIENT)
Facility: HOSPITAL | Age: 79
LOS: 0 days | Discharge: HOME HEALTH | DRG: 897 | End: 2024-07-05
Attending: STUDENT IN AN ORGANIZED HEALTH CARE EDUCATION/TRAINING PROGRAM | Admitting: STUDENT IN AN ORGANIZED HEALTH CARE EDUCATION/TRAINING PROGRAM
Payer: MEDICARE

## 2024-07-04 VITALS
RESPIRATION RATE: 18 BRPM | SYSTOLIC BLOOD PRESSURE: 157 MMHG | OXYGEN SATURATION: 99 % | TEMPERATURE: 98 F | DIASTOLIC BLOOD PRESSURE: 64 MMHG | HEART RATE: 86 BPM

## 2024-07-04 DIAGNOSIS — Z98.890 OTHER SPECIFIED POSTPROCEDURAL STATES: Chronic | ICD-10-CM

## 2024-07-04 DIAGNOSIS — Z90.710 ACQUIRED ABSENCE OF BOTH CERVIX AND UTERUS: Chronic | ICD-10-CM

## 2024-07-04 DIAGNOSIS — E87.1 HYPO-OSMOLALITY AND HYPONATREMIA: ICD-10-CM

## 2024-07-04 LAB
24R-OH-CALCIDIOL SERPL-MCNC: 50 NG/ML — SIGNIFICANT CHANGE UP (ref 30–80)
A1C WITH ESTIMATED AVERAGE GLUCOSE RESULT: 5.3 % — SIGNIFICANT CHANGE UP (ref 4–5.6)
ALBUMIN SERPL ELPH-MCNC: 3.2 G/DL — LOW (ref 3.3–5)
ALBUMIN SERPL ELPH-MCNC: 3.5 G/DL — SIGNIFICANT CHANGE UP (ref 3.3–5)
ALP SERPL-CCNC: 109 U/L — SIGNIFICANT CHANGE UP (ref 40–120)
ALP SERPL-CCNC: 117 U/L — SIGNIFICANT CHANGE UP (ref 40–120)
ALT FLD-CCNC: 119 U/L — HIGH (ref 12–78)
ALT FLD-CCNC: 144 U/L — HIGH (ref 12–78)
ANION GAP SERPL CALC-SCNC: 8 MMOL/L — SIGNIFICANT CHANGE UP (ref 5–17)
ANION GAP SERPL CALC-SCNC: 8 MMOL/L — SIGNIFICANT CHANGE UP (ref 5–17)
APPEARANCE UR: CLEAR — SIGNIFICANT CHANGE UP
APTT BLD: 25.6 SEC — SIGNIFICANT CHANGE UP (ref 24.5–35.6)
AST SERPL-CCNC: 109 U/L — HIGH (ref 15–37)
AST SERPL-CCNC: 153 U/L — HIGH (ref 15–37)
BACTERIA # UR AUTO: ABNORMAL /HPF
BASOPHILS # BLD AUTO: 0.03 K/UL — SIGNIFICANT CHANGE UP (ref 0–0.2)
BASOPHILS NFR BLD AUTO: 0.4 % — SIGNIFICANT CHANGE UP (ref 0–2)
BILIRUB DIRECT SERPL-MCNC: 0.2 MG/DL — SIGNIFICANT CHANGE UP (ref 0–0.3)
BILIRUB INDIRECT FLD-MCNC: 0.3 MG/DL — SIGNIFICANT CHANGE UP (ref 0.2–1)
BILIRUB SERPL-MCNC: 0.3 MG/DL — SIGNIFICANT CHANGE UP (ref 0.2–1.2)
BILIRUB SERPL-MCNC: 0.5 MG/DL — SIGNIFICANT CHANGE UP (ref 0.2–1.2)
BILIRUB UR-MCNC: NEGATIVE — SIGNIFICANT CHANGE UP
BUN SERPL-MCNC: 14 MG/DL — SIGNIFICANT CHANGE UP (ref 7–23)
BUN SERPL-MCNC: 18 MG/DL — SIGNIFICANT CHANGE UP (ref 7–23)
CALCIUM SERPL-MCNC: 8.5 MG/DL — SIGNIFICANT CHANGE UP (ref 8.5–10.1)
CALCIUM SERPL-MCNC: 8.6 MG/DL — SIGNIFICANT CHANGE UP (ref 8.5–10.1)
CHLORIDE SERPL-SCNC: 100 MMOL/L — SIGNIFICANT CHANGE UP (ref 96–108)
CHLORIDE SERPL-SCNC: 105 MMOL/L — SIGNIFICANT CHANGE UP (ref 96–108)
CHOLEST SERPL-MCNC: 143 MG/DL — SIGNIFICANT CHANGE UP
CO2 SERPL-SCNC: 20 MMOL/L — LOW (ref 22–31)
CO2 SERPL-SCNC: 21 MMOL/L — LOW (ref 22–31)
COLOR SPEC: YELLOW — SIGNIFICANT CHANGE UP
CREAT SERPL-MCNC: 1.05 MG/DL — SIGNIFICANT CHANGE UP (ref 0.5–1.3)
CREAT SERPL-MCNC: 1.3 MG/DL — SIGNIFICANT CHANGE UP (ref 0.5–1.3)
DIFF PNL FLD: NEGATIVE — SIGNIFICANT CHANGE UP
EGFR: 42 ML/MIN/1.73M2 — LOW
EGFR: 54 ML/MIN/1.73M2 — LOW
EOSINOPHIL # BLD AUTO: 0.01 K/UL — SIGNIFICANT CHANGE UP (ref 0–0.5)
EOSINOPHIL NFR BLD AUTO: 0.1 % — SIGNIFICANT CHANGE UP (ref 0–6)
EPI CELLS # UR: PRESENT
ESTIMATED AVERAGE GLUCOSE: 105 MG/DL — SIGNIFICANT CHANGE UP (ref 68–114)
ETHANOL SERPL-MCNC: 173 MG/DL — HIGH (ref 0–10)
FOLATE SERPL-MCNC: <2 NG/ML — LOW
GLUCOSE SERPL-MCNC: 100 MG/DL — HIGH (ref 70–99)
GLUCOSE SERPL-MCNC: 79 MG/DL — SIGNIFICANT CHANGE UP (ref 70–99)
GLUCOSE UR QL: NEGATIVE MG/DL — SIGNIFICANT CHANGE UP
HCT VFR BLD CALC: 28.7 % — LOW (ref 34.5–45)
HCT VFR BLD CALC: 29.7 % — LOW (ref 34.5–45)
HCV AB S/CO SERPL IA: 0.06 S/CO — SIGNIFICANT CHANGE UP (ref 0–0.99)
HCV AB SERPL-IMP: SIGNIFICANT CHANGE UP
HDLC SERPL-MCNC: 76 MG/DL — SIGNIFICANT CHANGE UP
HGB BLD-MCNC: 10.5 G/DL — LOW (ref 11.5–15.5)
HGB BLD-MCNC: 9.9 G/DL — LOW (ref 11.5–15.5)
HYALINE CASTS # UR AUTO: PRESENT
IMM GRANULOCYTES NFR BLD AUTO: 0.6 % — SIGNIFICANT CHANGE UP (ref 0–0.9)
INR BLD: 0.89 RATIO — SIGNIFICANT CHANGE UP (ref 0.85–1.18)
KETONES UR-MCNC: NEGATIVE MG/DL — SIGNIFICANT CHANGE UP
LACTATE SERPL-SCNC: 2.4 MMOL/L — HIGH (ref 0.7–2)
LDH SERPL L TO P-CCNC: 163 U/L — SIGNIFICANT CHANGE UP (ref 84–241)
LEUKOCYTE ESTERASE UR-ACNC: ABNORMAL
LIDOCAIN IGE QN: 70 U/L — SIGNIFICANT CHANGE UP (ref 13–75)
LIPID PNL WITH DIRECT LDL SERPL: 49 MG/DL — SIGNIFICANT CHANGE UP
LYMPHOCYTES # BLD AUTO: 1.74 K/UL — SIGNIFICANT CHANGE UP (ref 1–3.3)
LYMPHOCYTES # BLD AUTO: 24.8 % — SIGNIFICANT CHANGE UP (ref 13–44)
MAGNESIUM SERPL-MCNC: 2.1 MG/DL — SIGNIFICANT CHANGE UP (ref 1.6–2.6)
MCHC RBC-ENTMCNC: 34.5 GM/DL — SIGNIFICANT CHANGE UP (ref 32–36)
MCHC RBC-ENTMCNC: 35.4 GM/DL — SIGNIFICANT CHANGE UP (ref 32–36)
MCHC RBC-ENTMCNC: 35.5 PG — HIGH (ref 27–34)
MCHC RBC-ENTMCNC: 36.2 PG — HIGH (ref 27–34)
MCV RBC AUTO: 102.4 FL — HIGH (ref 80–100)
MCV RBC AUTO: 102.9 FL — HIGH (ref 80–100)
MONOCYTES # BLD AUTO: 0.82 K/UL — SIGNIFICANT CHANGE UP (ref 0–0.9)
MONOCYTES NFR BLD AUTO: 11.7 % — SIGNIFICANT CHANGE UP (ref 2–14)
NEUTROPHILS # BLD AUTO: 4.38 K/UL — SIGNIFICANT CHANGE UP (ref 1.8–7.4)
NEUTROPHILS NFR BLD AUTO: 62.4 % — SIGNIFICANT CHANGE UP (ref 43–77)
NITRITE UR-MCNC: NEGATIVE — SIGNIFICANT CHANGE UP
NON HDL CHOLESTEROL: 66 MG/DL — SIGNIFICANT CHANGE UP
PH UR: 5 — SIGNIFICANT CHANGE UP (ref 5–8)
PHOSPHATE SERPL-MCNC: 3.3 MG/DL — SIGNIFICANT CHANGE UP (ref 2.5–4.5)
PLATELET # BLD AUTO: 162 K/UL — SIGNIFICANT CHANGE UP (ref 150–400)
PLATELET # BLD AUTO: 176 K/UL — SIGNIFICANT CHANGE UP (ref 150–400)
POTASSIUM SERPL-MCNC: 4.6 MMOL/L — SIGNIFICANT CHANGE UP (ref 3.5–5.3)
POTASSIUM SERPL-MCNC: 4.9 MMOL/L — SIGNIFICANT CHANGE UP (ref 3.5–5.3)
POTASSIUM SERPL-SCNC: 4.6 MMOL/L — SIGNIFICANT CHANGE UP (ref 3.5–5.3)
POTASSIUM SERPL-SCNC: 4.9 MMOL/L — SIGNIFICANT CHANGE UP (ref 3.5–5.3)
PROT SERPL-MCNC: 6.2 GM/DL — SIGNIFICANT CHANGE UP (ref 6–8.3)
PROT SERPL-MCNC: 6.8 GM/DL — SIGNIFICANT CHANGE UP (ref 6–8.3)
PROT UR-MCNC: NEGATIVE MG/DL — SIGNIFICANT CHANGE UP
PROTHROM AB SERPL-ACNC: 10.1 SEC — SIGNIFICANT CHANGE UP (ref 9.5–13)
RBC # BLD: 2.79 M/UL — LOW (ref 3.8–5.2)
RBC # BLD: 2.79 M/UL — LOW (ref 3.8–5.2)
RBC # BLD: 2.9 M/UL — LOW (ref 3.8–5.2)
RBC # FLD: 11.7 % — SIGNIFICANT CHANGE UP (ref 10.3–14.5)
RBC # FLD: 11.8 % — SIGNIFICANT CHANGE UP (ref 10.3–14.5)
RBC CASTS # UR COMP ASSIST: 2 /HPF — SIGNIFICANT CHANGE UP (ref 0–4)
RETICS #: 58 K/UL — SIGNIFICANT CHANGE UP (ref 25–125)
RETICS/RBC NFR: 2.1 % — SIGNIFICANT CHANGE UP (ref 0.5–2.5)
SODIUM SERPL-SCNC: 129 MMOL/L — LOW (ref 135–145)
SODIUM SERPL-SCNC: 133 MMOL/L — LOW (ref 135–145)
SP GR SPEC: 1.01 — SIGNIFICANT CHANGE UP (ref 1–1.03)
T4 FREE SERPL-MCNC: 0.93 NG/DL — SIGNIFICANT CHANGE UP (ref 0.76–1.46)
TRIGL SERPL-MCNC: 94 MG/DL — SIGNIFICANT CHANGE UP
TSH SERPL-MCNC: 2.46 UU/ML — SIGNIFICANT CHANGE UP (ref 0.34–4.82)
UROBILINOGEN FLD QL: 0.2 MG/DL — SIGNIFICANT CHANGE UP (ref 0.2–1)
VIT B12 SERPL-MCNC: 390 PG/ML — SIGNIFICANT CHANGE UP (ref 232–1245)
WBC # BLD: 6.77 K/UL — SIGNIFICANT CHANGE UP (ref 3.8–10.5)
WBC # BLD: 7.02 K/UL — SIGNIFICANT CHANGE UP (ref 3.8–10.5)
WBC # FLD AUTO: 6.77 K/UL — SIGNIFICANT CHANGE UP (ref 3.8–10.5)
WBC # FLD AUTO: 7.02 K/UL — SIGNIFICANT CHANGE UP (ref 3.8–10.5)
WBC UR QL: 9 /HPF — HIGH (ref 0–5)

## 2024-07-04 PROCEDURE — 82306 VITAMIN D 25 HYDROXY: CPT

## 2024-07-04 PROCEDURE — 99222 1ST HOSP IP/OBS MODERATE 55: CPT

## 2024-07-04 PROCEDURE — 86803 HEPATITIS C AB TEST: CPT

## 2024-07-04 PROCEDURE — 84100 ASSAY OF PHOSPHORUS: CPT

## 2024-07-04 PROCEDURE — 97530 THERAPEUTIC ACTIVITIES: CPT | Mod: GP

## 2024-07-04 PROCEDURE — 84443 ASSAY THYROID STIM HORMONE: CPT

## 2024-07-04 PROCEDURE — 85027 COMPLETE CBC AUTOMATED: CPT

## 2024-07-04 PROCEDURE — 83615 LACTATE (LD) (LDH) ENZYME: CPT

## 2024-07-04 PROCEDURE — 83605 ASSAY OF LACTIC ACID: CPT | Mod: 91

## 2024-07-04 PROCEDURE — 93010 ELECTROCARDIOGRAM REPORT: CPT

## 2024-07-04 PROCEDURE — 80061 LIPID PANEL: CPT

## 2024-07-04 PROCEDURE — 84439 ASSAY OF FREE THYROXINE: CPT

## 2024-07-04 PROCEDURE — 99285 EMERGENCY DEPT VISIT HI MDM: CPT

## 2024-07-04 PROCEDURE — 97162 PT EVAL MOD COMPLEX 30 MIN: CPT | Mod: GP

## 2024-07-04 PROCEDURE — 83735 ASSAY OF MAGNESIUM: CPT

## 2024-07-04 PROCEDURE — 93005 ELECTROCARDIOGRAM TRACING: CPT

## 2024-07-04 PROCEDURE — 72125 CT NECK SPINE W/O DYE: CPT | Mod: 26,MC

## 2024-07-04 PROCEDURE — 82746 ASSAY OF FOLIC ACID SERUM: CPT

## 2024-07-04 PROCEDURE — 80048 BASIC METABOLIC PNL TOTAL CA: CPT

## 2024-07-04 PROCEDURE — 82607 VITAMIN B-12: CPT

## 2024-07-04 PROCEDURE — 97116 GAIT TRAINING THERAPY: CPT | Mod: GP

## 2024-07-04 PROCEDURE — 80076 HEPATIC FUNCTION PANEL: CPT

## 2024-07-04 PROCEDURE — 83036 HEMOGLOBIN GLYCOSYLATED A1C: CPT

## 2024-07-04 PROCEDURE — 71250 CT THORAX DX C-: CPT | Mod: 26,MC

## 2024-07-04 PROCEDURE — 74176 CT ABD & PELVIS W/O CONTRAST: CPT | Mod: 26,MC

## 2024-07-04 PROCEDURE — 70450 CT HEAD/BRAIN W/O DYE: CPT | Mod: 26,MC

## 2024-07-04 PROCEDURE — 36415 COLL VENOUS BLD VENIPUNCTURE: CPT

## 2024-07-04 PROCEDURE — 85045 AUTOMATED RETICULOCYTE COUNT: CPT

## 2024-07-04 RX ORDER — SODIUM CHLORIDE 0.9 % (FLUSH) 0.9 %
250 SYRINGE (ML) INJECTION ONCE
Refills: 0 | Status: COMPLETED | OUTPATIENT
Start: 2024-07-04 | End: 2024-07-04

## 2024-07-04 RX ORDER — LORAZEPAM 0.5 MG
1 TABLET ORAL
Refills: 0 | Status: DISCONTINUED | OUTPATIENT
Start: 2024-07-04 | End: 2024-07-05

## 2024-07-04 RX ORDER — SODIUM CHLORIDE 0.9 % (FLUSH) 0.9 %
1000 SYRINGE (ML) INJECTION
Refills: 0 | Status: DISCONTINUED | OUTPATIENT
Start: 2024-07-04 | End: 2024-07-05

## 2024-07-04 RX ORDER — METOPROLOL TARTRATE 50 MG
50 TABLET ORAL
Refills: 0 | Status: DISCONTINUED | OUTPATIENT
Start: 2024-07-04 | End: 2024-07-05

## 2024-07-04 RX ORDER — PANTOPRAZOLE SODIUM 40 MG/10ML
40 INJECTION, POWDER, FOR SOLUTION INTRAVENOUS
Refills: 0 | Status: DISCONTINUED | OUTPATIENT
Start: 2024-07-04 | End: 2024-07-05

## 2024-07-04 RX ORDER — LOSARTAN POTASSIUM 100 MG/1
100 TABLET, FILM COATED ORAL DAILY
Refills: 0 | Status: DISCONTINUED | OUTPATIENT
Start: 2024-07-04 | End: 2024-07-05

## 2024-07-04 RX ORDER — SODIUM CHLORIDE 0.9 % (FLUSH) 0.9 %
500 SYRINGE (ML) INJECTION ONCE
Refills: 0 | Status: COMPLETED | OUTPATIENT
Start: 2024-07-04 | End: 2024-07-04

## 2024-07-04 RX ORDER — FOLIC ACID
1 POWDER (GRAM) MISCELLANEOUS DAILY
Refills: 0 | Status: DISCONTINUED | OUTPATIENT
Start: 2024-07-04 | End: 2024-07-05

## 2024-07-04 RX ORDER — THIAMINE HCL 100 MG
100 TABLET ORAL DAILY
Refills: 0 | Status: DISCONTINUED | OUTPATIENT
Start: 2024-07-04 | End: 2024-07-05

## 2024-07-04 RX ORDER — BIOTIN/FOLIC AC/VIT BCOMP,C/ZN 3MG-0.8MG
1 TABLET ORAL DAILY
Refills: 0 | Status: DISCONTINUED | OUTPATIENT
Start: 2024-07-04 | End: 2024-07-05

## 2024-07-04 RX ORDER — ATORVASTATIN CALCIUM 20 MG/1
40 TABLET, FILM COATED ORAL AT BEDTIME
Refills: 0 | Status: DISCONTINUED | OUTPATIENT
Start: 2024-07-04 | End: 2024-07-05

## 2024-07-04 RX ORDER — METOPROLOL TARTRATE 50 MG
100 TABLET ORAL
Refills: 0 | Status: DISCONTINUED | OUTPATIENT
Start: 2024-07-04 | End: 2024-07-05

## 2024-07-04 RX ADMIN — Medication 2000 UNIT(S): at 11:14

## 2024-07-04 RX ADMIN — Medication 75 MILLILITER(S): at 11:24

## 2024-07-04 RX ADMIN — LOSARTAN POTASSIUM 100 MILLIGRAM(S): 100 TABLET, FILM COATED ORAL at 11:18

## 2024-07-04 RX ADMIN — PANTOPRAZOLE SODIUM 40 MILLIGRAM(S): 40 INJECTION, POWDER, FOR SOLUTION INTRAVENOUS at 21:48

## 2024-07-04 RX ADMIN — ATORVASTATIN CALCIUM 40 MILLIGRAM(S): 20 TABLET, FILM COATED ORAL at 21:48

## 2024-07-04 RX ADMIN — Medication 100 MILLIGRAM(S): at 11:15

## 2024-07-04 RX ADMIN — Medication 500 MILLILITER(S): at 11:46

## 2024-07-04 RX ADMIN — Medication 250 MILLILITER(S): at 02:30

## 2024-07-04 RX ADMIN — Medication 1 MILLIGRAM(S): at 11:16

## 2024-07-04 RX ADMIN — Medication 1 TABLET(S): at 11:15

## 2024-07-04 RX ADMIN — Medication 50 MILLIGRAM(S): at 21:48

## 2024-07-04 RX ADMIN — PANTOPRAZOLE SODIUM 40 MILLIGRAM(S): 40 INJECTION, POWDER, FOR SOLUTION INTRAVENOUS at 11:15

## 2024-07-05 ENCOUNTER — TRANSCRIPTION ENCOUNTER (OUTPATIENT)
Age: 79
End: 2024-07-05

## 2024-07-05 VITALS
OXYGEN SATURATION: 100 % | DIASTOLIC BLOOD PRESSURE: 65 MMHG | HEART RATE: 86 BPM | TEMPERATURE: 98 F | SYSTOLIC BLOOD PRESSURE: 140 MMHG | RESPIRATION RATE: 18 BRPM

## 2024-07-05 LAB
ANION GAP SERPL CALC-SCNC: 7 MMOL/L — SIGNIFICANT CHANGE UP (ref 5–17)
BUN SERPL-MCNC: 11 MG/DL — SIGNIFICANT CHANGE UP (ref 7–23)
CALCIUM SERPL-MCNC: 7.9 MG/DL — LOW (ref 8.5–10.1)
CHLORIDE SERPL-SCNC: 108 MMOL/L — SIGNIFICANT CHANGE UP (ref 96–108)
CO2 SERPL-SCNC: 20 MMOL/L — LOW (ref 22–31)
CREAT SERPL-MCNC: 0.86 MG/DL — SIGNIFICANT CHANGE UP (ref 0.5–1.3)
EGFR: 69 ML/MIN/1.73M2 — SIGNIFICANT CHANGE UP
GLUCOSE SERPL-MCNC: 84 MG/DL — SIGNIFICANT CHANGE UP (ref 70–99)
HCT VFR BLD CALC: 29.2 % — LOW (ref 34.5–45)
HGB BLD-MCNC: 9.9 G/DL — LOW (ref 11.5–15.5)
MCHC RBC-ENTMCNC: 33.9 GM/DL — SIGNIFICANT CHANGE UP (ref 32–36)
MCHC RBC-ENTMCNC: 35.4 PG — HIGH (ref 27–34)
MCV RBC AUTO: 104.3 FL — HIGH (ref 80–100)
PLATELET # BLD AUTO: 156 K/UL — SIGNIFICANT CHANGE UP (ref 150–400)
POTASSIUM SERPL-MCNC: 4.3 MMOL/L — SIGNIFICANT CHANGE UP (ref 3.5–5.3)
POTASSIUM SERPL-SCNC: 4.3 MMOL/L — SIGNIFICANT CHANGE UP (ref 3.5–5.3)
RBC # BLD: 2.8 M/UL — LOW (ref 3.8–5.2)
RBC # FLD: 12.2 % — SIGNIFICANT CHANGE UP (ref 10.3–14.5)
SODIUM SERPL-SCNC: 135 MMOL/L — SIGNIFICANT CHANGE UP (ref 135–145)
WBC # BLD: 7.45 K/UL — SIGNIFICANT CHANGE UP (ref 3.8–10.5)
WBC # FLD AUTO: 7.45 K/UL — SIGNIFICANT CHANGE UP (ref 3.8–10.5)

## 2024-07-05 PROCEDURE — 99239 HOSP IP/OBS DSCHRG MGMT >30: CPT

## 2024-07-05 RX ORDER — THIAMINE HCL 100 MG
1 TABLET ORAL
Qty: 30 | Refills: 0
Start: 2024-07-05 | End: 2024-08-03

## 2024-07-05 RX ORDER — BIOTIN/FOLIC AC/VIT BCOMP,C/ZN 3MG-0.8MG
1 TABLET ORAL
Qty: 30 | Refills: 0
Start: 2024-07-05 | End: 2024-08-03

## 2024-07-05 RX ORDER — FOLIC ACID
1 POWDER (GRAM) MISCELLANEOUS
Qty: 30 | Refills: 0
Start: 2024-07-05 | End: 2024-08-03

## 2024-07-05 RX ADMIN — LOSARTAN POTASSIUM 100 MILLIGRAM(S): 100 TABLET, FILM COATED ORAL at 09:27

## 2024-07-05 RX ADMIN — Medication 100 MILLIGRAM(S): at 09:27

## 2024-07-05 RX ADMIN — Medication 100 MILLIGRAM(S): at 09:26

## 2024-07-05 RX ADMIN — Medication 2000 UNIT(S): at 09:27

## 2024-07-05 RX ADMIN — Medication 1 TABLET(S): at 09:26

## 2024-07-05 RX ADMIN — PANTOPRAZOLE SODIUM 40 MILLIGRAM(S): 40 INJECTION, POWDER, FOR SOLUTION INTRAVENOUS at 09:35

## 2024-07-05 RX ADMIN — Medication 1 MILLIGRAM(S): at 09:26

## 2024-07-10 ENCOUNTER — APPOINTMENT (OUTPATIENT)
Age: 79
End: 2024-07-10
Payer: MEDICARE

## 2024-07-10 VITALS
HEIGHT: 61 IN | SYSTOLIC BLOOD PRESSURE: 142 MMHG | DIASTOLIC BLOOD PRESSURE: 70 MMHG | BODY MASS INDEX: 24.55 KG/M2 | WEIGHT: 130 LBS

## 2024-07-10 DIAGNOSIS — K21.9 GASTRO-ESOPHAGEAL REFLUX DISEASE W/OUT ESOPHAGITIS: ICD-10-CM

## 2024-07-10 DIAGNOSIS — Z09 ENCOUNTER FOR FOLLOW-UP EXAMINATION AFTER COMPLETED TREATMENT FOR CONDITIONS OTHER THAN MALIGNANT NEOPLASM: ICD-10-CM

## 2024-07-10 DIAGNOSIS — Z87.19 PERSONAL HISTORY OF OTHER DISEASES OF THE DIGESTIVE SYSTEM: ICD-10-CM

## 2024-07-10 DIAGNOSIS — R10.13 EPIGASTRIC PAIN: ICD-10-CM

## 2024-07-10 PROCEDURE — 99204 OFFICE O/P NEW MOD 45 MIN: CPT

## 2024-07-10 NOTE — ED PROVIDER NOTE - PROGRESS NOTE DETAILS
Patient here for athletes foot. Patient states she feels it has improved. Jessica Artis MD   Electronically signed by Cynthia Hicks LPN on 7/10/2024 at 2:19 PM     No obvious fracture noted on xray. Will place in sling to decrease tension on sutures, discussed pendulum exercises to prevent adhesive capsulitis. Wound cleansed with copious NS, laceration repaired, wound care instructions discussed with pt. Pto Fu with PMD in 2 days for wound check, suture removal should be done in 10-14 days. If pt continues to have pain to her elbow or develops decreased ROm she will FU with Dr. mcknight.  Pt asked to return to ED immediately for any new or concerning sx or worsening sx. Pt acknowledges and understands plan. -Liam Colbert PA-C

## 2024-07-11 RX ORDER — FAMOTIDINE 40 MG/1
40 TABLET, FILM COATED ORAL
Qty: 90 | Refills: 2 | Status: ACTIVE | COMMUNITY
Start: 2024-07-10 | End: 1900-01-01

## 2024-07-11 RX ORDER — SUCRALFATE 1 G/1
1 TABLET ORAL
Qty: 40 | Refills: 0 | Status: ACTIVE | COMMUNITY
Start: 2024-07-10 | End: 1900-01-01

## 2024-07-18 ENCOUNTER — RX RENEWAL (OUTPATIENT)
Age: 79
End: 2024-07-18

## 2024-07-18 DIAGNOSIS — K26.9 DUODENAL ULCER, UNSPECIFIED AS ACUTE OR CHRONIC, WITHOUT HEMORRHAGE OR PERFORATION: ICD-10-CM

## 2024-07-18 DIAGNOSIS — E78.5 HYPERLIPIDEMIA, UNSPECIFIED: ICD-10-CM
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
## 2024-07-18 DIAGNOSIS — J84.10 PULMONARY FIBROSIS, UNSPECIFIED: ICD-10-CM

## 2024-07-18 DIAGNOSIS — F10.10 ALCOHOL ABUSE, UNCOMPLICATED: ICD-10-CM

## 2024-07-18 DIAGNOSIS — S00.81XA ABRASION OF OTHER PART OF HEAD, INITIAL ENCOUNTER: ICD-10-CM

## 2024-07-18 DIAGNOSIS — E04.1 NONTOXIC SINGLE THYROID NODULE: ICD-10-CM

## 2024-07-18 DIAGNOSIS — E86.0 DEHYDRATION: ICD-10-CM

## 2024-07-18 DIAGNOSIS — K57.30 DIVERTICULOSIS OF LARGE INTESTINE WITHOUT PERFORATION OR ABSCESS WITHOUT BLEEDING: ICD-10-CM

## 2024-07-18 DIAGNOSIS — D63.8 ANEMIA IN OTHER CHRONIC DISEASES CLASSIFIED ELSEWHERE: ICD-10-CM

## 2024-07-18 DIAGNOSIS — S22.39XS: ICD-10-CM
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
## 2024-07-18 DIAGNOSIS — S32.009A UNSPECIFIED FRACTURE OF UNSPECIFIED LUMBAR VERTEBRA, INITIAL ENCOUNTER FOR CLOSED FRACTURE: ICD-10-CM

## 2024-07-18 DIAGNOSIS — R53.1 WEAKNESS: ICD-10-CM

## 2024-07-18 DIAGNOSIS — R74.01 ELEVATION OF LEVELS OF LIVER TRANSAMINASE LEVELS: ICD-10-CM

## 2024-07-18 DIAGNOSIS — D53.9 NUTRITIONAL ANEMIA, UNSPECIFIED: ICD-10-CM

## 2024-07-18 DIAGNOSIS — S80.211A ABRASION, RIGHT KNEE, INITIAL ENCOUNTER: ICD-10-CM

## 2024-07-18 DIAGNOSIS — M40.40 POSTURAL LORDOSIS, SITE UNSPECIFIED: ICD-10-CM

## 2024-07-18 DIAGNOSIS — K29.20 ALCOHOLIC GASTRITIS WITHOUT BLEEDING: ICD-10-CM

## 2024-07-18 DIAGNOSIS — S22.089A UNSPECIFIED FRACTURE OF T11-T12 VERTEBRA, INITIAL ENCOUNTER FOR CLOSED FRACTURE: ICD-10-CM

## 2024-07-18 DIAGNOSIS — E87.1 HYPO-OSMOLALITY AND HYPONATREMIA: ICD-10-CM

## 2024-07-18 DIAGNOSIS — M47.812 SPONDYLOSIS WITHOUT MYELOPATHY OR RADICULOPATHY, CERVICAL REGION: ICD-10-CM

## 2024-07-18 DIAGNOSIS — I10 ESSENTIAL (PRIMARY) HYPERTENSION: ICD-10-CM
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
## 2024-07-24 ENCOUNTER — RESULT REVIEW (OUTPATIENT)
Age: 79
End: 2024-07-24

## 2024-07-24 ENCOUNTER — APPOINTMENT (OUTPATIENT)
Dept: GASTROENTEROLOGY | Facility: AMBULATORY MEDICAL SERVICES | Age: 79
End: 2024-07-24
Payer: MEDICARE

## 2024-07-24 PROCEDURE — 43239 EGD BIOPSY SINGLE/MULTIPLE: CPT

## 2024-11-15 RX ORDER — DENOSUMAB 60 MG/ML
60 INJECTION SUBCUTANEOUS
Qty: 1 | Refills: 0 | Status: ACTIVE | COMMUNITY
Start: 2024-11-15 | End: 1900-01-01

## 2024-12-30 ENCOUNTER — APPOINTMENT (OUTPATIENT)
Dept: RHEUMATOLOGY | Facility: CLINIC | Age: 79
End: 2024-12-30
Payer: MEDICARE

## 2024-12-30 ENCOUNTER — NON-APPOINTMENT (OUTPATIENT)
Age: 79
End: 2024-12-30

## 2024-12-30 VITALS
OXYGEN SATURATION: 94 % | DIASTOLIC BLOOD PRESSURE: 70 MMHG | SYSTOLIC BLOOD PRESSURE: 150 MMHG | HEIGHT: 61 IN | TEMPERATURE: 97.5 F | HEART RATE: 76 BPM | WEIGHT: 130 LBS | BODY MASS INDEX: 24.55 KG/M2

## 2024-12-30 DIAGNOSIS — R29.6 REPEATED FALLS: ICD-10-CM

## 2024-12-30 DIAGNOSIS — M81.0 AGE-RELATED OSTEOPOROSIS W/OUT CURRENT PATHOLOGICAL FRACTURE: ICD-10-CM

## 2024-12-30 DIAGNOSIS — Z09 ENCOUNTER FOR FOLLOW-UP EXAMINATION AFTER COMPLETED TREATMENT FOR CONDITIONS OTHER THAN MALIGNANT NEOPLASM: ICD-10-CM

## 2024-12-30 DIAGNOSIS — M15.9 POLYOSTEOARTHRITIS, UNSPECIFIED: ICD-10-CM

## 2024-12-30 PROCEDURE — 36415 COLL VENOUS BLD VENIPUNCTURE: CPT

## 2024-12-30 PROCEDURE — 96372 THER/PROPH/DIAG INJ SC/IM: CPT

## 2024-12-30 PROCEDURE — 99214 OFFICE O/P EST MOD 30 MIN: CPT | Mod: 25

## 2024-12-30 RX ORDER — DENOSUMAB 60 MG/ML
60 INJECTION SUBCUTANEOUS
Qty: 1 | Refills: 0 | Status: COMPLETED | OUTPATIENT
Start: 2024-12-30

## 2024-12-30 RX ORDER — EZETIMIBE 10 MG/1
TABLET ORAL
Refills: 0 | Status: ACTIVE | COMMUNITY

## 2024-12-30 RX ADMIN — DENOSUMAB 0 MG/ML: 60 INJECTION SUBCUTANEOUS at 00:00

## 2024-12-31 LAB
25(OH)D3 SERPL-MCNC: 59.8 NG/ML
ALBUMIN SERPL ELPH-MCNC: 4.2 G/DL
ALP BLD-CCNC: 111 U/L
ALT SERPL-CCNC: 36 U/L
ANION GAP SERPL CALC-SCNC: 13 MMOL/L
AST SERPL-CCNC: 45 U/L
BILIRUB SERPL-MCNC: 0.4 MG/DL
BUN SERPL-MCNC: 13 MG/DL
CALCIUM SERPL-MCNC: 9.3 MG/DL
CHLORIDE SERPL-SCNC: 98 MMOL/L
CO2 SERPL-SCNC: 25 MMOL/L
CREAT SERPL-MCNC: 1.01 MG/DL
EGFR: 57 ML/MIN/1.73M2
GLUCOSE SERPL-MCNC: 81 MG/DL
POTASSIUM SERPL-SCNC: 4.9 MMOL/L
PROT SERPL-MCNC: 6.9 G/DL
SODIUM SERPL-SCNC: 136 MMOL/L

## 2025-01-06 NOTE — DISCHARGE NOTE ADULT - CARE PROVIDER_API CALL
Chan Kevin (MD), Cardiovascular Disease; Internal Medicine  [address] [place] [address]  Phone: [phone]  Fax: [phone] Any Depression?: No Myalgia Treatment: I explained this is common when taking isotretinoin. If this worsens they will contact us. They may try OTC ibuprofen. Use Therapeutic Ranged Or Therapeutic Target: Range Hypercholesterolemia Monitoring: I explained this is common when taking isotretinoin. We will monitor closely. Dosing Month 3 (Required For Cumulative Dosing): 40mg BID Most Recent Triglycerides (Optional): WNL Pounds Preamble Statement (Weight Entered In Details Tab): Reported Weight in pounds: Nosebleeds Normal Treatment: I explained this is common when taking isotretinoin. I recommended saline mist in each nostril multiple times a day. If this worsens they will contact us. What Is The Patient's Gender: Male Kilograms Preamble Statement (Weight Entered In Details Tab): Reported Weight in kilograms: Is Cheilitis Present?: Yes - Normal Treatment Xerosis Normal Treatment: I recommended application of Cetaphil or CeraVe numerous times a day and before going to bed to all dry areas. Female Completion Statement: After discussing her treatment course we decided to discontinue isotretinoin therapy at this time. I explained that she would need to continue her birth control methods for at least one month after the last dosage. She should also get a pregnancy test one month after the last dose. She shouldn't donate blood for one month after the last dose. She should call with any new symptoms of depression. Hypertriglyceridemia Monitoring: I explained this is common when taking isotretinoin. If this worsens they will contact us. Cheilitis Aggressive Treatment: I recommended application of Vaseline or Aquaphor numerous times a day (as often as every hour) and before going to bed. I also prescribed a topical steroid for twice daily use. Lower Range (In Mg/Kg): 180 Upper Range (In Mg/Kg): 220 Are Labs Available For Review?: Yes Headache Monitoring: I recommended monitoring the headaches for now. There is no evidence of increased intracranial pressure. They were instructed to call if the headaches are worsening. Retinoid Dermatitis Normal Treatment: I recommended more frequent application of Cetaphil or CeraVe to the areas of dermatitis. Male Completion Statement: After discussing his treatment course we decided to discontinue isotretinoin therapy at this time. He shouldn't donate blood for one month after the last dose. He should call with any new symptoms of depression. Xerosis Aggressive Treatment: I recommended application of Cetaphil or CeraVe numerous times a day and before going to bed to all dry areas. I also prescribed a topical steroid for twice daily use. Target Cumulative Dosage (In Mg/Kg): 135 Cheilitis Normal Treatment: I recommended application of Vaseline or Aquaphor numerous times a day (as often as every hour) and before going to bed. Patient Weight (Optional But Required For Cumulative Dose-Numbers And Decimals Only): 166 Retinoid Dermatitis Aggressive Treatment: I recommended more frequent application of Cetaphil or CeraVe to the areas of dermatitis. I also prescribed a topical steroid for twice daily use until the dermatitis resolves. Weight Units: pounds Comments: Labs reviewed on pt phone - WNL Months Of Therapy Completed: 4 Xerosis Normal Treatment: I recommended application of Cetaphil or CeraVe numerous times a day going to bed to all dry areas. Dosing Month 1 (Required For Cumulative Dosing): 40mg Daily Xerosis Aggressive Treatment: I recommended application of Cetaphil or CeraVe numerous times a day going to bed to all dry areas. I also prescribed a topical steroid for twice daily use. Counseling Text: I reviewed the side effect in detail. Patient should get monthly blood tests, not donate blood, not drive at night if vision affected, and not share medication. Detail Level: Zone Female Pregnancy Counseling Text: Female patients should also be on two forms of birth control while taking this medication and for one month after their last dose.

## 2025-02-06 ENCOUNTER — RX RENEWAL (OUTPATIENT)
Age: 80
End: 2025-02-06

## 2025-02-13 NOTE — ED ADULT NURSE NOTE - HISTORY OF COVID-19 VACCINATION
February 13, 2025      Sven Clayton  [id number] [place] [address]    Dear Sven:     You have been made inactive on our Kidney transplant waitlist as of 2/13/2025. Your waitlist status has changed because:     [x]  Temporarily too sick      While you are inactive on our transplant waitlist, you will continue to accumulate wait time, but you are currently not eligible for a transplant if a matching organ becomes available for you. Please contact your transplant coordinator for any questions related to your current waitlist status [phone].    Our organ transplant waiting list is overseen by the [employer] ([employer]). [employer] is a national non-profit organization that administers the nation's only transplant organ waiting list.  We are including the [employer] ([employer]) Patient Information Letter, as required. You may call OS to discuss problems or concerns you have experienced with any transplant center. Also, we encourage you to ask any member of the Transplant Team here at [facility] to answer any questions you may have.     Please do not hesitate to contact us with any questions or concerns.     Sincerely,      [facility]  [facility]    cc: [facility]  [address] [facility] [address]  Phone: [phone]  Fax: [phone]     Enclosure: OS Patient Information Letter   Vaccine status unknown

## 2025-02-20 NOTE — ED ADULT NURSE NOTE - NSFALLRSKHMRSKTYOTFT_ED_ALL_ED
----- Message from Dr. Patrick Waller MD sent at 2/20/2025  9:13 AM EST -----  BMP shows elevated glucose and mildly decreased kidney function.  Continue good hydration.  Will plan to recheck labs in June and order at that appointment    Please advise patient.  Patrick Waller MD     s/p fall 1 week ago

## 2025-03-18 ENCOUNTER — INPATIENT (INPATIENT)
Facility: HOSPITAL | Age: 80
LOS: 11 days | Discharge: NURSING, REHAB OR LONG-TERM CARE | DRG: 896 | End: 2025-03-30
Attending: HOSPITALIST | Admitting: INTERNAL MEDICINE
Payer: MEDICARE

## 2025-03-18 VITALS
TEMPERATURE: 98 F | HEART RATE: 115 BPM | WEIGHT: 162.04 LBS | SYSTOLIC BLOOD PRESSURE: 151 MMHG | DIASTOLIC BLOOD PRESSURE: 96 MMHG | OXYGEN SATURATION: 99 % | HEIGHT: 65 IN | RESPIRATION RATE: 16 BRPM

## 2025-03-18 DIAGNOSIS — Z98.890 OTHER SPECIFIED POSTPROCEDURAL STATES: Chronic | ICD-10-CM

## 2025-03-18 DIAGNOSIS — Z90.710 ACQUIRED ABSENCE OF BOTH CERVIX AND UTERUS: Chronic | ICD-10-CM

## 2025-03-18 DIAGNOSIS — F10.939 ALCOHOL USE, UNSPECIFIED WITH WITHDRAWAL, UNSPECIFIED: ICD-10-CM

## 2025-03-18 LAB
ALBUMIN SERPL ELPH-MCNC: 3.5 G/DL — SIGNIFICANT CHANGE UP (ref 3.3–5)
ALP SERPL-CCNC: 111 U/L — SIGNIFICANT CHANGE UP (ref 40–120)
ALT FLD-CCNC: 98 U/L — HIGH (ref 12–78)
ANION GAP SERPL CALC-SCNC: 13 MMOL/L — SIGNIFICANT CHANGE UP (ref 5–17)
APPEARANCE UR: ABNORMAL
APTT BLD: 29.9 SEC — SIGNIFICANT CHANGE UP (ref 24.5–35.6)
AST SERPL-CCNC: 101 U/L — HIGH (ref 15–37)
BACTERIA # UR AUTO: ABNORMAL /HPF
BASOPHILS # BLD AUTO: 0.03 K/UL — SIGNIFICANT CHANGE UP (ref 0–0.2)
BASOPHILS NFR BLD AUTO: 0.5 % — SIGNIFICANT CHANGE UP (ref 0–2)
BILIRUB SERPL-MCNC: 0.8 MG/DL — SIGNIFICANT CHANGE UP (ref 0.2–1.2)
BILIRUB UR-MCNC: NEGATIVE — SIGNIFICANT CHANGE UP
BLD GP AB SCN SERPL QL: SIGNIFICANT CHANGE UP
BUN SERPL-MCNC: 15 MG/DL — SIGNIFICANT CHANGE UP (ref 7–23)
CALCIUM SERPL-MCNC: 9 MG/DL — SIGNIFICANT CHANGE UP (ref 8.5–10.1)
CHLORIDE SERPL-SCNC: 101 MMOL/L — SIGNIFICANT CHANGE UP (ref 96–108)
CK SERPL-CCNC: 74 U/L — SIGNIFICANT CHANGE UP (ref 26–192)
CO2 SERPL-SCNC: 22 MMOL/L — SIGNIFICANT CHANGE UP (ref 22–31)
COD CRY URNS QL: PRESENT
COLOR SPEC: YELLOW — SIGNIFICANT CHANGE UP
COMMENT - URINE: SIGNIFICANT CHANGE UP
CREAT SERPL-MCNC: 0.92 MG/DL — SIGNIFICANT CHANGE UP (ref 0.5–1.3)
DIFF PNL FLD: ABNORMAL
EGFR: 63 ML/MIN/1.73M2 — SIGNIFICANT CHANGE UP
EGFR: 63 ML/MIN/1.73M2 — SIGNIFICANT CHANGE UP
EOSINOPHIL # BLD AUTO: 0.03 K/UL — SIGNIFICANT CHANGE UP (ref 0–0.5)
EOSINOPHIL NFR BLD AUTO: 0.5 % — SIGNIFICANT CHANGE UP (ref 0–6)
ETHANOL SERPL-MCNC: <10 MG/DL — SIGNIFICANT CHANGE UP (ref 0–10)
GLUCOSE SERPL-MCNC: 88 MG/DL — SIGNIFICANT CHANGE UP (ref 70–99)
GLUCOSE UR QL: NEGATIVE MG/DL — SIGNIFICANT CHANGE UP
GRAN CASTS # UR COMP ASSIST: PRESENT
HCT VFR BLD CALC: 30 % — LOW (ref 34.5–45)
HGB BLD-MCNC: 10.2 G/DL — LOW (ref 11.5–15.5)
IMM GRANULOCYTES # BLD AUTO: 0.03 K/UL — SIGNIFICANT CHANGE UP (ref 0–0.07)
IMM GRANULOCYTES NFR BLD AUTO: 0.5 % — SIGNIFICANT CHANGE UP (ref 0–0.9)
INR BLD: 0.91 RATIO — SIGNIFICANT CHANGE UP (ref 0.85–1.16)
KETONES UR-MCNC: NEGATIVE MG/DL — SIGNIFICANT CHANGE UP
LEUKOCYTE ESTERASE UR-ACNC: ABNORMAL
LYMPHOCYTES # BLD AUTO: 0.81 K/UL — LOW (ref 1–3.3)
LYMPHOCYTES NFR BLD AUTO: 13.6 % — SIGNIFICANT CHANGE UP (ref 13–44)
MAGNESIUM SERPL-MCNC: 1.9 MG/DL — SIGNIFICANT CHANGE UP (ref 1.6–2.6)
MAGNESIUM SERPL-MCNC: 2.1 MG/DL — SIGNIFICANT CHANGE UP (ref 1.6–2.6)
MCHC RBC-ENTMCNC: 34 G/DL — SIGNIFICANT CHANGE UP (ref 32–36)
MCHC RBC-ENTMCNC: 34.1 PG — HIGH (ref 27–34)
MCV RBC AUTO: 100.3 FL — HIGH (ref 80–100)
MONOCYTES # BLD AUTO: 0.6 K/UL — SIGNIFICANT CHANGE UP (ref 0–0.9)
MONOCYTES NFR BLD AUTO: 10.1 % — SIGNIFICANT CHANGE UP (ref 2–14)
NEUTROPHILS # BLD AUTO: 4.46 K/UL — SIGNIFICANT CHANGE UP (ref 1.8–7.4)
NEUTROPHILS NFR BLD AUTO: 74.8 % — SIGNIFICANT CHANGE UP (ref 43–77)
NITRITE UR-MCNC: NEGATIVE — SIGNIFICANT CHANGE UP
NRBC # BLD AUTO: 0 K/UL — SIGNIFICANT CHANGE UP (ref 0–0)
NRBC # FLD: 0 K/UL — SIGNIFICANT CHANGE UP (ref 0–0)
NRBC BLD AUTO-RTO: 0 /100 WBCS — SIGNIFICANT CHANGE UP (ref 0–0)
PH UR: 5.5 — SIGNIFICANT CHANGE UP (ref 5–8)
PHOSPHATE SERPL-MCNC: 3.7 MG/DL — SIGNIFICANT CHANGE UP (ref 2.5–4.5)
PHOSPHATE SERPL-MCNC: 3.8 MG/DL — SIGNIFICANT CHANGE UP (ref 2.5–4.5)
PLATELET # BLD AUTO: 116 K/UL — LOW (ref 150–400)
PMV BLD: 11.1 FL — SIGNIFICANT CHANGE UP (ref 7–13)
POTASSIUM SERPL-MCNC: 4.8 MMOL/L — SIGNIFICANT CHANGE UP (ref 3.5–5.3)
POTASSIUM SERPL-SCNC: 4.8 MMOL/L — SIGNIFICANT CHANGE UP (ref 3.5–5.3)
PROT SERPL-MCNC: 6.7 GM/DL — SIGNIFICANT CHANGE UP (ref 6–8.3)
PROT UR-MCNC: NEGATIVE MG/DL — SIGNIFICANT CHANGE UP
PROTHROM AB SERPL-ACNC: 10.7 SEC — SIGNIFICANT CHANGE UP (ref 9.9–13.4)
RBC # BLD: 2.99 M/UL — LOW (ref 3.8–5.2)
RBC # FLD: 14.6 % — HIGH (ref 10.3–14.5)
RBC CASTS # UR COMP ASSIST: 2 /HPF — SIGNIFICANT CHANGE UP (ref 0–4)
SODIUM SERPL-SCNC: 136 MMOL/L — SIGNIFICANT CHANGE UP (ref 135–145)
SP GR SPEC: 1.01 — SIGNIFICANT CHANGE UP (ref 1–1.03)
TROPONIN I, HIGH SENSITIVITY RESULT: 15.19 NG/L — SIGNIFICANT CHANGE UP
UROBILINOGEN FLD QL: 1 MG/DL — SIGNIFICANT CHANGE UP (ref 0.2–1)
WBC # BLD: 5.96 K/UL — SIGNIFICANT CHANGE UP (ref 3.8–10.5)
WBC # FLD AUTO: 5.96 K/UL — SIGNIFICANT CHANGE UP (ref 3.8–10.5)
WBC UR QL: 5 /HPF — SIGNIFICANT CHANGE UP (ref 0–5)

## 2025-03-18 PROCEDURE — 70450 CT HEAD/BRAIN W/O DYE: CPT | Mod: 26

## 2025-03-18 PROCEDURE — 85027 COMPLETE CBC AUTOMATED: CPT

## 2025-03-18 PROCEDURE — 73502 X-RAY EXAM HIP UNI 2-3 VIEWS: CPT | Mod: 26,RT

## 2025-03-18 PROCEDURE — 97530 THERAPEUTIC ACTIVITIES: CPT | Mod: GP

## 2025-03-18 PROCEDURE — 83550 IRON BINDING TEST: CPT

## 2025-03-18 PROCEDURE — 97162 PT EVAL MOD COMPLEX 30 MIN: CPT | Mod: GP

## 2025-03-18 PROCEDURE — 84425 ASSAY OF VITAMIN B-1: CPT

## 2025-03-18 PROCEDURE — 99223 1ST HOSP IP/OBS HIGH 75: CPT

## 2025-03-18 PROCEDURE — 82570 ASSAY OF URINE CREATININE: CPT

## 2025-03-18 PROCEDURE — 82746 ASSAY OF FOLIC ACID SERUM: CPT

## 2025-03-18 PROCEDURE — 36415 COLL VENOUS BLD VENIPUNCTURE: CPT

## 2025-03-18 PROCEDURE — 82728 ASSAY OF FERRITIN: CPT

## 2025-03-18 PROCEDURE — 80076 HEPATIC FUNCTION PANEL: CPT

## 2025-03-18 PROCEDURE — 76376 3D RENDER W/INTRP POSTPROCES: CPT | Mod: 26

## 2025-03-18 PROCEDURE — 84300 ASSAY OF URINE SODIUM: CPT

## 2025-03-18 PROCEDURE — 83735 ASSAY OF MAGNESIUM: CPT

## 2025-03-18 PROCEDURE — 71045 X-RAY EXAM CHEST 1 VIEW: CPT | Mod: 26

## 2025-03-18 PROCEDURE — 76700 US EXAM ABDOM COMPLETE: CPT

## 2025-03-18 PROCEDURE — 97116 GAIT TRAINING THERAPY: CPT | Mod: GP

## 2025-03-18 PROCEDURE — 84439 ASSAY OF FREE THYROXINE: CPT

## 2025-03-18 PROCEDURE — 85025 COMPLETE CBC W/AUTO DIFF WBC: CPT

## 2025-03-18 PROCEDURE — 81001 URINALYSIS AUTO W/SCOPE: CPT

## 2025-03-18 PROCEDURE — 80307 DRUG TEST PRSMV CHEM ANLYZR: CPT

## 2025-03-18 PROCEDURE — 82607 VITAMIN B-12: CPT

## 2025-03-18 PROCEDURE — 99285 EMERGENCY DEPT VISIT HI MDM: CPT

## 2025-03-18 PROCEDURE — 82140 ASSAY OF AMMONIA: CPT

## 2025-03-18 PROCEDURE — 80053 COMPREHEN METABOLIC PANEL: CPT

## 2025-03-18 PROCEDURE — 72192 CT PELVIS W/O DYE: CPT | Mod: 26

## 2025-03-18 PROCEDURE — 73552 X-RAY EXAM OF FEMUR 2/>: CPT | Mod: 26,RT

## 2025-03-18 PROCEDURE — 84100 ASSAY OF PHOSPHORUS: CPT

## 2025-03-18 PROCEDURE — 80048 BASIC METABOLIC PNL TOTAL CA: CPT

## 2025-03-18 PROCEDURE — 84443 ASSAY THYROID STIM HORMONE: CPT

## 2025-03-18 PROCEDURE — 83540 ASSAY OF IRON: CPT

## 2025-03-18 PROCEDURE — 87086 URINE CULTURE/COLONY COUNT: CPT

## 2025-03-18 RX ORDER — LORAZEPAM 4 MG/ML
1.5 VIAL (ML) INJECTION EVERY 6 HOURS
Refills: 0 | Status: DISCONTINUED | OUTPATIENT
Start: 2025-03-19 | End: 2025-03-19

## 2025-03-18 RX ORDER — LOSARTAN POTASSIUM 100 MG/1
100 TABLET, FILM COATED ORAL DAILY
Refills: 0 | Status: DISCONTINUED | OUTPATIENT
Start: 2025-03-18 | End: 2025-03-30

## 2025-03-18 RX ORDER — ENOXAPARIN SODIUM 100 MG/ML
40 INJECTION SUBCUTANEOUS EVERY 24 HOURS
Refills: 0 | Status: DISCONTINUED | OUTPATIENT
Start: 2025-03-18 | End: 2025-03-19

## 2025-03-18 RX ORDER — METOPROLOL SUCCINATE 50 MG/1
100 TABLET, EXTENDED RELEASE ORAL DAILY
Refills: 0 | Status: DISCONTINUED | OUTPATIENT
Start: 2025-03-18 | End: 2025-03-30

## 2025-03-18 RX ORDER — LORAZEPAM 4 MG/ML
2 VIAL (ML) INJECTION ONCE
Refills: 0 | Status: DISCONTINUED | OUTPATIENT
Start: 2025-03-18 | End: 2025-03-18

## 2025-03-18 RX ORDER — METOPROLOL SUCCINATE 50 MG/1
50 TABLET, EXTENDED RELEASE ORAL AT BEDTIME
Refills: 0 | Status: DISCONTINUED | OUTPATIENT
Start: 2025-03-18 | End: 2025-03-30

## 2025-03-18 RX ORDER — LORAZEPAM 4 MG/ML
2 VIAL (ML) INJECTION EVERY 6 HOURS
Refills: 0 | Status: DISCONTINUED | OUTPATIENT
Start: 2025-03-18 | End: 2025-03-19

## 2025-03-18 RX ORDER — FOLIC ACID 1 MG/1
1 TABLET ORAL DAILY
Refills: 0 | Status: DISCONTINUED | OUTPATIENT
Start: 2025-03-18 | End: 2025-03-30

## 2025-03-18 RX ORDER — ACETAMINOPHEN 500 MG/5ML
650 LIQUID (ML) ORAL EVERY 6 HOURS
Refills: 0 | Status: DISCONTINUED | OUTPATIENT
Start: 2025-03-18 | End: 2025-03-30

## 2025-03-18 RX ORDER — ONDANSETRON HCL/PF 4 MG/2 ML
4 VIAL (ML) INJECTION EVERY 8 HOURS
Refills: 0 | Status: DISCONTINUED | OUTPATIENT
Start: 2025-03-18 | End: 2025-03-30

## 2025-03-18 RX ORDER — CEFTRIAXONE 500 MG/1
1000 INJECTION, POWDER, FOR SOLUTION INTRAMUSCULAR; INTRAVENOUS EVERY 24 HOURS
Refills: 0 | Status: COMPLETED | OUTPATIENT
Start: 2025-03-18 | End: 2025-03-21

## 2025-03-18 RX ORDER — LORAZEPAM 4 MG/ML
VIAL (ML) INJECTION
Refills: 0 | Status: DISCONTINUED | OUTPATIENT
Start: 2025-03-18 | End: 2025-03-19

## 2025-03-18 RX ORDER — ACETAMINOPHEN 500 MG/5ML
1000 LIQUID (ML) ORAL ONCE
Refills: 0 | Status: COMPLETED | OUTPATIENT
Start: 2025-03-18 | End: 2025-03-18

## 2025-03-18 RX ORDER — ROSUVASTATIN CALCIUM 5 MG/1
10 TABLET, FILM COATED ORAL AT BEDTIME
Refills: 0 | Status: DISCONTINUED | OUTPATIENT
Start: 2025-03-18 | End: 2025-03-30

## 2025-03-18 RX ORDER — MELATONIN 5 MG
3 TABLET ORAL AT BEDTIME
Refills: 0 | Status: DISCONTINUED | OUTPATIENT
Start: 2025-03-18 | End: 2025-03-30

## 2025-03-18 RX ORDER — CEFTRIAXONE 500 MG/1
1000 INJECTION, POWDER, FOR SOLUTION INTRAMUSCULAR; INTRAVENOUS ONCE
Refills: 0 | Status: COMPLETED | OUTPATIENT
Start: 2025-03-18 | End: 2025-03-18

## 2025-03-18 RX ORDER — CEFTRIAXONE 500 MG/1
1000 INJECTION, POWDER, FOR SOLUTION INTRAMUSCULAR; INTRAVENOUS ONCE
Refills: 0 | Status: DISCONTINUED | OUTPATIENT
Start: 2025-03-18 | End: 2025-03-18

## 2025-03-18 RX ORDER — EZETIMIBE 10 MG/1
10 TABLET ORAL DAILY
Refills: 0 | Status: DISCONTINUED | OUTPATIENT
Start: 2025-03-18 | End: 2025-03-30

## 2025-03-18 RX ORDER — ONDANSETRON HCL/PF 4 MG/2 ML
4 VIAL (ML) INJECTION ONCE
Refills: 0 | Status: COMPLETED | OUTPATIENT
Start: 2025-03-18 | End: 2025-03-18

## 2025-03-18 RX ORDER — CYST/ALA/Q10/PHOS.SER/DHA/BROC 100-20-50
1 POWDER (GRAM) ORAL DAILY
Refills: 0 | Status: DISCONTINUED | OUTPATIENT
Start: 2025-03-18 | End: 2025-03-30

## 2025-03-18 RX ORDER — MAGNESIUM, ALUMINUM HYDROXIDE 200-200 MG
30 TABLET,CHEWABLE ORAL EVERY 4 HOURS
Refills: 0 | Status: DISCONTINUED | OUTPATIENT
Start: 2025-03-18 | End: 2025-03-30

## 2025-03-18 RX ADMIN — METOPROLOL SUCCINATE 50 MILLIGRAM(S): 50 TABLET, EXTENDED RELEASE ORAL at 22:31

## 2025-03-18 RX ADMIN — Medication 2 MILLIGRAM(S): at 18:12

## 2025-03-18 RX ADMIN — Medication 400 MILLIGRAM(S): at 08:46

## 2025-03-18 RX ADMIN — Medication 2 MILLIGRAM(S): at 13:07

## 2025-03-18 RX ADMIN — Medication 40 MILLIGRAM(S): at 22:31

## 2025-03-18 RX ADMIN — Medication 75 MILLILITER(S): at 18:36

## 2025-03-18 RX ADMIN — Medication 2 MILLIGRAM(S): at 23:25

## 2025-03-18 RX ADMIN — CEFTRIAXONE 1000 MILLIGRAM(S): 500 INJECTION, POWDER, FOR SOLUTION INTRAMUSCULAR; INTRAVENOUS at 11:28

## 2025-03-18 RX ADMIN — Medication 20 MILLIGRAM(S): at 22:30

## 2025-03-18 RX ADMIN — METOPROLOL SUCCINATE 100 MILLIGRAM(S): 50 TABLET, EXTENDED RELEASE ORAL at 16:34

## 2025-03-18 RX ADMIN — Medication 2000 MILLILITER(S): at 08:33

## 2025-03-18 RX ADMIN — ENOXAPARIN SODIUM 40 MILLIGRAM(S): 100 INJECTION SUBCUTANEOUS at 22:30

## 2025-03-18 RX ADMIN — ROSUVASTATIN CALCIUM 10 MILLIGRAM(S): 5 TABLET, FILM COATED ORAL at 22:30

## 2025-03-18 RX ADMIN — Medication 4 MILLIGRAM(S): at 08:46

## 2025-03-18 NOTE — ED ADULT TRIAGE NOTE - PAIN: PRESENCE, MLM
Request for dental clearance forwarded to provider for approval per patient request through Nurse Navigator
complains of pain/discomfort

## 2025-03-18 NOTE — PATIENT PROFILE ADULT - FUNCTIONAL ASSESSMENT - BASIC MOBILITY 3.
spoon/fed by clinician fed by clinician cup/spoon/fed by clinician cup/fed by clinician 3 = A little assistance

## 2025-03-18 NOTE — PHARMACOTHERAPY INTERVENTION NOTE - COMMENTS
Medication reconciliation completed.  Pt unable to provide medication history and pt's emergency contact is pt's  who has demetia, current med list taken from Dr. Velez [facility] profile.

## 2025-03-18 NOTE — H&P ADULT - NSICDXPASTMEDICALHX_GEN_ALL_CORE_FT
PAST MEDICAL HISTORY:  History of duodenal ulcer     History of high cholesterol     HTN (hypertension)     Lumbar compression fracture

## 2025-03-18 NOTE — H&P ADULT - HISTORY OF PRESENT ILLNESS
79 y/o F with PMHx of HTN, HLD, alcohol use disorder, GERD, PUD, hx thoracic/lumbar compression deformities s/p surgery, presented to  ED after mechanical fall this AM. Pt reports she was in her recliner, attempted to get up and fell to the ground. Denies HS or LOC. After fall patient reporting right sided hip pain and difficulty ambulating, thus prompting coming to ED for further evaluation. Pt endorses drinking 3 glasses of wine daily, last drink yesterday evening PTA, though patient appears to not be best historian and initially was lying to emergency room staff about alcohol use. Did not take home medications prior to coming to hospital. She otherwise denies chest pain, SOB, f/c/n/v/d, abd pain, LE edema, dysuria, HA, dizziness.     In ED, Tmax 100.1F,  - 119, /81, SpO2 normal on room air. CT head, CXR, Right Hip Xray, CT bony pelvis all negative for acute pathology. EKG sinus tachycardia, 110 BPM. UA with mod LE, 5 WBCs, many bacteria. Labs notable for Hgb 10.2, plts 116, , ALT 98, alcohol serum not obtained. Pt received IV CTX x1, 1L IVF, and IV ativan 2mg x1 for concern of alcohol withdrawal. Admitted to med/surg for further management.

## 2025-03-18 NOTE — ED ADULT TRIAGE NOTE - CHIEF COMPLAINT QUOTE
patient brought in by EMS from home s/p fall out of recliner chair last night.  was on floor since approx 7 pm last night.  lives at home with  who has dementia.  c/o right hip pain.  denies head strike.  denies LOC.  no anticoagulation use.

## 2025-03-18 NOTE — ED ADULT NURSE NOTE - OBJECTIVE STATEMENT
Pt coming from home by EMS. Pt fell last night off her recliner onto the floor. Pt noted she was on the floor for hours due to her  being unable to assist her up. Pt pressed her life alert button. Upon arrival patient changed into a gown and placed onto a bedpan. Pt then cleaned and changed into a diaper, prima fit applied. Pt belongings behind the stretcher. Pt has IV access, EKG completed and placed onto the cardiac monitor. Side rails up and call bell light within reach.

## 2025-03-18 NOTE — ED ADULT NURSE NOTE - NSFALLHARMRISKINTERV_ED_ALL_ED
Assistance OOB with selected safe patient handling equipment if applicable/Communicate risk of Fall with Harm to all staff, patient, and family/Provide visual cue: red socks, yellow wristband, yellow gown, etc/Reinforce activity limits and safety measures with patient and family/Bed in lowest position, wheels locked, appropriate side rails in place/Call bell, personal items and telephone in reach/Instruct patient to call for assistance before getting out of bed/chair/stretcher/Non-slip footwear applied when patient is off stretcher/[place] to call system/Physically safe environment - no spills, clutter or unnecessary equipment/Purposeful Proactive Rounding/Room/bathroom lighting operational, light cord in reach

## 2025-03-18 NOTE — H&P ADULT - ASSESSMENT
81 y/o F with PMHx of HTN, HLD, alcohol use disorder, GERD, PUD, hx thoracic/lumbar compression deformities s/p surgery, presented to  ED after mechanical fall this AM. Pt reports she was in her recliner, attempted to get up and fell to the ground. Denies HS or LOC. After fall patient reporting right sided hip pain and difficulty ambulating, thus prompting coming to ED for further evaluation. Pt endorses drinking 3 glasses of wine daily, last drink yesterday evening PTA, though patient appears to not be best historian and initially was lying to emergency room staff about alcohol use. Admitted for:     #S/p mechanical fall, difficulty ambulating   #Possible UTI   - admit to med/surg  - CT head negative for acute pathology  - CXR, R. hip Xray and CT bony pelvis negative   - UA with mod LE, 5 WBCs, many bacteria   - Started on IV CTX in ER, continue x3 days    - F/u BCx and UCx   - s/p 1L IVF, continue NS @ 75ml/hr   - PT eval   - pain control PRN     #Alcohol use disorder with acute withdrawal   - endorses 3 glasses of wine daily, possibly contributed to fall, last drink yesterday evening 3/17  - CIWA protocol  - Start PO ativan taper   - Thiamine/folic acid/MV  - IVF as above  - social work consult     #Sinus tachycardia  #HTN/HLD  - -119 in ED  - Resumed Metoprolol, losartan, statin     #Mild transaminitis   #Anemia, thrombocytopenia   - Hgb 10.2, plts 116, , ALT 98 -- likely related to Etoh use   - trend     #GERD, PUD  - continue pepcid + PPI     #DVT ppx  - lovenox

## 2025-03-18 NOTE — ED PROVIDER NOTE - CLINICAL SUMMARY MEDICAL DECISION MAKING FREE TEXT BOX
pt with fall right hip pain will get labs, ct head and xray right hip r/o fx and ich    Dr Mead requesting ct right hip to determine fx    ct negative, family at bedside now telling us of alcohol use ciwa 4, ativan given

## 2025-03-18 NOTE — H&P ADULT - NSHPPHYSICALEXAM_GEN_ALL_CORE
Constitutional: NAD, awake, deconditioned appearing, slightly lethargic   HEENT: PERRLA, EOMI, dry MM  Respiratory: Breath sounds are clear bilaterally, No wheezing, rales or rhonchi  Cardiovascular: S1 and S2, RRR, no murmurs, gallops or rubs  Gastrointestinal: +BS, soft, non-tender, non-distended, no CVA tenderness  Extremities: No peripheral edema, +DP pulses b/l  Neurological: A&O x 3, no focal deficits  Musculoskeletal: 5/5 strength b/l upper and lower extremities  Skin: Normal, skin warm and dry

## 2025-03-18 NOTE — ED ADULT NURSE NOTE - PRIMARY CARE PROVIDER
Additional Area 1 Units: 0
Show Levator Superior Units: Yes
Show Lcl Units: No
Lot #: [id number]
Price (Use Numbers Only, No Special Characters Or $): 210.00
Consent: Written consent obtained. Risks include but not limited to lid/brow ptosis, bruising, swelling, diplopia, temporary effect, incomplete chemical denervation.
Dilution (U/0.1 Cc): 4
See MD note
Post-Care Instructions: Patient instructed to not lie down for 4 hours and limit physical activity for 24 hours.
Forehead Units: 10
Additional Area 1 Location: periocular
Detail Level: Zone
Glabellar Complex Units: 5
Incrementing Botox Units: By 0.1 Units
Expiration Date (Month Year): 05/2024
Show Inventory Tab: Hide

## 2025-03-18 NOTE — H&P ADULT - NSHPSOCIALHISTORY_GEN_ALL_CORE
Denies smoking or drug use, endorses drink 3 glasses of wine daily   Lives at home, primary care giver of

## 2025-03-18 NOTE — ED PROVIDER NOTE - PHYSICAL EXAMINATION
Gen:  elderly appearing in NAD  Head:  NC/AT  HEENT: pupils perrl,no pharyngeal erythema, uvula midline  Cardiac: S1S2, RRR  Abd: Soft, non tender  Resp: No distress, CTA   musculoskeletal:: no deformities, no swelling, strength +5/+5, from no ttp over right trochanter  Skin: warm and dry as visualized, no rashes  Neuro: BERMAN, aao x 4, tremulous  Psych:alert, cooperative, appropriate mood and affect for situation

## 2025-03-18 NOTE — ED PROVIDER NOTE - WET READ LAUNCH FT
CONSULTATION    REFERRING PROVIDER:  Sindi Pike DO    HISTORY OF PRESENT ILLNESS:  This patient is seen at the request of Sindi Pike DO for enlarging left breast fibroadenoma. Biopsy in 2017, with significant growth, excision recommended. No family history of breast or ovarian cancer.    Chief Complaint   Patient presents with   • New Patient     BENIGN MASS L BREAST GROWING   • Consultation   • Breast Mass   .        GENERAL BREAST HISTORY:  Age at menarche: 13  Last menstrual period: unknown has been on OCP for past 20 yrs and has not gotten period  : 3. Para: 2  Age at first completed pregnancy: 29.  Breast-feeding history: y.  History of oral contraceptives: y current.  History of hormone replacement therapy or fertility assistance: n.  Previous Breast Biopsies or Surgeries: y per hpi  Ethnicity:  .    PERTINENT BREAST IMAGING:  I personally reviewed the relevant images for this patient and concur with the radiologist's impression.    [unfilled]      PERTINENT PATHOLOGY:  I personally reviewed the relevant pathology for this patient.      PAST MEDICAL HISTORY:  Past Surgical History:   Procedure Laterality Date   • Anes arthroscopy knee,surg      secondary to BL knee dislocations   • Breast biopsy Left 2017    benign   • Breast lumpectomy Left     benign (was done d/t inconclusive bx)   • Lasik Bilateral    • Tonsillectomy  child     Past Medical History:   Diagnosis Date   • Hyperlipidemia    • Seasonal allergies        Current Outpatient Medications   Medication Sig Dispense Refill   • fenofibrate (TRICOR) 48 MG tablet Take 2 tablets by mouth daily. 180 tablet 3   • metFORMIN (GLUCOPHAGE) 500 MG tablet Take 1 tablet by mouth daily (with breakfast). 90 tablet 3   • levothyroxine 25 MCG tablet TAKE 1 TABLET BY MOUTH DAILY 90 tablet 1   • atorvastatin (LIPITOR) 80 MG tablet TAKE 1 TABLET BY MOUTH DAILY 90 tablet 1   • VIORELE 0.15-0.02/0.01 MG () per tablet TAKE 1  TABLET BY MOUTH DAILY 28 tablet 2   • Cetirizine HCl (ZYRTEC ALLERGY) 10 MG Cap Take 10 mg by mouth daily.     • aspirin 81 MG tablet        No current facility-administered medications for this visit.        ALLERGIES:   Allergen Reactions   • Hydrocodone-Acetaminophen INSOMNIA       Social History     Tobacco Use   • Smoking status: Former Smoker     Packs/day: 0.50     Years: 25.00     Pack years: 12.50     Quit date:      Years since quittin.7   • Smokeless tobacco: Never Used   Substance Use Topics   • Alcohol use: Yes     Frequency: Monthly or less     Drinks per session: 1 or 2     Binge frequency: Never     Comment: once or twice a year   • Drug use: Never       Family History   Problem Relation Age of Onset   • Hypertension Mother    • Congestive Heart Failure Mother    • Diabetes Mother    • Hyperlipidemia Mother    • Osteoarthritis Mother    • Cancer, Colon Mother    • Congestive Heart Failure Father    • Hypertension Father    • Hyperlipidemia Father    • Leukemia Father    • Atrial Fibrilliation Father    • Other Father         cardiac stents   • Cancer Father    • Heart disease Father    • Hypertension Sister    • Hypertension Brother    • Hyperlipidemia Brother    • Other Brother         gastric ulcers   • Stroke Maternal Grandmother          REVIEW OF SYSTEMS:  Review of Systems   Constitutional: Negative.    HENT: Negative.    Eyes: Negative.    Respiratory: Negative.    Cardiovascular: Negative.    Gastrointestinal: Negative.    Endocrine:        DM, controlled   Genitourinary: Negative.    Musculoskeletal: Positive for arthralgias.   Skin: Negative.    Neurological: Negative.    Hematological: Negative.    Psychiatric/Behavioral: Negative.    Breast: per hpi    PHYSICAL EXAMINATION:  Physical Exam  Constitutional:       Appearance: Normal appearance.   HENT:      Right Ear: External ear normal.      Left Ear: External ear normal.      Nose: Nose normal.   Eyes:      Conjunctiva/sclera:  Conjunctivae normal.   Neck:      Musculoskeletal: Normal range of motion.   Cardiovascular:      Rate and Rhythm: Regular rhythm.   Pulmonary:      Effort: Pulmonary effort is normal.      Breath sounds: Normal breath sounds.   Chest:      Breasts:         Right: No inverted nipple, mass, nipple discharge or skin change.         Left: Mass present. No inverted nipple, nipple discharge or skin change.       Musculoskeletal: Normal range of motion.   Lymphadenopathy:      Cervical: No cervical adenopathy.      Upper Body:      Right upper body: No supraclavicular or axillary adenopathy.      Left upper body: No supraclavicular or axillary adenopathy.   Skin:     General: Skin is warm and dry.   Neurological:      General: No focal deficit present.      Mental Status: She is alert and oriented to person, place, and time.   Psychiatric:         Mood and Affect: Mood normal.         Behavior: Behavior normal.         IMPRESSION:  Enlarging left fibroadenoma    RECOMMENDATIONS:  rec excision, she agrees, surgery discussed in detail. Will schedule. All questions answered    The patient indicated understanding of the diagnosis and agreed with the plan of care. They are encouraged to call our office with any additional questions or concerns.    Instructions provided as documented in the After Visit Summary.    I wish to thank Sindi Pike DO for the privilege of being able to participate in the evaluation and care of this patient.    Radha Lisa MD  Date of Surgery: tbd      CC: Sindi Pike DO   There are no Wet Read(s) to document.

## 2025-03-18 NOTE — PATIENT PROFILE ADULT - VISION (WITH CORRECTIVE LENSES IF THE PATIENT USUALLY WEARS THEM):
pt wears contact lenses and eye glasses/Partially impaired: cannot see medication labels or newsprint, but can see obstacles in path, and the surrounding layout; can count fingers at arm's length

## 2025-03-18 NOTE — ED PROVIDER NOTE - PROGRESS NOTE DETAILS
Patient with Patient with daily alcohol intake of 3 ganga, last drink 3 days ago with tremors today, ct hip shows no fx, unable to ambulate will admit ra Chicas DO

## 2025-03-18 NOTE — H&P ADULT - NSHPLABSRESULTS_GEN_ALL_CORE
03-18-25 @ 08:30  Glucose 88 [70 - 99]  CO2 total 22 [22 - 31]  Chloride 101 [96 - 108]  Sodium 136 [135 - 145]  Potassium 4.8 [3.5 - 5.3]  Calcium 9.0 [8.5 - 10.1]  Creatinine 0.92 [0.50 - 1.30]  BUN 15 [7 - 23]  eGFR 63  Anion gap 13 [5 - 17]   [15 - 37]  ALT 98 [12 - 78]  Alk phos 111 [40 - 120]  Albumin 3.5 [3.3 - 5.0]    WBC 5.96 [3.80 - 10.50]  Hemoglobin 10.2 [11.5 - 15.5]  Hematocrit 30.0 [34.5 - 45.0]  Platelets 116 [150 - 400]    < from: CT Head No Cont (03.18.25 @ 09:18) >    IMPRESSION:    No significant interval change    No acute intracranial  hemorrhage, midline shift or calvarial fracture.    Moderate white matter small vessel ischemicdisease.    < end of copied text >    < from: CT Pelvis Bony Only No Cont (03.18.25 @ 13:36) >    IMPRESSION: No acute fracture or dislocation.    < end of copied text >    < from: Xray Femur 2 Views, Right (03.18.25 @ 09:15) >    IMPRESSION:    At APpelvis and right hip: No acute fracture or dislocation. Some   sclerotic changes related to the greater trochanteric region bilaterally.   Follow-up suggested as indicated clinically    Right femur: no acute fracture or dislocation. Some sclerotic changes   related to the greater trochanteric region. Follow-up suggested as   indicated clinically.    CHEST: No focal infiltrate or congestion. Some elevation of the left   hemidiaphragm noted. Regional osseous structures appropriate for age.   Follow-up suggested as indicated clinically.    < end of copied text >

## 2025-03-18 NOTE — H&P ADULT - NSHPROSALLOTHERNEGRD_GEN_ALL_CORE
From: Yohan Ch  To: Kj Hitchcock MD  Sent: 3/6/2019 7:23 PM CST  Subject: Non-Urgent Medical Question    Hi,    I am now seeing Dr. Hudson so you can put her as my Primary so my prescription refills go to her.    Thanks!    Gordo Ch   All other review of systems negative, except as noted in HPI

## 2025-03-19 LAB
ALBUMIN SERPL ELPH-MCNC: 2.9 G/DL — LOW (ref 3.3–5)
ALBUMIN SERPL ELPH-MCNC: 2.9 G/DL — LOW (ref 3.3–5)
ALP SERPL-CCNC: 100 U/L — SIGNIFICANT CHANGE UP (ref 40–120)
ALP SERPL-CCNC: 104 U/L — SIGNIFICANT CHANGE UP (ref 40–120)
ALT FLD-CCNC: 72 U/L — SIGNIFICANT CHANGE UP (ref 12–78)
ALT FLD-CCNC: 73 U/L — SIGNIFICANT CHANGE UP (ref 12–78)
AMMONIA BLD-MCNC: 40 UMOL/L — HIGH (ref 11–32)
ANION GAP SERPL CALC-SCNC: 6 MMOL/L — SIGNIFICANT CHANGE UP (ref 5–17)
ANION GAP SERPL CALC-SCNC: 7 MMOL/L — SIGNIFICANT CHANGE UP (ref 5–17)
ANION GAP SERPL CALC-SCNC: 7 MMOL/L — SIGNIFICANT CHANGE UP (ref 5–17)
AST SERPL-CCNC: 69 U/L — HIGH (ref 15–37)
AST SERPL-CCNC: 71 U/L — HIGH (ref 15–37)
BILIRUB DIRECT SERPL-MCNC: 0.3 MG/DL — SIGNIFICANT CHANGE UP (ref 0–0.3)
BILIRUB INDIRECT FLD-MCNC: 0.5 MG/DL — SIGNIFICANT CHANGE UP (ref 0.2–1)
BILIRUB SERPL-MCNC: 0.8 MG/DL — SIGNIFICANT CHANGE UP (ref 0.2–1.2)
BILIRUB SERPL-MCNC: 1 MG/DL — SIGNIFICANT CHANGE UP (ref 0.2–1.2)
BUN SERPL-MCNC: 13 MG/DL — SIGNIFICANT CHANGE UP (ref 7–23)
BUN SERPL-MCNC: 13 MG/DL — SIGNIFICANT CHANGE UP (ref 7–23)
BUN SERPL-MCNC: 14 MG/DL — SIGNIFICANT CHANGE UP (ref 7–23)
CALCIUM SERPL-MCNC: 7.7 MG/DL — LOW (ref 8.5–10.1)
CALCIUM SERPL-MCNC: 7.7 MG/DL — LOW (ref 8.5–10.1)
CALCIUM SERPL-MCNC: 7.9 MG/DL — LOW (ref 8.5–10.1)
CHLORIDE SERPL-SCNC: 104 MMOL/L — SIGNIFICANT CHANGE UP (ref 96–108)
CHLORIDE SERPL-SCNC: 104 MMOL/L — SIGNIFICANT CHANGE UP (ref 96–108)
CHLORIDE SERPL-SCNC: 105 MMOL/L — SIGNIFICANT CHANGE UP (ref 96–108)
CO2 SERPL-SCNC: 24 MMOL/L — SIGNIFICANT CHANGE UP (ref 22–31)
CO2 SERPL-SCNC: 26 MMOL/L — SIGNIFICANT CHANGE UP (ref 22–31)
CO2 SERPL-SCNC: 26 MMOL/L — SIGNIFICANT CHANGE UP (ref 22–31)
CREAT SERPL-MCNC: 0.8 MG/DL — SIGNIFICANT CHANGE UP (ref 0.5–1.3)
CREAT SERPL-MCNC: 0.84 MG/DL — SIGNIFICANT CHANGE UP (ref 0.5–1.3)
CREAT SERPL-MCNC: 0.9 MG/DL — SIGNIFICANT CHANGE UP (ref 0.5–1.3)
CULTURE RESULTS: SIGNIFICANT CHANGE UP
EGFR: 65 ML/MIN/1.73M2 — SIGNIFICANT CHANGE UP
EGFR: 65 ML/MIN/1.73M2 — SIGNIFICANT CHANGE UP
EGFR: 71 ML/MIN/1.73M2 — SIGNIFICANT CHANGE UP
EGFR: 71 ML/MIN/1.73M2 — SIGNIFICANT CHANGE UP
EGFR: 75 ML/MIN/1.73M2 — SIGNIFICANT CHANGE UP
EGFR: 75 ML/MIN/1.73M2 — SIGNIFICANT CHANGE UP
GLUCOSE SERPL-MCNC: 89 MG/DL — SIGNIFICANT CHANGE UP (ref 70–99)
GLUCOSE SERPL-MCNC: 93 MG/DL — SIGNIFICANT CHANGE UP (ref 70–99)
GLUCOSE SERPL-MCNC: 96 MG/DL — SIGNIFICANT CHANGE UP (ref 70–99)
HCT VFR BLD CALC: 25.4 % — LOW (ref 34.5–45)
HCT VFR BLD CALC: 26.3 % — LOW (ref 34.5–45)
HGB BLD-MCNC: 8.5 G/DL — LOW (ref 11.5–15.5)
HGB BLD-MCNC: 8.8 G/DL — LOW (ref 11.5–15.5)
IMMATURE PLATELET FRACTION #: 4 K/UL — LOW (ref 4.7–11.1)
IMMATURE PLATELET FRACTION #: 4.1 K/UL — LOW (ref 4.7–11.1)
IMMATURE PLATELET FRACTION %: 4.6 % — SIGNIFICANT CHANGE UP (ref 1.6–4.9)
IMMATURE PLATELET FRACTION %: 5.2 % — HIGH (ref 1.6–4.9)
MAGNESIUM SERPL-MCNC: 1.7 MG/DL — SIGNIFICANT CHANGE UP (ref 1.6–2.6)
MAGNESIUM SERPL-MCNC: 1.8 MG/DL — SIGNIFICANT CHANGE UP (ref 1.6–2.6)
MCHC RBC-ENTMCNC: 33.5 G/DL — SIGNIFICANT CHANGE UP (ref 32–36)
MCHC RBC-ENTMCNC: 33.5 G/DL — SIGNIFICANT CHANGE UP (ref 32–36)
MCHC RBC-ENTMCNC: 33.8 PG — SIGNIFICANT CHANGE UP (ref 27–34)
MCHC RBC-ENTMCNC: 34.1 PG — HIGH (ref 27–34)
MCV RBC AUTO: 101.2 FL — HIGH (ref 80–100)
MCV RBC AUTO: 102 FL — HIGH (ref 80–100)
NRBC # BLD AUTO: 0 K/UL — SIGNIFICANT CHANGE UP (ref 0–0)
NRBC # BLD AUTO: 0 K/UL — SIGNIFICANT CHANGE UP (ref 0–0)
NRBC # FLD: 0 K/UL — SIGNIFICANT CHANGE UP (ref 0–0)
NRBC # FLD: 0 K/UL — SIGNIFICANT CHANGE UP (ref 0–0)
NRBC BLD AUTO-RTO: 0 /100 WBCS — SIGNIFICANT CHANGE UP (ref 0–0)
NRBC BLD AUTO-RTO: 0 /100 WBCS — SIGNIFICANT CHANGE UP (ref 0–0)
PHOSPHATE SERPL-MCNC: 2.3 MG/DL — LOW (ref 2.5–4.5)
PHOSPHATE SERPL-MCNC: 2.5 MG/DL — SIGNIFICANT CHANGE UP (ref 2.5–4.5)
PLATELET # BLD AUTO: 79 K/UL — LOW (ref 150–400)
PLATELET # BLD AUTO: 87 K/UL — LOW (ref 150–400)
PMV BLD: 11.4 FL — SIGNIFICANT CHANGE UP (ref 7–13)
PMV BLD: 11.4 FL — SIGNIFICANT CHANGE UP (ref 7–13)
POTASSIUM SERPL-MCNC: 3.4 MMOL/L — LOW (ref 3.5–5.3)
POTASSIUM SERPL-MCNC: 3.5 MMOL/L — SIGNIFICANT CHANGE UP (ref 3.5–5.3)
POTASSIUM SERPL-MCNC: 3.5 MMOL/L — SIGNIFICANT CHANGE UP (ref 3.5–5.3)
POTASSIUM SERPL-SCNC: 3.4 MMOL/L — LOW (ref 3.5–5.3)
POTASSIUM SERPL-SCNC: 3.5 MMOL/L — SIGNIFICANT CHANGE UP (ref 3.5–5.3)
POTASSIUM SERPL-SCNC: 3.5 MMOL/L — SIGNIFICANT CHANGE UP (ref 3.5–5.3)
PROT SERPL-MCNC: 5.7 GM/DL — LOW (ref 6–8.3)
PROT SERPL-MCNC: 6.1 GM/DL — SIGNIFICANT CHANGE UP (ref 6–8.3)
RBC # BLD: 2.49 M/UL — LOW (ref 3.8–5.2)
RBC # BLD: 2.6 M/UL — LOW (ref 3.8–5.2)
RBC # FLD: 14.6 % — HIGH (ref 10.3–14.5)
RBC # FLD: 14.6 % — HIGH (ref 10.3–14.5)
SODIUM SERPL-SCNC: 136 MMOL/L — SIGNIFICANT CHANGE UP (ref 135–145)
SODIUM SERPL-SCNC: 136 MMOL/L — SIGNIFICANT CHANGE UP (ref 135–145)
SODIUM SERPL-SCNC: 137 MMOL/L — SIGNIFICANT CHANGE UP (ref 135–145)
SPECIMEN SOURCE: SIGNIFICANT CHANGE UP
WBC # BLD: 3.77 K/UL — LOW (ref 3.8–10.5)
WBC # BLD: 4.25 K/UL — SIGNIFICANT CHANGE UP (ref 3.8–10.5)
WBC # FLD AUTO: 3.77 K/UL — LOW (ref 3.8–10.5)
WBC # FLD AUTO: 4.25 K/UL — SIGNIFICANT CHANGE UP (ref 3.8–10.5)

## 2025-03-19 PROCEDURE — 99233 SBSQ HOSP IP/OBS HIGH 50: CPT

## 2025-03-19 RX ORDER — LORAZEPAM 4 MG/ML
2 VIAL (ML) INJECTION
Refills: 0 | Status: DISCONTINUED | OUTPATIENT
Start: 2025-03-19 | End: 2025-03-22

## 2025-03-19 RX ORDER — LORAZEPAM 4 MG/ML
4 VIAL (ML) INJECTION EVERY 6 HOURS
Refills: 0 | Status: DISCONTINUED | OUTPATIENT
Start: 2025-03-19 | End: 2025-03-20

## 2025-03-19 RX ORDER — LORAZEPAM 4 MG/ML
3 VIAL (ML) INJECTION EVERY 6 HOURS
Refills: 0 | Status: DISCONTINUED | OUTPATIENT
Start: 2025-03-20 | End: 2025-03-20

## 2025-03-19 RX ORDER — LORAZEPAM 4 MG/ML
VIAL (ML) INJECTION
Refills: 0 | Status: DISCONTINUED | OUTPATIENT
Start: 2025-03-19 | End: 2025-03-20

## 2025-03-19 RX ADMIN — Medication 20 MILLIGRAM(S): at 21:06

## 2025-03-19 RX ADMIN — Medication 2 MILLIGRAM(S): at 11:27

## 2025-03-19 RX ADMIN — Medication 2 MILLIGRAM(S): at 22:44

## 2025-03-19 RX ADMIN — Medication 40 MILLIGRAM(S): at 15:03

## 2025-03-19 RX ADMIN — Medication 2 MILLIGRAM(S): at 05:37

## 2025-03-19 RX ADMIN — Medication 2 MILLIGRAM(S): at 15:00

## 2025-03-19 RX ADMIN — LOSARTAN POTASSIUM 100 MILLIGRAM(S): 100 TABLET, FILM COATED ORAL at 05:37

## 2025-03-19 RX ADMIN — Medication 2 MILLIGRAM(S): at 20:42

## 2025-03-19 RX ADMIN — METOPROLOL SUCCINATE 50 MILLIGRAM(S): 50 TABLET, EXTENDED RELEASE ORAL at 21:06

## 2025-03-19 RX ADMIN — CEFTRIAXONE 1000 MILLIGRAM(S): 500 INJECTION, POWDER, FOR SOLUTION INTRAMUSCULAR; INTRAVENOUS at 11:26

## 2025-03-19 RX ADMIN — Medication 2 MILLIGRAM(S): at 18:41

## 2025-03-19 RX ADMIN — ROSUVASTATIN CALCIUM 10 MILLIGRAM(S): 5 TABLET, FILM COATED ORAL at 21:07

## 2025-03-19 RX ADMIN — Medication 75 MILLILITER(S): at 11:34

## 2025-03-19 NOTE — PROGRESS NOTE ADULT - ASSESSMENT
81 y/o F with PMHx of HTN, HLD, alcohol use disorder, GERD, PUD, hx thoracic/lumbar compression deformities s/p surgery, presented to  ED after mechanical fall this AM. Pt reports she was in her recliner, attempted to get up and fell to the ground. Denies HS or LOC. After fall patient reporting right sided hip pain and difficulty ambulating, thus prompting coming to ED for further evaluation. Pt endorses drinking 3 glasses of wine daily, last drink yesterday evening PTA, though patient appears to not be best historian and initially was lying to emergency room staff about alcohol use. Admitted for:     #S/p mechanical fall, difficulty ambulating   #Possible UTI   -CT head negative for acute pathology  -CXR, R. hip Xray and CT bony pelvis negative   -UA with mod LE, 5 WBCs, many bacteria   - on IV CTX in ER, continue x3 days    -F/u BCx and UCx   -s/p 1L IVF, continue NS @ 75ml/hr   -PT eval   -pain control PRN     #Alcohol use disorder with acute withdrawal   -endorses 3 glasses of wine daily, possibly contributed to fall, last drink yesterday evening 3/17  -CIWA protocol  -High dose CIWA protocol  -Thiamine/folic acid/MV  -IVF as above  -social work consult     #Sinus tachycardia  #HTN/HLD  -likely secondary to dehydration   -Metoprolol, losartan, statin     #Anemia, Hx of PUD, GERD  -unsure if pt is having any melena, denies hematochezia  -f/u on iron studies, folate and vitamin b12   -f/u on occult   -IV PPI for now   -Continue with H2 blocker    #Thrombocytopenia  -Likely secondary to EtOH use  -Monitor and trend    #Mild transaminitis  -Monitor and trend  - trend     #VTE prophylaxis  -SCDs

## 2025-03-19 NOTE — PHYSICAL THERAPY INITIAL EVALUATION ADULT - GENERAL OBSERVATIONS, REHAB EVAL
Pt seen on 3E, A and O to name, , situation, month but not year. Pt has one hearing aide in , [place], and hypophonic this session. Son in Law and  present at end of session, Son in law stated this is not like her. Pt was willing and cooperative but very weak, +Primafit. HR 70 to 88

## 2025-03-19 NOTE — PROGRESS NOTE ADULT - SUBJECTIVE AND OBJECTIVE BOX
HOSPITALIST ATTENDING PROGRESS NOTE     Chart and meds reviewed. Patient seen and examined     Interval Hx/Events: Pt seen and evaluated. Has no acute complaints. Denies any abdominal pain. Unsure if she having melena. No other acute complaints.     All other systems and founds to be negative with exception of what has been described above.     PHYSICAL EXAM:  Vital Signs Last 24 Hrs  T(C): 36.8 (19 Mar 2025 12:05), Max: 37.3 (18 Mar 2025 18:35)  T(F): 98.3 (19 Mar 2025 12:05), Max: 99.2 (18 Mar 2025 18:35)  HR: 66 (19 Mar 2025 12:05) (63 - 113)  BP: 145/57 (19 Mar 2025 12:05) (125/69 - 155/71)  BP(mean): 112 (18 Mar 2025 23:25) (90 - 112)  RR: 18 (19 Mar 2025 12:05) (18 - 20)  SpO2: 99% (19 Mar 2025 12:05) (95% - 100%)    Parameters below as of 19 Mar 2025 12:05  Patient On (Oxygen Delivery Method): room air      Daily     Daily Weight in k.3 (18 Mar 2025 18:35)    GEN: NAD   HEENT: EOMI,  +dry MM  NECK : Soft and supple, no JVD  LUNG: CTABL, No wheezing, rales or rhonchi  CVS: S1S2+, RRR, no M/G/R  GI: BS+, soft, NT/ND, no guarding, no rebound  EXTREMITIES: No peripheral edema  VASCULAR: 2+ peripheral pulses  NEURO: AAOx2-3, grossly non-focal   SKIN: No rashes    HOME MEDICATIONS:  Home Medications:  famotidine 40 mg oral tablet: 1 tab(s) orally once a day (18 Mar 2025 15:23)  metoprolol succinate 100 mg oral tablet, extended release: 1 tab(s) orally once a day (in the morning) (18 Mar 2025 14:49)  metoprolol succinate 50 mg oral tablet, extended release: 1 tab(s) orally once a day (in the evening) (18 Mar 2025 14:49)  olmesartan 40 mg oral tablet: 1 tab(s) orally once a day (18 Mar 2025 14:49)  rosuvastatin 10 mg oral tablet: 1 tab(s) orally once a day (18 Mar 2025 14:49)  Vitamin D3 25 mcg (1000 intl units) oral tablet: 4 tab(s) orally once a day (18 Mar 2025 14:49)  Zetia 10 mg oral tablet: 1 tab(s) orally once a day (18 Mar 2025 14:49)      MEDICATIONS  MEDICATIONS  (STANDING):  cefTRIAXone Injectable. 1000 milliGRAM(s) IV Push every 24 hours  ezetimibe 10 milliGRAM(s) Oral daily  famotidine    Tablet 20 milliGRAM(s) Oral two times a day  folic acid 1 milliGRAM(s) Oral daily  LORazepam   Injectable   IV Push   LORazepam   Injectable 4 milliGRAM(s) IV Push every 6 hours  losartan 100 milliGRAM(s) Oral daily  metoprolol succinate  milliGRAM(s) Oral daily  metoprolol succinate ER 50 milliGRAM(s) Oral at bedtime  multivitamin/minerals 1 Tablet(s) Oral daily  pantoprazole  Injectable 40 milliGRAM(s) IV Push two times a day  rosuvastatin 10 milliGRAM(s) Oral at bedtime  sodium chloride 0.9%. 1000 milliLiter(s) (75 mL/Hr) IV Continuous <Continuous>  thiamine 100 milliGRAM(s) Oral daily      LABS: All Labs Reviewed:                        8.8    4.25  )-----------( 87       ( 19 Mar 2025 06:47 )             26.3         136  |  104  |  13  ----------------------------<  96  3.5   |  26  |  0.84    Ca    7.9[L]      19 Mar 2025 12:37  Phos  2.5       Mg     1.7         TPro  5.7[L]  /  Alb  2.9[L]  /  TBili  1.0  /  DBili  x   /  AST  69[H]  /  ALT  73  /  AlkPhos  104  -    PT/INR - ( 18 Mar 2025 08:30 )   PT: 10.7 sec;   INR: 0.91 ratio         PTT - ( 18 Mar 2025 08:30 )  PTT:29.9 sec    Urinalysis Basic - ( 19 Mar 2025 12:37 )    Color: x / Appearance: x / SG: x / pH: x  Gluc: 96 mg/dL / Ketone: x  / Bili: x / Urobili: x   Blood: x / Protein: x / Nitrite: x   Leuk Esterase: x / RBC: x / WBC x   Sq Epi: x / Non Sq Epi: x / Bacteria: x        Urinalysis with Rflx Culture (collected 18 Mar 2025 08:30)      Blood Culture:   I&O's Detail    18 Mar 2025 07:01  -  19 Mar 2025 07:00  --------------------------------------------------------  IN:  Total IN: 0 mL    OUT:    Voided (mL): 650 mL  Total OUT: 650 mL    Total NET: -650 mL      CAPILLARY BLOOD GLUCOSE    CARDIOLOGY TESTING     EKG: reviewed     ECHO       RADIOLOGY: reviewed

## 2025-03-19 NOTE — PHYSICAL THERAPY INITIAL EVALUATION ADULT - DIAGNOSIS, PT EVAL
s/p fall out of a chair, S/p mechanical fall, difficulty ambulating, Possible UTI , Alcohol use disorder with acute withdrawal, Sinus tachycardia

## 2025-03-19 NOTE — PHYSICAL THERAPY INITIAL EVALUATION ADULT - LIVES WITH, PROFILE
Pt lives w/ her  who has dementia, in a home w/ 3 MARQU[place] and a chair lift in the home. Details from son in law.

## 2025-03-19 NOTE — PHYSICAL THERAPY INITIAL EVALUATION ADULT - MODALITIES TREATMENT COMMENTS
Pt returned to supine, not appropriate for a chair today due to limited sitting balance this session. +Primafit, NAD, denies pain, +TELE HR 88, No SOB noted. +alarm, CBIR, family present , RN and CM aware

## 2025-03-19 NOTE — PHYSICAL THERAPY INITIAL EVALUATION ADULT - PERTINENT HX OF CURRENT PROBLEM, REHAB EVAL
79 y/o F, presented to  ED after mechanical fall this AM. Pt reports she was in her recliner, attempted to get up and fell to the ground. Denies HS or LOC. After fall patient reporting right sided hip pain and difficulty ambulating, thus prompting coming to ED for further evaluation.  PMHx of HTN, HLD, alcohol use disorder, GERD, PUD, hx thoracic/lumbar compression deformities s/p surgery

## 2025-03-19 NOTE — PHYSICAL THERAPY INITIAL EVALUATION ADULT - PLANNED THERAPY INTERVENTIONS, PT EVAL
GOAL: Pt will perform 4  with or without U HR as needed within 4weeks./gait training/transfer training

## 2025-03-20 LAB
-  AMIKACIN: SIGNIFICANT CHANGE UP
-  AMPICILLIN/SULBACTAM: SIGNIFICANT CHANGE UP
-  CEFEPIME: SIGNIFICANT CHANGE UP
-  CEFOTAXIME: SIGNIFICANT CHANGE UP
-  CEFTAZIDIME: SIGNIFICANT CHANGE UP
-  CEFTRIAXONE: SIGNIFICANT CHANGE UP
-  CIPROFLOXACIN: SIGNIFICANT CHANGE UP
-  GENTAMICIN: SIGNIFICANT CHANGE UP
-  IMIPENEM: SIGNIFICANT CHANGE UP
-  LEVOFLOXACIN: SIGNIFICANT CHANGE UP
-  MEROPENEM: SIGNIFICANT CHANGE UP
-  MINOCYCLINE: SIGNIFICANT CHANGE UP
-  TOBRAMYCIN: SIGNIFICANT CHANGE UP
-  TRIMETHOPRIM/SULFAMETHOXAZOLE: SIGNIFICANT CHANGE UP
ALBUMIN SERPL ELPH-MCNC: 2.8 G/DL — LOW (ref 3.3–5)
ALP SERPL-CCNC: 91 U/L — SIGNIFICANT CHANGE UP (ref 40–120)
ALT FLD-CCNC: 63 U/L — SIGNIFICANT CHANGE UP (ref 12–78)
ANION GAP SERPL CALC-SCNC: 7 MMOL/L — SIGNIFICANT CHANGE UP (ref 5–17)
AST SERPL-CCNC: 63 U/L — HIGH (ref 15–37)
BILIRUB SERPL-MCNC: 0.8 MG/DL — SIGNIFICANT CHANGE UP (ref 0.2–1.2)
BUN SERPL-MCNC: 14 MG/DL — SIGNIFICANT CHANGE UP (ref 7–23)
CALCIUM SERPL-MCNC: 8.1 MG/DL — LOW (ref 8.5–10.1)
CHLORIDE SERPL-SCNC: 108 MMOL/L — SIGNIFICANT CHANGE UP (ref 96–108)
CO2 SERPL-SCNC: 25 MMOL/L — SIGNIFICANT CHANGE UP (ref 22–31)
CREAT SERPL-MCNC: 0.8 MG/DL — SIGNIFICANT CHANGE UP (ref 0.5–1.3)
EGFR: 75 ML/MIN/1.73M2 — SIGNIFICANT CHANGE UP
EGFR: 75 ML/MIN/1.73M2 — SIGNIFICANT CHANGE UP
FERRITIN SERPL-MCNC: 941 NG/ML — HIGH (ref 13–330)
FOLATE SERPL-MCNC: 3.9 NG/ML — LOW
GLUCOSE SERPL-MCNC: 92 MG/DL — SIGNIFICANT CHANGE UP (ref 70–99)
HCT VFR BLD CALC: 25.5 % — LOW (ref 34.5–45)
HGB BLD-MCNC: 8.4 G/DL — LOW (ref 11.5–15.5)
IMMATURE PLATELET FRACTION #: 5.5 K/UL — SIGNIFICANT CHANGE UP (ref 4.7–11.1)
IMMATURE PLATELET FRACTION %: 7.3 % — HIGH (ref 1.6–4.9)
IRON SATN MFR SERPL: 33 % — SIGNIFICANT CHANGE UP (ref 14–50)
IRON SATN MFR SERPL: 53 UG/DL — SIGNIFICANT CHANGE UP (ref 30–160)
MAGNESIUM SERPL-MCNC: 2 MG/DL — SIGNIFICANT CHANGE UP (ref 1.6–2.6)
MCHC RBC-ENTMCNC: 32.9 G/DL — SIGNIFICANT CHANGE UP (ref 32–36)
MCHC RBC-ENTMCNC: 34.3 PG — HIGH (ref 27–34)
MCV RBC AUTO: 104.1 FL — HIGH (ref 80–100)
METHOD TYPE: SIGNIFICANT CHANGE UP
NRBC # BLD AUTO: 0 K/UL — SIGNIFICANT CHANGE UP (ref 0–0)
NRBC # FLD: 0 K/UL — SIGNIFICANT CHANGE UP (ref 0–0)
NRBC BLD AUTO-RTO: 0 /100 WBCS — SIGNIFICANT CHANGE UP (ref 0–0)
ORGANISM # SPEC MICROSCOPIC CNT: SIGNIFICANT CHANGE UP
ORGANISM # SPEC MICROSCOPIC CNT: SIGNIFICANT CHANGE UP
PHOSPHATE SERPL-MCNC: 2.1 MG/DL — LOW (ref 2.5–4.5)
PLATELET # BLD AUTO: 75 K/UL — LOW (ref 150–400)
PMV BLD: 12.2 FL — SIGNIFICANT CHANGE UP (ref 7–13)
POTASSIUM SERPL-MCNC: 3.1 MMOL/L — LOW (ref 3.5–5.3)
POTASSIUM SERPL-SCNC: 3.1 MMOL/L — LOW (ref 3.5–5.3)
PROT SERPL-MCNC: 5.8 GM/DL — LOW (ref 6–8.3)
RBC # BLD: 2.45 M/UL — LOW (ref 3.8–5.2)
RBC # FLD: 14.5 % — SIGNIFICANT CHANGE UP (ref 10.3–14.5)
SODIUM SERPL-SCNC: 140 MMOL/L — SIGNIFICANT CHANGE UP (ref 135–145)
TIBC SERPL-MCNC: 163 UG/DL — LOW (ref 220–430)
UIBC SERPL-MCNC: 110 UG/DL — SIGNIFICANT CHANGE UP (ref 110–370)
VIT B12 SERPL-MCNC: 1514 PG/ML — HIGH (ref 232–1245)
WBC # BLD: 4 K/UL — SIGNIFICANT CHANGE UP (ref 3.8–10.5)
WBC # FLD AUTO: 4 K/UL — SIGNIFICANT CHANGE UP (ref 3.8–10.5)

## 2025-03-20 PROCEDURE — 76700 US EXAM ABDOM COMPLETE: CPT | Mod: 26

## 2025-03-20 PROCEDURE — 99233 SBSQ HOSP IP/OBS HIGH 50: CPT

## 2025-03-20 RX ORDER — LORAZEPAM 4 MG/ML
VIAL (ML) INJECTION
Refills: 0 | Status: COMPLETED | OUTPATIENT
Start: 2025-03-20 | End: 2025-03-24

## 2025-03-20 RX ORDER — LORAZEPAM 4 MG/ML
0.5 VIAL (ML) INJECTION EVERY 6 HOURS
Refills: 0 | Status: DISCONTINUED | OUTPATIENT
Start: 2025-03-23 | End: 2025-03-24

## 2025-03-20 RX ORDER — LORAZEPAM 4 MG/ML
1.5 VIAL (ML) INJECTION EVERY 6 HOURS
Refills: 0 | Status: DISCONTINUED | OUTPATIENT
Start: 2025-03-21 | End: 2025-03-22

## 2025-03-20 RX ORDER — LORAZEPAM 4 MG/ML
1 VIAL (ML) INJECTION EVERY 6 HOURS
Refills: 0 | Status: DISCONTINUED | OUTPATIENT
Start: 2025-03-22 | End: 2025-03-23

## 2025-03-20 RX ORDER — POTASSIUM PHOSPHATE, MONOBASIC POTASSIUM PHOSPHATE, DIBASIC INJECTION, 236; 224 MG/ML; MG/ML
30 SOLUTION, CONCENTRATE INTRAVENOUS ONCE
Refills: 0 | Status: COMPLETED | OUTPATIENT
Start: 2025-03-20 | End: 2025-03-20

## 2025-03-20 RX ORDER — LORAZEPAM 4 MG/ML
2 VIAL (ML) INJECTION EVERY 6 HOURS
Refills: 0 | Status: DISCONTINUED | OUTPATIENT
Start: 2025-03-20 | End: 2025-03-21

## 2025-03-20 RX ADMIN — ROSUVASTATIN CALCIUM 10 MILLIGRAM(S): 5 TABLET, FILM COATED ORAL at 21:01

## 2025-03-20 RX ADMIN — Medication 20 MILLIGRAM(S): at 09:27

## 2025-03-20 RX ADMIN — Medication 2 MILLIGRAM(S): at 03:41

## 2025-03-20 RX ADMIN — METOPROLOL SUCCINATE 50 MILLIGRAM(S): 50 TABLET, EXTENDED RELEASE ORAL at 21:01

## 2025-03-20 RX ADMIN — Medication 2 MILLIGRAM(S): at 02:34

## 2025-03-20 RX ADMIN — EZETIMIBE 10 MILLIGRAM(S): 10 TABLET ORAL at 09:27

## 2025-03-20 RX ADMIN — Medication 1 TABLET(S): at 09:27

## 2025-03-20 RX ADMIN — METOPROLOL SUCCINATE 100 MILLIGRAM(S): 50 TABLET, EXTENDED RELEASE ORAL at 09:27

## 2025-03-20 RX ADMIN — Medication 40 MILLIGRAM(S): at 17:42

## 2025-03-20 RX ADMIN — CEFTRIAXONE 1000 MILLIGRAM(S): 500 INJECTION, POWDER, FOR SOLUTION INTRAMUSCULAR; INTRAVENOUS at 11:39

## 2025-03-20 RX ADMIN — Medication 4 MILLIGRAM(S): at 05:14

## 2025-03-20 RX ADMIN — Medication 100 MILLIGRAM(S): at 09:27

## 2025-03-20 RX ADMIN — Medication 40 MILLIGRAM(S): at 05:14

## 2025-03-20 RX ADMIN — Medication 20 MILLIGRAM(S): at 21:01

## 2025-03-20 RX ADMIN — Medication 2 MILLIGRAM(S): at 17:42

## 2025-03-20 RX ADMIN — FOLIC ACID 1 MILLIGRAM(S): 1 TABLET ORAL at 09:27

## 2025-03-20 RX ADMIN — POTASSIUM PHOSPHATE, MONOBASIC POTASSIUM PHOSPHATE, DIBASIC INJECTION, 83.33 MILLIMOLE(S): 236; 224 SOLUTION, CONCENTRATE INTRAVENOUS at 11:37

## 2025-03-20 NOTE — PROGRESS NOTE ADULT - ASSESSMENT
79 y/o F with PMHx of HTN, HLD, alcohol use disorder, GERD, PUD, hx thoracic/lumbar compression deformities s/p surgery, presented to  ED after mechanical fall this AM. Pt reports she was in her recliner, attempted to get up and fell to the ground. Denies HS or LOC. After fall patient reporting right sided hip pain and difficulty ambulating, thus prompting coming to ED for further evaluation. Pt endorses drinking 3 glasses of wine daily, last drink yesterday evening PTA, though patient appears to not be best historian and initially was lying to emergency room staff about alcohol use. Admitted for:     #S/p mechanical fall, difficulty ambulating   #Possible UTI   -CT head negative for acute pathology  -CXR, R. hip Xray and CT bony pelvis negative   -UA with mod LE, 5 WBCs, many bacteria   -will continue with Rocephin for 3 days   -UCx: contaminated   -BCx: NGTD   -PT eval: Sub-acute Rehab; [facility]   -pain control PRN     #Alcohol use disorder with acute withdrawal   -endorses 3 glasses of wine daily, possibly contributed to fall, last drink yesterday evening 3/17  -CIWA protocol  -High dose CIWA protocol  -Thiamine/folic acid/MV  -inpatient rehab    #Sinus tachycardia: resolved   #HTN/HLD  -likely secondary to dehydration   -Metoprolol, losartan, statin     #Anemia, Hx of PUD, GERD  -unsure if pt is having any melena, denies hematochezia  -iron studies noted   -f/u on occult   -IV PPI for now   -Continue with H2 blocker    #Thrombocytopenia  -Likely secondary to EtOH use  -Monitor and trend  -no splenomegaly on US    #Mild transaminitis  -Monitor and trend  -US abdomen: hepatic steatosis     #VTE prophylaxis  -SCDs

## 2025-03-20 NOTE — PROGRESS NOTE ADULT - SUBJECTIVE AND OBJECTIVE BOX
HOSPITALIST ATTENDING PROGRESS NOTE     Chart and meds reviewed. Patient seen and examined     Interval Hx/Events: Pt seen and evaluated. Has no acute complaints. Denies any abdominal pain. Unsure if she having melena. No other acute complaints.     3/20: Pt seen and evaluated. Noted to be lethargic overnight, requiring on PRN Ativan. More alert this AM. Denies any chest pain, abdominal pain. No other acute events.     All other systems and founds to be negative with exception of what has been described above.     PHYSICAL EXAM:  Vital Signs Last 24 Hrs  T(C): 36.7 (20 Mar 2025 12:24), Max: 37.3 (19 Mar 2025 16:52)  T(F): 98.1 (20 Mar 2025 12:24), Max: 99.1 (19 Mar 2025 16:52)  HR: 75 (20 Mar 2025 12:24) (60 - 75)  BP: 141/57 (20 Mar 2025 12:24) (126/50 - 148/63)  RR: 19 (20 Mar 2025 12:24) (17 - 19)  SpO2: 99% (20 Mar 2025 12:24) (94% - 99%)    Parameters below as of 20 Mar 2025 12:24  Patient On (Oxygen Delivery Method): nasal cannula          Daily     Daily Weight in k.3 (18 Mar 2025 18:35)    GEN: NAD   HEENT: EOMI,  +dry MM  NECK : Soft and supple, no JVD  LUNG: CTABL, No wheezing, rales or rhonchi  CVS: S1S2+, RRR, no M/G/R  GI: BS+, soft, NT/ND, no guarding, no rebound  EXTREMITIES: No peripheral edema  VASCULAR: 2+ peripheral pulses  NEURO: AAOx2-3, grossly non-focal   SKIN: No rashes    HOME MEDICATIONS:  Home Medications:  famotidine 40 mg oral tablet: 1 tab(s) orally once a day (18 Mar 2025 15:23)  metoprolol succinate 100 mg oral tablet, extended release: 1 tab(s) orally once a day (in the morning) (18 Mar 2025 14:49)  metoprolol succinate 50 mg oral tablet, extended release: 1 tab(s) orally once a day (in the evening) (18 Mar 2025 14:49)  olmesartan 40 mg oral tablet: 1 tab(s) orally once a day (18 Mar 2025 14:49)  rosuvastatin 10 mg oral tablet: 1 tab(s) orally once a day (18 Mar 2025 14:49)  Vitamin D3 25 mcg (1000 intl units) oral tablet: 4 tab(s) orally once a day (18 Mar 2025 14:49)  Zetia 10 mg oral tablet: 1 tab(s) orally once a day (18 Mar 2025 14:49)      MEDICATIONS  MEDICATIONS  (STANDING):  cefTRIAXone Injectable. 1000 milliGRAM(s) IV Push every 24 hours  ezetimibe 10 milliGRAM(s) Oral daily  famotidine    Tablet 20 milliGRAM(s) Oral two times a day  folic acid 1 milliGRAM(s) Oral daily  LORazepam   Injectable   IV Push   LORazepam   Injectable 2 milliGRAM(s) IV Push every 6 hours  losartan 100 milliGRAM(s) Oral daily  metoprolol succinate  milliGRAM(s) Oral daily  metoprolol succinate ER 50 milliGRAM(s) Oral at bedtime  multivitamin/minerals 1 Tablet(s) Oral daily  pantoprazole  Injectable 40 milliGRAM(s) IV Push two times a day  rosuvastatin 10 milliGRAM(s) Oral at bedtime  sodium chloride 0.9%. 1000 milliLiter(s) (75 mL/Hr) IV Continuous <Continuous>  thiamine 100 milliGRAM(s) Oral daily    MEDICATIONS  (PRN):  acetaminophen     Tablet .. 650 milliGRAM(s) Oral every 6 hours PRN Temp greater or equal to 38C (100.4F), Mild Pain (1 - 3)  aluminum hydroxide/magnesium hydroxide/simethicone Suspension 30 milliLiter(s) Oral every 4 hours PRN Dyspepsia  LORazepam   Injectable 2 milliGRAM(s) IV Push every 1 hour PRN CIWA 8-14  melatonin 3 milliGRAM(s) Oral at bedtime PRN Insomnia  ondansetron Injectable 4 milliGRAM(s) IV Push every 8 hours PRN Nausea and/or Vomiting        LABS: All Labs Reviewed:                        8.4    4.00  )-----------( 75       ( 20 Mar 2025 06:42 )             25.5   03-20    140  |  108  |  14  ----------------------------<  92  3.1[L]   |  25  |  0.80    Ca    8.1[L]      20 Mar 2025 06:42  Phos  2.1       Mg     2.0         TPro  5.8[L]  /  Alb  2.8[L]  /  TBili  0.8  /  DBili  x   /  AST  63[H]  /  ALT  63  /  AlkPhos  91               Urine Microscopic-Add On (NC) (25 @ 08:30)    White Blood Cell - Urine: 5 /HPF   Red Blood Cell - Urine: 2 /HPF   Bacteria: Many /HPF   Granular Cast: Present   Calcium Oxalate Crystals: Present   Comment - Urine: amorphous material present    Culture - Urine (25 @ 08:30)    Specimen Source: Urine None   Culture Results:   >=3 organisms. Probable collection contamination.    Culture - Blood (25 @ 10:41)    Specimen Source: Blood None   Culture Results:   No growth at 24 hours          Culture - Blood (25 @ 10:41)    Specimen Source: Blood None   Culture Results:   No growth at 24 hours    I&O's Detail    19 Mar 2025 07:01  -  20 Mar 2025 07:00  --------------------------------------------------------  IN:  Total IN: 0 mL    OUT:    Voided (mL): 900 mL  Total OUT: 900 mL    Total NET: -900 mL          CAPILLARY BLOOD GLUCOSE    CARDIOLOGY TESTING     EKG: reviewed     ECHO       RADIOLOGY: reviewed

## 2025-03-21 LAB
ANION GAP SERPL CALC-SCNC: 7 MMOL/L — SIGNIFICANT CHANGE UP (ref 5–17)
BUN SERPL-MCNC: 10 MG/DL — SIGNIFICANT CHANGE UP (ref 7–23)
CALCIUM SERPL-MCNC: 8.3 MG/DL — LOW (ref 8.5–10.1)
CHLORIDE SERPL-SCNC: 110 MMOL/L — HIGH (ref 96–108)
CO2 SERPL-SCNC: 23 MMOL/L — SIGNIFICANT CHANGE UP (ref 22–31)
CREAT SERPL-MCNC: 0.79 MG/DL — SIGNIFICANT CHANGE UP (ref 0.5–1.3)
EGFR: 76 ML/MIN/1.73M2 — SIGNIFICANT CHANGE UP
EGFR: 76 ML/MIN/1.73M2 — SIGNIFICANT CHANGE UP
GLUCOSE SERPL-MCNC: 120 MG/DL — HIGH (ref 70–99)
HCT VFR BLD CALC: 29 % — LOW (ref 34.5–45)
HGB BLD-MCNC: 9.4 G/DL — LOW (ref 11.5–15.5)
IMMATURE PLATELET FRACTION #: 3.9 K/UL — LOW (ref 4.7–11.1)
IMMATURE PLATELET FRACTION %: 6.8 % — HIGH (ref 1.6–4.9)
MAGNESIUM SERPL-MCNC: 1.9 MG/DL — SIGNIFICANT CHANGE UP (ref 1.6–2.6)
MCHC RBC-ENTMCNC: 32.4 G/DL — SIGNIFICANT CHANGE UP (ref 32–36)
MCHC RBC-ENTMCNC: 34.8 PG — HIGH (ref 27–34)
MCV RBC AUTO: 107.4 FL — HIGH (ref 80–100)
NRBC # BLD AUTO: 0 K/UL — SIGNIFICANT CHANGE UP (ref 0–0)
NRBC # FLD: 0 K/UL — SIGNIFICANT CHANGE UP (ref 0–0)
NRBC BLD AUTO-RTO: 0 /100 WBCS — SIGNIFICANT CHANGE UP (ref 0–0)
PHOSPHATE SERPL-MCNC: 2.7 MG/DL — SIGNIFICANT CHANGE UP (ref 2.5–4.5)
PLATELET # BLD AUTO: 58 K/UL — LOW (ref 150–400)
PMV BLD: 12.3 FL — SIGNIFICANT CHANGE UP (ref 7–13)
POTASSIUM SERPL-MCNC: 3.4 MMOL/L — LOW (ref 3.5–5.3)
POTASSIUM SERPL-SCNC: 3.4 MMOL/L — LOW (ref 3.5–5.3)
RBC # BLD: 2.7 M/UL — LOW (ref 3.8–5.2)
RBC # FLD: 14.5 % — SIGNIFICANT CHANGE UP (ref 10.3–14.5)
SODIUM SERPL-SCNC: 140 MMOL/L — SIGNIFICANT CHANGE UP (ref 135–145)
WBC # BLD: 4.61 K/UL — SIGNIFICANT CHANGE UP (ref 3.8–10.5)
WBC # FLD AUTO: 4.61 K/UL — SIGNIFICANT CHANGE UP (ref 3.8–10.5)

## 2025-03-21 PROCEDURE — 99233 SBSQ HOSP IP/OBS HIGH 50: CPT

## 2025-03-21 RX ADMIN — Medication 20 MILLIGRAM(S): at 09:29

## 2025-03-21 RX ADMIN — Medication 40 MILLIGRAM(S): at 05:22

## 2025-03-21 RX ADMIN — Medication 40 MILLIEQUIVALENT(S): at 09:29

## 2025-03-21 RX ADMIN — FOLIC ACID 1 MILLIGRAM(S): 1 TABLET ORAL at 09:30

## 2025-03-21 RX ADMIN — Medication 1 TABLET(S): at 09:29

## 2025-03-21 RX ADMIN — Medication 40 MILLIGRAM(S): at 17:37

## 2025-03-21 RX ADMIN — METOPROLOL SUCCINATE 50 MILLIGRAM(S): 50 TABLET, EXTENDED RELEASE ORAL at 20:54

## 2025-03-21 RX ADMIN — Medication 20 MILLIGRAM(S): at 20:55

## 2025-03-21 RX ADMIN — Medication 2 MILLIGRAM(S): at 05:25

## 2025-03-21 RX ADMIN — EZETIMIBE 10 MILLIGRAM(S): 10 TABLET ORAL at 09:53

## 2025-03-21 RX ADMIN — Medication 100 MILLIGRAM(S): at 10:22

## 2025-03-21 RX ADMIN — CEFTRIAXONE 1000 MILLIGRAM(S): 500 INJECTION, POWDER, FOR SOLUTION INTRAMUSCULAR; INTRAVENOUS at 09:30

## 2025-03-21 RX ADMIN — ROSUVASTATIN CALCIUM 10 MILLIGRAM(S): 5 TABLET, FILM COATED ORAL at 20:51

## 2025-03-21 RX ADMIN — Medication 2 MILLIGRAM(S): at 00:15

## 2025-03-21 RX ADMIN — LOSARTAN POTASSIUM 100 MILLIGRAM(S): 100 TABLET, FILM COATED ORAL at 05:23

## 2025-03-21 RX ADMIN — Medication 40 MILLIEQUIVALENT(S): at 13:24

## 2025-03-21 RX ADMIN — METOPROLOL SUCCINATE 100 MILLIGRAM(S): 50 TABLET, EXTENDED RELEASE ORAL at 09:29

## 2025-03-21 RX ADMIN — Medication 2 MILLIGRAM(S): at 16:42

## 2025-03-21 NOTE — PROGRESS NOTE ADULT - SUBJECTIVE AND OBJECTIVE BOX
HOSPITALIST ATTENDING PROGRESS NOTE     Chart and meds reviewed. Patient seen and examined     Interval Hx/Events: Pt seen and evaluated. Has no acute complaints. Denies any abdominal pain. Unsure if she having melena. No other acute complaints.     3/20: Pt seen and evaluated. Noted to be lethargic overnight, requiring on PRN Ativan. More alert this AM. Denies any chest pain, abdominal pain. No other acute events.     3/21: Pt seen and evaluated. More alert and awake. Working with PT. No other acute complaints or events.     All other systems and founds to be negative with exception of what has been described above.     PHYSICAL EXAM:  Vital Signs Last 24 Hrs  T(C): 36.7 (21 Mar 2025 16:17), Max: 37 (20 Mar 2025 20:57)  T(F): 98.1 (21 Mar 2025 16:17), Max: 98.6 (20 Mar 2025 20:57)  HR: 73 (21 Mar 2025 16:17) (59 - 75)  BP: 174/73 (21 Mar 2025 16:17) (138/66 - 178/59)  RR: 19 (21 Mar 2025 16:17) (18 - 24)  SpO2: 99% (21 Mar 2025 16:17) (94% - 100%)    Parameters below as of 21 Mar 2025 16:17  Patient On (Oxygen Delivery Method): room air    Daily     Daily Weight in k.3 (18 Mar 2025 18:35)    GEN: NAD   HEENT: EOMI,  +dry MM  NECK : Soft and supple, no JVD  LUNG: CTABL, No wheezing, rales or rhonchi  CVS: S1S2+, RRR, no M/G/R  GI: BS+, soft, NT/ND, no guarding, no rebound  EXTREMITIES: No peripheral edema  VASCULAR: 2+ peripheral pulses  NEURO: AAOx2-3, grossly non-focal   SKIN: No rashes    HOME MEDICATIONS:  Home Medications:  famotidine 40 mg oral tablet: 1 tab(s) orally once a day (18 Mar 2025 15:23)  metoprolol succinate 100 mg oral tablet, extended release: 1 tab(s) orally once a day (in the morning) (18 Mar 2025 14:49)  metoprolol succinate 50 mg oral tablet, extended release: 1 tab(s) orally once a day (in the evening) (18 Mar 2025 14:49)  olmesartan 40 mg oral tablet: 1 tab(s) orally once a day (18 Mar 2025 14:49)  rosuvastatin 10 mg oral tablet: 1 tab(s) orally once a day (18 Mar 2025 14:49)  Vitamin D3 25 mcg (1000 intl units) oral tablet: 4 tab(s) orally once a day (18 Mar 2025 14:49)  Zetia 10 mg oral tablet: 1 tab(s) orally once a day (18 Mar 2025 14:49)      MEDICATIONS  MEDICATIONS  (STANDING):  ezetimibe 10 milliGRAM(s) Oral daily  famotidine    Tablet 20 milliGRAM(s) Oral two times a day  folic acid 1 milliGRAM(s) Oral daily  LORazepam   Injectable   IV Push   LORazepam   Injectable 1.5 milliGRAM(s) IV Push every 6 hours  losartan 100 milliGRAM(s) Oral daily  metoprolol succinate  milliGRAM(s) Oral daily  metoprolol succinate ER 50 milliGRAM(s) Oral at bedtime  multivitamin/minerals 1 Tablet(s) Oral daily  pantoprazole  Injectable 40 milliGRAM(s) IV Push two times a day  rosuvastatin 10 milliGRAM(s) Oral at bedtime  thiamine 100 milliGRAM(s) Oral daily    MEDICATIONS  (PRN):  acetaminophen     Tablet .. 650 milliGRAM(s) Oral every 6 hours PRN Temp greater or equal to 38C (100.4F), Mild Pain (1 - 3)  aluminum hydroxide/magnesium hydroxide/simethicone Suspension 30 milliLiter(s) Oral every 4 hours PRN Dyspepsia  LORazepam   Injectable 2 milliGRAM(s) IV Push every 1 hour PRN CIWA 8-14  melatonin 3 milliGRAM(s) Oral at bedtime PRN Insomnia  ondansetron Injectable 4 milliGRAM(s) IV Push every 8 hours PRN Nausea and/or Vomiting        LABS: All Labs Reviewed:                        9.4    4.61  )-----------( 58       ( 21 Mar 2025 09:24 )             29.0   03-21    140  |  110[H]  |  10  ----------------------------<  120[H]  3.4[L]   |  23  |  0.79    Ca    8.3[L]      21 Mar 2025 06:07  Phos  2.7       Mg     1.9         TPro  5.8[L]  /  Alb  2.8[L]  /  TBili  0.8  /  DBili  x   /  AST  63[H]  /  ALT  63  /  AlkPhos  91      Urine Microscopic-Add On (NC) (25 @ 08:30)    White Blood Cell - Urine: 5 /HPF   Red Blood Cell - Urine: 2 /HPF   Bacteria: Many /HPF   Granular Cast: Present   Calcium Oxalate Crystals: Present   Comment - Urine: amorphous material present    Culture - Urine (25 @ 08:30)    -  Minocycline: S <=4   -  Amikacin: S <=16   -  Ampicillin/Sulbactam: S <=4/2   -  Cefepime: S 4   -  Cefotaxime: I 16   -  Ceftazidime: S 4   -  Ceftriaxone: I 32   -  Ciprofloxacin: S <=0.25   -  Gentamicin: S <=2   -  Imipenem: S <=1   -  Levofloxacin: S <=0.5   -  Meropenem: S <=1   -  Tobramycin: S <=2   -  Trimethoprim/Sulfamethoxazole: S <=0.5/9.5   Specimen Source: Urine None   Culture Results:   >=3 organisms. Probable collection contamination.   Organism Identification: Gram Negative Rods   Organism: Gram Negative Rods   Method Type: RITESH    Culture - Blood (25 @ 10:41)    Specimen Source: Blood None   Culture Results:   No growth at 48 Hours    Culture - Blood (25 @ 10:41)    Specimen Source: Blood None   Culture Results:   No growth at 48 Hours      I&O's Detail            CAPILLARY BLOOD GLUCOSE    CARDIOLOGY TESTING     EKG: reviewed     ECHO       RADIOLOGY: reviewed

## 2025-03-21 NOTE — PROGRESS NOTE ADULT - ASSESSMENT
79 y/o F with PMHx of HTN, HLD, alcohol use disorder, GERD, PUD, hx thoracic/lumbar compression deformities s/p surgery, presented to  ED after mechanical fall this AM. Pt reports she was in her recliner, attempted to get up and fell to the ground. Denies HS or LOC. After fall patient reporting right sided hip pain and difficulty ambulating, thus prompting coming to ED for further evaluation. Pt endorses drinking 3 glasses of wine daily, last drink yesterday evening PTA, though patient appears to not be best historian and initially was lying to emergency room staff about alcohol use. Admitted for:     #S/p mechanical fall, difficulty ambulating   #Possible UTI   -CT head negative for acute pathology  -CXR, R. hip Xray and CT bony pelvis negative   -UA with mod LE, 5 WBCs, many bacteria   -will continue with Rocephin for 3 days   -UCx: contaminated   -BCx: NGTD   -PT eval: Sub-acute Rehab; [facility]   -pain control PRN     #Alcohol use disorder with acute withdrawal   -endorses 3 glasses of wine daily, possibly contributed to fall, last drink yesterday evening 3/17  -[facility] protocol  -High dose [facility] protocol  -Thiamine/folic acid/MV  -inpatient rehab    #Sinus tachycardia: resolved   #HTN/HLD  -likely secondary to dehydration   -Metoprolol, losartan, statin     #Anemia, Hx of PUD, GERD  -unsure if pt is having any melena, denies hematochezia  -iron studies noted   -f/u on occult   -IV PPI for now   -Continue with H2 blocker    #Thrombocytopenia  -Likely secondary to EtOH use  -Monitor and trend  -no splenomegaly on US    #Mild transaminitis  -Monitor and trend  -US abdomen: hepatic steatosis     #Hyperammonia   -no clinical evidence of HE   -monitor for now   -no need to treat     #VTE prophylaxis  -SCDs

## 2025-03-22 LAB
ANION GAP SERPL CALC-SCNC: 6 MMOL/L — SIGNIFICANT CHANGE UP (ref 5–17)
APPEARANCE UR: CLEAR — SIGNIFICANT CHANGE UP
BACTERIA # UR AUTO: NEGATIVE /HPF — SIGNIFICANT CHANGE UP
BILIRUB UR-MCNC: NEGATIVE — SIGNIFICANT CHANGE UP
BUN SERPL-MCNC: 13 MG/DL — SIGNIFICANT CHANGE UP (ref 7–23)
CALCIUM SERPL-MCNC: 8.5 MG/DL — SIGNIFICANT CHANGE UP (ref 8.5–10.1)
CAST: 0 /LPF — SIGNIFICANT CHANGE UP (ref 0–4)
CHLORIDE SERPL-SCNC: 110 MMOL/L — HIGH (ref 96–108)
CO2 SERPL-SCNC: 24 MMOL/L — SIGNIFICANT CHANGE UP (ref 22–31)
COLOR SPEC: YELLOW — SIGNIFICANT CHANGE UP
CREAT SERPL-MCNC: 0.79 MG/DL — SIGNIFICANT CHANGE UP (ref 0.5–1.3)
DIFF PNL FLD: NEGATIVE — SIGNIFICANT CHANGE UP
EGFR: 76 ML/MIN/1.73M2 — SIGNIFICANT CHANGE UP
EGFR: 76 ML/MIN/1.73M2 — SIGNIFICANT CHANGE UP
GLUCOSE SERPL-MCNC: 128 MG/DL — HIGH (ref 70–99)
GLUCOSE UR QL: 100 MG/DL
HCT VFR BLD CALC: 29.5 % — LOW (ref 34.5–45)
HGB BLD-MCNC: 9.7 G/DL — LOW (ref 11.5–15.5)
IMMATURE PLATELET FRACTION #: 5.9 K/UL — SIGNIFICANT CHANGE UP (ref 4.7–11.1)
IMMATURE PLATELET FRACTION %: 7.3 % — HIGH (ref 1.6–4.9)
KETONES UR-MCNC: 15 MG/DL
LEUKOCYTE ESTERASE UR-ACNC: NEGATIVE — SIGNIFICANT CHANGE UP
MAGNESIUM SERPL-MCNC: 1.9 MG/DL — SIGNIFICANT CHANGE UP (ref 1.6–2.6)
MCHC RBC-ENTMCNC: 32.9 G/DL — SIGNIFICANT CHANGE UP (ref 32–36)
MCHC RBC-ENTMCNC: 34.5 PG — HIGH (ref 27–34)
MCV RBC AUTO: 105 FL — HIGH (ref 80–100)
NITRITE UR-MCNC: NEGATIVE — SIGNIFICANT CHANGE UP
NRBC # BLD AUTO: 0 K/UL — SIGNIFICANT CHANGE UP (ref 0–0)
NRBC # FLD: 0 K/UL — SIGNIFICANT CHANGE UP (ref 0–0)
NRBC BLD AUTO-RTO: 0 /100 WBCS — SIGNIFICANT CHANGE UP (ref 0–0)
PH UR: 6 — SIGNIFICANT CHANGE UP (ref 5–8)
PHOSPHATE SERPL-MCNC: 1.9 MG/DL — LOW (ref 2.5–4.5)
PLATELET # BLD AUTO: 81 K/UL — LOW (ref 150–400)
PMV BLD: 12.3 FL — SIGNIFICANT CHANGE UP (ref 7–13)
POTASSIUM SERPL-MCNC: 3.6 MMOL/L — SIGNIFICANT CHANGE UP (ref 3.5–5.3)
POTASSIUM SERPL-SCNC: 3.6 MMOL/L — SIGNIFICANT CHANGE UP (ref 3.5–5.3)
PROT UR-MCNC: 30 MG/DL
RBC # BLD: 2.81 M/UL — LOW (ref 3.8–5.2)
RBC # FLD: 14 % — SIGNIFICANT CHANGE UP (ref 10.3–14.5)
RBC CASTS # UR COMP ASSIST: 1 /HPF — SIGNIFICANT CHANGE UP (ref 0–4)
SODIUM SERPL-SCNC: 140 MMOL/L — SIGNIFICANT CHANGE UP (ref 135–145)
SP GR SPEC: 1.02 — SIGNIFICANT CHANGE UP (ref 1–1.03)
SQUAMOUS # UR AUTO: 2 /HPF — SIGNIFICANT CHANGE UP (ref 0–5)
UROBILINOGEN FLD QL: 0.2 MG/DL — SIGNIFICANT CHANGE UP (ref 0.2–1)
WBC # BLD: 4.26 K/UL — SIGNIFICANT CHANGE UP (ref 3.8–10.5)
WBC # FLD AUTO: 4.26 K/UL — SIGNIFICANT CHANGE UP (ref 3.8–10.5)
WBC UR QL: 0 /HPF — SIGNIFICANT CHANGE UP (ref 0–5)

## 2025-03-22 PROCEDURE — 99232 SBSQ HOSP IP/OBS MODERATE 35: CPT

## 2025-03-22 RX ORDER — MAGNESIUM SULFATE 500 MG/ML
1 SYRINGE (ML) INJECTION ONCE
Refills: 0 | Status: COMPLETED | OUTPATIENT
Start: 2025-03-22 | End: 2025-03-22

## 2025-03-22 RX ORDER — POTASSIUM PHOSPHATE, MONOBASIC POTASSIUM PHOSPHATE, DIBASIC INJECTION, 236; 224 MG/ML; MG/ML
30 SOLUTION, CONCENTRATE INTRAVENOUS ONCE
Refills: 0 | Status: COMPLETED | OUTPATIENT
Start: 2025-03-22 | End: 2025-03-22

## 2025-03-22 RX ADMIN — Medication 100 MILLIGRAM(S): at 11:18

## 2025-03-22 RX ADMIN — Medication 20 MILLIGRAM(S): at 21:15

## 2025-03-22 RX ADMIN — Medication 1 TABLET(S): at 11:18

## 2025-03-22 RX ADMIN — Medication 1 MILLIGRAM(S): at 18:00

## 2025-03-22 RX ADMIN — POTASSIUM PHOSPHATE, MONOBASIC POTASSIUM PHOSPHATE, DIBASIC INJECTION, 85 MILLIMOLE(S): 236; 224 SOLUTION, CONCENTRATE INTRAVENOUS at 11:15

## 2025-03-22 RX ADMIN — LOSARTAN POTASSIUM 100 MILLIGRAM(S): 100 TABLET, FILM COATED ORAL at 05:07

## 2025-03-22 RX ADMIN — METOPROLOL SUCCINATE 100 MILLIGRAM(S): 50 TABLET, EXTENDED RELEASE ORAL at 11:18

## 2025-03-22 RX ADMIN — Medication 2 MILLIGRAM(S): at 21:47

## 2025-03-22 RX ADMIN — EZETIMIBE 10 MILLIGRAM(S): 10 TABLET ORAL at 11:18

## 2025-03-22 RX ADMIN — FOLIC ACID 1 MILLIGRAM(S): 1 TABLET ORAL at 11:22

## 2025-03-22 RX ADMIN — Medication 40 MILLIGRAM(S): at 05:19

## 2025-03-22 RX ADMIN — Medication 1.5 MILLIGRAM(S): at 11:09

## 2025-03-22 RX ADMIN — Medication 20 MILLIGRAM(S): at 11:18

## 2025-03-22 RX ADMIN — Medication 100 GRAM(S): at 11:16

## 2025-03-22 RX ADMIN — Medication 1.5 MILLIGRAM(S): at 00:18

## 2025-03-22 RX ADMIN — ROSUVASTATIN CALCIUM 10 MILLIGRAM(S): 5 TABLET, FILM COATED ORAL at 21:15

## 2025-03-22 RX ADMIN — METOPROLOL SUCCINATE 50 MILLIGRAM(S): 50 TABLET, EXTENDED RELEASE ORAL at 21:15

## 2025-03-22 RX ADMIN — Medication 40 MILLIGRAM(S): at 18:00

## 2025-03-22 RX ADMIN — Medication 1.5 MILLIGRAM(S): at 06:43

## 2025-03-22 NOTE — PROGRESS NOTE ADULT - SUBJECTIVE AND OBJECTIVE BOX
HOSPITALIST ATTENDING PROGRESS NOTE     Chart and meds reviewed. Patient seen and examined     Interval Hx/Events: Pt seen and evaluated. Has no acute complaints. Denies any abdominal pain. Unsure if she having melena. No other acute complaints.     3/20: Pt seen and evaluated. Noted to be lethargic overnight, requiring on PRN Ativan. More alert this AM. Denies any chest pain, abdominal pain. No other acute events.     3/21: Pt seen and evaluated. More alert and awake. Working with PT. No other acute complaints or events.     3/22: No acute events. No acute complaints.     All other systems and founds to be negative with exception of what has been described above.     PHYSICAL EXAM:  Vital Signs Last 24 Hrs  T(C): 36.4 (22 Mar 2025 11:54), Max: 36.7 (21 Mar 2025 16:17)  T(F): 97.5 (22 Mar 2025 11:54), Max: 98.1 (21 Mar 2025 16:17)  HR: 64 (22 Mar 2025 11:54) (61 - 73)  BP: 158/58 (22 Mar 2025 11:54) (157/53 - 174/73)  RR: 18 (22 Mar 2025 11:54) (18 - 19)  SpO2: 98% (22 Mar 2025 11:54) (96% - 99%)    Parameters below as of 22 Mar 2025 11:54  Patient On (Oxygen Delivery Method): room air    GEN: NAD   HEENT: EOMI,  +dry MM  NECK : Soft and supple, no JVD  LUNG: CTABL, No wheezing, rales or rhonchi  CVS: S1S2+, RRR, no M/G/R  GI: BS+, soft, NT/ND, no guarding, no rebound  EXTREMITIES: No peripheral edema  VASCULAR: 2+ peripheral pulses  NEURO: AAOx2-3, grossly non-focal   SKIN: No rashes    HOME MEDICATIONS:  Home Medications:  famotidine 40 mg oral tablet: 1 tab(s) orally once a day (18 Mar 2025 15:23)  metoprolol succinate 100 mg oral tablet, extended release: 1 tab(s) orally once a day (in the morning) (18 Mar 2025 14:49)  metoprolol succinate 50 mg oral tablet, extended release: 1 tab(s) orally once a day (in the evening) (18 Mar 2025 14:49)  olmesartan 40 mg oral tablet: 1 tab(s) orally once a day (18 Mar 2025 14:49)  rosuvastatin 10 mg oral tablet: 1 tab(s) orally once a day (18 Mar 2025 14:49)  Vitamin D3 25 mcg (1000 intl units) oral tablet: 4 tab(s) orally once a day (18 Mar 2025 14:49)  Zetia 10 mg oral tablet: 1 tab(s) orally once a day (18 Mar 2025 14:49)      MEDICATIONS  MEDICATIONS  (STANDING):  ezetimibe 10 milliGRAM(s) Oral daily  famotidine    Tablet 20 milliGRAM(s) Oral two times a day  folic acid 1 milliGRAM(s) Oral daily  LORazepam   Injectable 1 milliGRAM(s) IV Push every 6 hours  LORazepam   Injectable   IV Push   losartan 100 milliGRAM(s) Oral daily  metoprolol succinate  milliGRAM(s) Oral daily  metoprolol succinate ER 50 milliGRAM(s) Oral at bedtime  multivitamin/minerals 1 Tablet(s) Oral daily  pantoprazole  Injectable 40 milliGRAM(s) IV Push two times a day  rosuvastatin 10 milliGRAM(s) Oral at bedtime  thiamine 100 milliGRAM(s) Oral daily    MEDICATIONS  (PRN):  acetaminophen     Tablet .. 650 milliGRAM(s) Oral every 6 hours PRN Temp greater or equal to 38C (100.4F), Mild Pain (1 - 3)  aluminum hydroxide/magnesium hydroxide/simethicone Suspension 30 milliLiter(s) Oral every 4 hours PRN Dyspepsia  LORazepam   Injectable 2 milliGRAM(s) IV Push every 1 hour PRN CIWA 8-14  melatonin 3 milliGRAM(s) Oral at bedtime PRN Insomnia  ondansetron Injectable 4 milliGRAM(s) IV Push every 8 hours PRN Nausea and/or Vomiting          LABS: All Labs Reviewed:                        9.7    4.26  )-----------( 81       ( 22 Mar 2025 08:25 )             29.5   03-22    140  |  110[H]  |  13  ----------------------------<  128[H]  3.6   |  24  |  0.79    Ca    8.5      22 Mar 2025 09:22  Phos  1.9     03-22  Mg     1.9     03-22                   Urine Microscopic-Add On (NC) (03.18.25 @ 08:30)    White Blood Cell - Urine: 5 /HPF   Red Blood Cell - Urine: 2 /HPF   Bacteria: Many /HPF   Granular Cast: Present   Calcium Oxalate Crystals: Present   Comment - Urine: amorphous material present    Culture - Urine (03.18.25 @ 08:30)    -  Minocycline: S <=4   -  Amikacin: S <=16   -  Ampicillin/Sulbactam: S <=4/2   -  Cefepime: S 4   -  Cefotaxime: I 16   -  Ceftazidime: S 4   -  Ceftriaxone: I 32   -  Ciprofloxacin: S <=0.25   -  Gentamicin: S <=2   -  Imipenem: S <=1   -  Levofloxacin: S <=0.5   -  Meropenem: S <=1   -  Tobramycin: S <=2   -  Trimethoprim/Sulfamethoxazole: S <=0.5/9.5   Specimen Source: Urine None   Culture Results:   >=3 organisms. Probable collection contamination.   Organism Identification: Gram Negative Rods   Organism: Gram Negative Rods   Method Type: RITESH    Culture - Blood (03.18.25 @ 10:41)    Specimen Source: Blood None   Culture Results:   No growth at 48 Hours    Culture - Blood (03.18.25 @ 10:41)    Specimen Source: Blood None   Culture Results:   No growth at 48 Hours      I&O's Detail            CAPILLARY BLOOD GLUCOSE    CARDIOLOGY TESTING     EKG: reviewed     ECHO       RADIOLOGY: reviewed

## 2025-03-22 NOTE — PROGRESS NOTE ADULT - ASSESSMENT
79 y/o F with PMHx of HTN, HLD, alcohol use disorder, GERD, PUD, hx thoracic/lumbar compression deformities s/p surgery, presented to  ED after mechanical fall this AM. Pt reports she was in her recliner, attempted to get up and fell to the ground. Denies HS or LOC. After fall patient reporting right sided hip pain and difficulty ambulating, thus prompting coming to ED for further evaluation. Pt endorses drinking 3 glasses of wine daily, last drink yesterday evening PTA, though patient appears to not be best historian and initially was lying to emergency room staff about alcohol use. Admitted for:     #S/p mechanical fall, difficulty ambulating   #Possible UTI   -CT head negative for acute pathology  -CXR, R. hip Xray and CT bony pelvis negative   -UA with mod LE, 5 WBCs, many bacteria   -finished course of Rocephin   -UCx: contaminated   -BCx: NGTD   -PT eval: Sub-acute Rehab; [facility]   -pain control PRN     #Alcohol use disorder with acute withdrawal   -endorses 3 glasses of wine daily, possibly contributed to fall, last drink yesterday evening 3/17  -[facility] protocol  -Thiamine/folic acid/MV  -inpatient rehab    #Sinus tachycardia: resolved   #HTN/HLD  -likely secondary to dehydration   -Metoprolol, losartan, statin     #Anemia, Hx of PUD, GERD  -unsure if pt is having any melena, denies hematochezia  -iron studies noted   -f/u on occult   -IV PPI for now   -Continue with H2 blocker    #Thrombocytopenia  -Likely secondary to EtOH use  -Monitor and trend  -no splenomegaly on US    #Mild transaminitis  -Monitor and trend  -US abdomen: hepatic steatosis     #Hyperammonia   -no clinical evidence of HE   -monitor for now   -no need to treat     #VTE prophylaxis  -SCDs

## 2025-03-23 LAB
ANION GAP SERPL CALC-SCNC: 6 MMOL/L — SIGNIFICANT CHANGE UP (ref 5–17)
BUN SERPL-MCNC: 12 MG/DL — SIGNIFICANT CHANGE UP (ref 7–23)
CALCIUM SERPL-MCNC: 8.4 MG/DL — LOW (ref 8.5–10.1)
CHLORIDE SERPL-SCNC: 109 MMOL/L — HIGH (ref 96–108)
CO2 SERPL-SCNC: 24 MMOL/L — SIGNIFICANT CHANGE UP (ref 22–31)
CREAT SERPL-MCNC: 0.7 MG/DL — SIGNIFICANT CHANGE UP (ref 0.5–1.3)
CULTURE RESULTS: SIGNIFICANT CHANGE UP
CULTURE RESULTS: SIGNIFICANT CHANGE UP
EGFR: 88 ML/MIN/1.73M2 — SIGNIFICANT CHANGE UP
EGFR: 88 ML/MIN/1.73M2 — SIGNIFICANT CHANGE UP
GLUCOSE SERPL-MCNC: 107 MG/DL — HIGH (ref 70–99)
HCT VFR BLD CALC: 31.3 % — LOW (ref 34.5–45)
HGB BLD-MCNC: 10.4 G/DL — LOW (ref 11.5–15.5)
IMMATURE PLATELET FRACTION #: 6.1 K/UL — SIGNIFICANT CHANGE UP (ref 4.7–11.1)
IMMATURE PLATELET FRACTION %: 6.8 % — HIGH (ref 1.6–4.9)
MAGNESIUM SERPL-MCNC: 2.1 MG/DL — SIGNIFICANT CHANGE UP (ref 1.6–2.6)
MCHC RBC-ENTMCNC: 33.2 G/DL — SIGNIFICANT CHANGE UP (ref 32–36)
MCHC RBC-ENTMCNC: 34.2 PG — HIGH (ref 27–34)
MCV RBC AUTO: 103 FL — HIGH (ref 80–100)
NRBC # BLD AUTO: 0 K/UL — SIGNIFICANT CHANGE UP (ref 0–0)
NRBC # FLD: 0 K/UL — SIGNIFICANT CHANGE UP (ref 0–0)
NRBC BLD AUTO-RTO: 0 /100 WBCS — SIGNIFICANT CHANGE UP (ref 0–0)
PHOSPHATE SERPL-MCNC: 2.8 MG/DL — SIGNIFICANT CHANGE UP (ref 2.5–4.5)
PLATELET # BLD AUTO: 89 K/UL — LOW (ref 150–400)
PMV BLD: 11.8 FL — SIGNIFICANT CHANGE UP (ref 7–13)
POTASSIUM SERPL-MCNC: 3.2 MMOL/L — LOW (ref 3.5–5.3)
POTASSIUM SERPL-SCNC: 3.2 MMOL/L — LOW (ref 3.5–5.3)
RBC # BLD: 3.04 M/UL — LOW (ref 3.8–5.2)
RBC # FLD: 14.1 % — SIGNIFICANT CHANGE UP (ref 10.3–14.5)
SODIUM SERPL-SCNC: 139 MMOL/L — SIGNIFICANT CHANGE UP (ref 135–145)
SPECIMEN SOURCE: SIGNIFICANT CHANGE UP
SPECIMEN SOURCE: SIGNIFICANT CHANGE UP
WBC # BLD: 4.86 K/UL — SIGNIFICANT CHANGE UP (ref 3.8–10.5)
WBC # FLD AUTO: 4.86 K/UL — SIGNIFICANT CHANGE UP (ref 3.8–10.5)

## 2025-03-23 PROCEDURE — 99233 SBSQ HOSP IP/OBS HIGH 50: CPT

## 2025-03-23 RX ORDER — ENOXAPARIN SODIUM 100 MG/ML
40 INJECTION SUBCUTANEOUS EVERY 24 HOURS
Refills: 0 | Status: DISCONTINUED | OUTPATIENT
Start: 2025-03-23 | End: 2025-03-30

## 2025-03-23 RX ADMIN — Medication 1 MILLIGRAM(S): at 13:04

## 2025-03-23 RX ADMIN — Medication 20 MILLIGRAM(S): at 22:35

## 2025-03-23 RX ADMIN — LOSARTAN POTASSIUM 100 MILLIGRAM(S): 100 TABLET, FILM COATED ORAL at 05:41

## 2025-03-23 RX ADMIN — Medication 100 MILLIGRAM(S): at 13:06

## 2025-03-23 RX ADMIN — ROSUVASTATIN CALCIUM 10 MILLIGRAM(S): 5 TABLET, FILM COATED ORAL at 22:36

## 2025-03-23 RX ADMIN — ENOXAPARIN SODIUM 40 MILLIGRAM(S): 100 INJECTION SUBCUTANEOUS at 13:04

## 2025-03-23 RX ADMIN — METOPROLOL SUCCINATE 100 MILLIGRAM(S): 50 TABLET, EXTENDED RELEASE ORAL at 13:06

## 2025-03-23 RX ADMIN — Medication 40 MILLIGRAM(S): at 05:40

## 2025-03-23 RX ADMIN — Medication 40 MILLIGRAM(S): at 13:09

## 2025-03-23 RX ADMIN — Medication 40 MILLIGRAM(S): at 22:36

## 2025-03-23 RX ADMIN — FOLIC ACID 1 MILLIGRAM(S): 1 TABLET ORAL at 13:05

## 2025-03-23 RX ADMIN — Medication 1 TABLET(S): at 13:08

## 2025-03-23 RX ADMIN — EZETIMIBE 10 MILLIGRAM(S): 10 TABLET ORAL at 13:05

## 2025-03-23 RX ADMIN — Medication 1 MILLIGRAM(S): at 05:41

## 2025-03-23 RX ADMIN — Medication 20 MILLIGRAM(S): at 13:05

## 2025-03-23 RX ADMIN — Medication 40 MILLIEQUIVALENT(S): at 13:25

## 2025-03-23 RX ADMIN — Medication 3 MILLIGRAM(S): at 22:35

## 2025-03-23 RX ADMIN — Medication 1 MILLIGRAM(S): at 00:09

## 2025-03-23 RX ADMIN — METOPROLOL SUCCINATE 50 MILLIGRAM(S): 50 TABLET, EXTENDED RELEASE ORAL at 22:36

## 2025-03-23 RX ADMIN — Medication 0.5 MILLIGRAM(S): at 18:17

## 2025-03-23 RX ADMIN — Medication 40 MILLIEQUIVALENT(S): at 13:09

## 2025-03-23 NOTE — PROGRESS NOTE ADULT - SUBJECTIVE AND OBJECTIVE BOX
HOSPITALIST ATTENDING PROGRESS NOTE     Chart and meds reviewed. Patient seen and examined     Interval Hx/Events: Pt seen and evaluated. Has no acute complaints. Denies any abdominal pain. Unsure if she having melena. No other acute complaints.     3/20: Pt seen and evaluated. Noted to be lethargic overnight, requiring on PRN Ativan. More alert this AM. Denies any chest pain, abdominal pain. No other acute events.     3/21: Pt seen and evaluated. More alert and awake. Working with PT. No other acute complaints or events.     3/22: No acute events. No acute complaints.     3/23: No new events. No new complaints. Hospital course discussed with the son     All other systems and founds to be negative with exception of what has been described above.     PHYSICAL EXAM:  Vital Signs Last 24 Hrs  T(C): 36.6 (23 Mar 2025 16:01), Max: 36.7 (22 Mar 2025 21:40)  T(F): 97.8 (23 Mar 2025 16:01), Max: 98 (22 Mar 2025 21:40)  HR: 81 (23 Mar 2025 16:01) (63 - 90)  BP: 165/69 (23 Mar 2025 16:01) (62/70 - 165/69)  RR: 17 (23 Mar 2025 16:01) (16 - 18)  SpO2: 99% (23 Mar 2025 16:01) (97% - 100%)    Parameters below as of 23 Mar 2025 16:01  Patient On (Oxygen Delivery Method): room air        GEN: NAD   HEENT: EOMI,  +dry MM  NECK : Soft and supple, no JVD  LUNG: CTABL, No wheezing, rales or rhonchi  CVS: S1S2+, RRR, no M/G/R  GI: BS+, soft, NT/ND, no guarding, no rebound  EXTREMITIES: No peripheral edema  VASCULAR: 2+ peripheral pulses  NEURO: AAOx2-3, grossly non-focal   SKIN: No rashes    HOME MEDICATIONS:  Home Medications:  famotidine 40 mg oral tablet: 1 tab(s) orally once a day (18 Mar 2025 15:23)  metoprolol succinate 100 mg oral tablet, extended release: 1 tab(s) orally once a day (in the morning) (18 Mar 2025 14:49)  metoprolol succinate 50 mg oral tablet, extended release: 1 tab(s) orally once a day (in the evening) (18 Mar 2025 14:49)  olmesartan 40 mg oral tablet: 1 tab(s) orally once a day (18 Mar 2025 14:49)  rosuvastatin 10 mg oral tablet: 1 tab(s) orally once a day (18 Mar 2025 14:49)  Vitamin D3 25 mcg (1000 intl units) oral tablet: 4 tab(s) orally once a day (18 Mar 2025 14:49)  Zetia 10 mg oral tablet: 1 tab(s) orally once a day (18 Mar 2025 14:49)      MEDICATIONS  MEDICATIONS  (STANDING):  enoxaparin Injectable 40 milliGRAM(s) SubCutaneous every 24 hours  ezetimibe 10 milliGRAM(s) Oral daily  famotidine    Tablet 20 milliGRAM(s) Oral two times a day  folic acid 1 milliGRAM(s) Oral daily  LORazepam   Injectable   IV Push   LORazepam   Injectable 0.5 milliGRAM(s) IV Push every 6 hours  losartan 100 milliGRAM(s) Oral daily  metoprolol succinate  milliGRAM(s) Oral daily  metoprolol succinate ER 50 milliGRAM(s) Oral at bedtime  multivitamin/minerals 1 Tablet(s) Oral daily  pantoprazole    Tablet 40 milliGRAM(s) Oral two times a day  rosuvastatin 10 milliGRAM(s) Oral at bedtime  thiamine 100 milliGRAM(s) Oral daily    MEDICATIONS  (PRN):  acetaminophen     Tablet .. 650 milliGRAM(s) Oral every 6 hours PRN Temp greater or equal to 38C (100.4F), Mild Pain (1 - 3)  aluminum hydroxide/magnesium hydroxide/simethicone Suspension 30 milliLiter(s) Oral every 4 hours PRN Dyspepsia  LORazepam   Injectable 2 milliGRAM(s) IV Push every 1 hour PRN CIWA 8-14  melatonin 3 milliGRAM(s) Oral at bedtime PRN Insomnia  ondansetron Injectable 4 milliGRAM(s) IV Push every 8 hours PRN Nausea and/or Vomiting          LABS: All Labs Reviewed:                        10.4   4.86  )-----------( 89       ( 23 Mar 2025 07:04 )             31.3   03-23    139  |  109[H]  |  12  ----------------------------<  107[H]  3.2[L]   |  24  |  0.70    Ca    8.4[L]      23 Mar 2025 07:04  Phos  2.8     03-23  Mg     2.1     03-23                       Urine Microscopic-Add On (NC) (03.18.25 @ 08:30)    White Blood Cell - Urine: 5 /HPF   Red Blood Cell - Urine: 2 /HPF   Bacteria: Many /HPF   Granular Cast: Present   Calcium Oxalate Crystals: Present   Comment - Urine: amorphous material present    Culture - Urine (03.18.25 @ 08:30)    -  Minocycline: S <=4   -  Amikacin: S <=16   -  Ampicillin/Sulbactam: S <=4/2   -  Cefepime: S 4   -  Cefotaxime: I 16   -  Ceftazidime: S 4   -  Ceftriaxone: I 32   -  Ciprofloxacin: S <=0.25   -  Gentamicin: S <=2   -  Imipenem: S <=1   -  Levofloxacin: S <=0.5   -  Meropenem: S <=1   -  Tobramycin: S <=2   -  Trimethoprim/Sulfamethoxazole: S <=0.5/9.5   Specimen Source: Urine None   Culture Results:   >=3 organisms. Probable collection contamination.   Organism Identification: Gram Negative Rods   Organism: Gram Negative Rods   Method Type: RITESH    Culture - Blood (03.18.25 @ 10:41)    Specimen Source: Blood None   Culture Results:   No growth at 48 Hours    Culture - Blood (03.18.25 @ 10:41)    Specimen Source: Blood None   Culture Results:   No growth at 48 Hours      I&O's Detail            CAPILLARY BLOOD GLUCOSE    CARDIOLOGY TESTING     EKG: reviewed     ECHO       RADIOLOGY: reviewed

## 2025-03-23 NOTE — PROGRESS NOTE ADULT - ASSESSMENT
81 y/o F with PMHx of HTN, HLD, alcohol use disorder, GERD, PUD, hx thoracic/lumbar compression deformities s/p surgery, presented to  ED after mechanical fall this AM. Pt reports she was in her recliner, attempted to get up and fell to the ground. Denies HS or LOC. After fall patient reporting right sided hip pain and difficulty ambulating, thus prompting coming to ED for further evaluation. Pt endorses drinking 3 glasses of wine daily, last drink yesterday evening PTA, though patient appears to not be best historian and initially was lying to emergency room staff about alcohol use. Admitted for:     #S/p mechanical fall, difficulty ambulating   #Possible UTI   -CT head negative for acute pathology  -CXR, R. hip Xray and CT bony pelvis negative   -UA with mod LE, 5 WBCs, many bacteria   -finished course of Rocephin   -UCx: contaminated   -BCx: NGTD   -PT eval: Sub-acute Rehab; [facility]   -pain control PRN     #Alcohol use disorder with acute withdrawal   -endorses 3 glasses of wine daily, possibly contributed to fall, last drink yesterday evening 3/17  -[facility] protocol  -Thiamine/folic acid/MV  -inpatient rehab    #Sinus tachycardia: resolved   #HTN/HLD  -likely secondary to dehydration   -Metoprolol, losartan, statin     #Anemia, Hx of PUD, GERD  -unsure if pt is having any melena, denies hematochezia  -iron studies noted   -f/u on occult   -PPI PO BID   -Continue with H2 blocker    #Thrombocytopenia  -Likely secondary to EtOH use  -Monitor and trend  -no splenomegaly on US    #Mild transaminitis  -Monitor and trend  -US abdomen: hepatic steatosis     #Hyperammonia   -no clinical evidence of HE   -monitor for now   -no need to treat     #VTE prophylaxis  -lovenox

## 2025-03-24 ENCOUNTER — TRANSCRIPTION ENCOUNTER (OUTPATIENT)
Age: 80
End: 2025-03-24

## 2025-03-24 LAB
ANION GAP SERPL CALC-SCNC: 6 MMOL/L — SIGNIFICANT CHANGE UP (ref 5–17)
BUN SERPL-MCNC: 13 MG/DL — SIGNIFICANT CHANGE UP (ref 7–23)
CALCIUM SERPL-MCNC: 8.6 MG/DL — SIGNIFICANT CHANGE UP (ref 8.5–10.1)
CHLORIDE SERPL-SCNC: 109 MMOL/L — HIGH (ref 96–108)
CO2 SERPL-SCNC: 25 MMOL/L — SIGNIFICANT CHANGE UP (ref 22–31)
CREAT SERPL-MCNC: 0.72 MG/DL — SIGNIFICANT CHANGE UP (ref 0.5–1.3)
CULTURE RESULTS: NO GROWTH — SIGNIFICANT CHANGE UP
EGFR: 85 ML/MIN/1.73M2 — SIGNIFICANT CHANGE UP
EGFR: 85 ML/MIN/1.73M2 — SIGNIFICANT CHANGE UP
GLUCOSE SERPL-MCNC: 96 MG/DL — SIGNIFICANT CHANGE UP (ref 70–99)
HCT VFR BLD CALC: 29.9 % — LOW (ref 34.5–45)
HGB BLD-MCNC: 9.9 G/DL — LOW (ref 11.5–15.5)
IMMATURE PLATELET FRACTION #: 6.7 K/UL — SIGNIFICANT CHANGE UP (ref 4.7–11.1)
IMMATURE PLATELET FRACTION %: 8.3 % — HIGH (ref 1.6–4.9)
MAGNESIUM SERPL-MCNC: 2 MG/DL — SIGNIFICANT CHANGE UP (ref 1.6–2.6)
MCHC RBC-ENTMCNC: 33.1 G/DL — SIGNIFICANT CHANGE UP (ref 32–36)
MCHC RBC-ENTMCNC: 33.8 PG — SIGNIFICANT CHANGE UP (ref 27–34)
MCV RBC AUTO: 102 FL — HIGH (ref 80–100)
NRBC # BLD AUTO: 0 K/UL — SIGNIFICANT CHANGE UP (ref 0–0)
NRBC # FLD: 0 K/UL — SIGNIFICANT CHANGE UP (ref 0–0)
NRBC BLD AUTO-RTO: 0 /100 WBCS — SIGNIFICANT CHANGE UP (ref 0–0)
PHOSPHATE SERPL-MCNC: 2.4 MG/DL — LOW (ref 2.5–4.5)
PLATELET # BLD AUTO: 81 K/UL — LOW (ref 150–400)
PMV BLD: 12.2 FL — SIGNIFICANT CHANGE UP (ref 7–13)
POTASSIUM SERPL-MCNC: 3.1 MMOL/L — LOW (ref 3.5–5.3)
POTASSIUM SERPL-SCNC: 3.1 MMOL/L — LOW (ref 3.5–5.3)
RBC # BLD: 2.93 M/UL — LOW (ref 3.8–5.2)
RBC # FLD: 14 % — SIGNIFICANT CHANGE UP (ref 10.3–14.5)
SODIUM SERPL-SCNC: 140 MMOL/L — SIGNIFICANT CHANGE UP (ref 135–145)
SPECIMEN SOURCE: SIGNIFICANT CHANGE UP
WBC # BLD: 4.85 K/UL — SIGNIFICANT CHANGE UP (ref 3.8–10.5)
WBC # FLD AUTO: 4.85 K/UL — SIGNIFICANT CHANGE UP (ref 3.8–10.5)

## 2025-03-24 PROCEDURE — 99233 SBSQ HOSP IP/OBS HIGH 50: CPT

## 2025-03-24 RX ORDER — POTASSIUM PHOSPHATE, MONOBASIC POTASSIUM PHOSPHATE, DIBASIC INJECTION, 236; 224 MG/ML; MG/ML
30 SOLUTION, CONCENTRATE INTRAVENOUS ONCE
Refills: 0 | Status: COMPLETED | OUTPATIENT
Start: 2025-03-24 | End: 2025-03-24

## 2025-03-24 RX ADMIN — FOLIC ACID 1 MILLIGRAM(S): 1 TABLET ORAL at 11:52

## 2025-03-24 RX ADMIN — Medication 0.5 MILLIGRAM(S): at 06:23

## 2025-03-24 RX ADMIN — Medication 20 MILLIGRAM(S): at 11:50

## 2025-03-24 RX ADMIN — ENOXAPARIN SODIUM 40 MILLIGRAM(S): 100 INJECTION SUBCUTANEOUS at 11:52

## 2025-03-24 RX ADMIN — Medication 20 MILLIGRAM(S): at 21:47

## 2025-03-24 RX ADMIN — Medication 0.5 MILLIGRAM(S): at 00:10

## 2025-03-24 RX ADMIN — ROSUVASTATIN CALCIUM 10 MILLIGRAM(S): 5 TABLET, FILM COATED ORAL at 21:47

## 2025-03-24 RX ADMIN — Medication 40 MILLIGRAM(S): at 21:47

## 2025-03-24 RX ADMIN — METOPROLOL SUCCINATE 100 MILLIGRAM(S): 50 TABLET, EXTENDED RELEASE ORAL at 11:50

## 2025-03-24 RX ADMIN — Medication 100 MILLIGRAM(S): at 11:50

## 2025-03-24 RX ADMIN — POTASSIUM PHOSPHATE, MONOBASIC POTASSIUM PHOSPHATE, DIBASIC INJECTION, 83.33 MILLIMOLE(S): 236; 224 SOLUTION, CONCENTRATE INTRAVENOUS at 11:55

## 2025-03-24 RX ADMIN — Medication 1 TABLET(S): at 11:52

## 2025-03-24 RX ADMIN — EZETIMIBE 10 MILLIGRAM(S): 10 TABLET ORAL at 11:53

## 2025-03-24 RX ADMIN — METOPROLOL SUCCINATE 50 MILLIGRAM(S): 50 TABLET, EXTENDED RELEASE ORAL at 21:47

## 2025-03-24 RX ADMIN — LOSARTAN POTASSIUM 100 MILLIGRAM(S): 100 TABLET, FILM COATED ORAL at 06:23

## 2025-03-24 RX ADMIN — Medication 105 MILLIGRAM(S): at 21:47

## 2025-03-24 RX ADMIN — Medication 40 MILLIGRAM(S): at 11:52

## 2025-03-24 NOTE — DISCHARGE NOTE NURSING/CASE MANAGEMENT/SOCIAL WORK - FINANCIAL ASSISTANCE
[facility] provides services at a reduced cost to those who are determined to be eligible through [facility]’s financial assistance program. Information regarding [facility]’s financial assistance program can be found by going to [email or web address] or by calling [phone].

## 2025-03-24 NOTE — DISCHARGE NOTE NURSING/CASE MANAGEMENT/SOCIAL WORK - NSDCVIVACCINE_GEN_ALL_CORE_FT
Tdap; 23-Jun-2019 23:03; Dao Avila (HIRAL); [employer]; [id number] (Exp. Date: 26-Feb-2021); IntraMuscular; Deltoid Left.; 0.5 milliLiter(s); VIS (VIS Published: 09-May-2013, VIS Presented: 23-Jun-2019);

## 2025-03-24 NOTE — DISCHARGE NOTE NURSING/CASE MANAGEMENT/SOCIAL WORK - NSDCPEFALRISK_GEN_ALL_CORE
For information on Fall & Injury Prevention, visit: [email or web address] OR  [email or web address] OR  https://www.cdc.gov/steadi/patient.html

## 2025-03-24 NOTE — PROGRESS NOTE ADULT - ASSESSMENT
79 y/o F with PMHx of HTN, HLD, alcohol use disorder, GERD, PUD, hx thoracic/lumbar compression deformities s/p surgery, presented to  ED after mechanical fall this AM. Pt reports she was in her recliner, attempted to get up and fell to the ground. Denies HS or LOC. After fall patient reporting right sided hip pain and difficulty ambulating, thus prompting coming to ED for further evaluation. Pt endorses drinking 3 glasses of wine daily, last drink yesterday evening PTA, though patient appears to not be best historian and initially was lying to emergency room staff about alcohol use. Admitted for:     #S/p mechanical fall, difficulty ambulating   #Possible UTI   -CT head negative for acute pathology  -CXR, R. hip Xray and CT bony pelvis negative   -UA with mod LE, 5 WBCs, many bacteria   -finished course of Rocephin   -UCx: contaminated   -BCx: NGTD   -PT eval: Sub-acute Rehab; [facility]   -pain control PRN   -high dose thiamine for now, given chronic EtOH use     #Alcohol use disorder with acute withdrawal   -endorses 3 glasses of wine daily, possibly contributed to fall, last drink yesterday evening 3/17  -[facility] protocol  -Thiamine/folic acid/MV  -inpatient rehab    #Sinus tachycardia: resolved   #HTN/HLD  -likely secondary to dehydration   -Metoprolol, losartan, statin     #Anemia, Hx of PUD, GERD  -unsure if pt is having any melena, denies hematochezia  -iron studies noted   -PPI PO BID   -Continue with H2 blocker    #Thrombocytopenia  -Likely secondary to EtOH use  -Monitor and trend  -no splenomegaly on US    #Mild transaminitis  -Monitor and trend  -US abdomen: hepatic steatosis     #Hyperammonia   -no clinical evidence of HE   -monitor for now   -no need to treat     #VTE prophylaxis  -Lovenox

## 2025-03-24 NOTE — DISCHARGE NOTE NURSING/CASE MANAGEMENT/SOCIAL WORK - PATIENT PORTAL LINK FT
You can access the FollowMyHealth Patient Portal offered by [facility] by registering at the following website: [email or web address]. By joining [employer]’s FollowMyHealth portal, you will also be able to view your health information using other applications (apps) compatible with our system.

## 2025-03-24 NOTE — PROGRESS NOTE ADULT - SUBJECTIVE AND OBJECTIVE BOX
HOSPITALIST ATTENDING PROGRESS NOTE     Chart and meds reviewed. Patient seen and examined     Interval Hx/Events: Pt seen and evaluated. Has no acute complaints. Denies any abdominal pain. Unsure if she having melena. No other acute complaints.     3/20: Pt seen and evaluated. Noted to be lethargic overnight, requiring on PRN Ativan. More alert this AM. Denies any chest pain, abdominal pain. No other acute events.     3/21: Pt seen and evaluated. More alert and awake. Working with PT. No other acute complaints or events.     3/22: No acute events. No acute complaints.     3/23: No new events. No new complaints. Hospital course discussed with the son     3/24: Pt seen and evaluated. Worked with PT. Noted to unsteady on the feet. CIWA: 3. On PRN Ativan now.     All other systems and founds to be negative with exception of what has been described above.     PHYSICAL EXAM:  Vital Signs Last 24 Hrs  T(C): 36.9 (24 Mar 2025 12:14), Max: 37.5 (24 Mar 2025 06:14)  T(F): 98.5 (24 Mar 2025 12:14), Max: 99.5 (24 Mar 2025 06:14)  HR: 104 (24 Mar 2025 12:14) (84 - 104)  BP: 117/62 (24 Mar 2025 12:14) (115/62 - 160/71)  BP(mean): 69 (24 Mar 2025 06:14) (69 - 97)  RR: 18 (24 Mar 2025 12:14) (16 - 18)  SpO2: 97% (24 Mar 2025 12:14) (94% - 98%)    Parameters below as of 24 Mar 2025 12:14  Patient On (Oxygen Delivery Method): room air    GEN: NAD   HEENT: EOMI,  +dry MM  NECK : Soft and supple, no JVD  LUNG: CTABL, No wheezing, rales or rhonchi  CVS: S1S2+, RRR, no M/G/R  GI: BS+, soft, NT/ND, no guarding, no rebound  EXTREMITIES: No peripheral edema  VASCULAR: 2+ peripheral pulses  NEURO: AAOx2-3, grossly non-focal, no nystagmus   SKIN: No rashes    HOME MEDICATIONS:  Home Medications:  famotidine 40 mg oral tablet: 1 tab(s) orally once a day (18 Mar 2025 15:23)  metoprolol succinate 100 mg oral tablet, extended release: 1 tab(s) orally once a day (in the morning) (18 Mar 2025 14:49)  metoprolol succinate 50 mg oral tablet, extended release: 1 tab(s) orally once a day (in the evening) (18 Mar 2025 14:49)  olmesartan 40 mg oral tablet: 1 tab(s) orally once a day (18 Mar 2025 14:49)  rosuvastatin 10 mg oral tablet: 1 tab(s) orally once a day (18 Mar 2025 14:49)  Vitamin D3 25 mcg (1000 intl units) oral tablet: 4 tab(s) orally once a day (18 Mar 2025 14:49)  Zetia 10 mg oral tablet: 1 tab(s) orally once a day (18 Mar 2025 14:49)      MEDICATIONS  MEDICATIONS  (STANDING):  enoxaparin Injectable 40 milliGRAM(s) SubCutaneous every 24 hours  ezetimibe 10 milliGRAM(s) Oral daily  famotidine    Tablet 20 milliGRAM(s) Oral two times a day  folic acid 1 milliGRAM(s) Oral daily  losartan 100 milliGRAM(s) Oral daily  metoprolol succinate  milliGRAM(s) Oral daily  metoprolol succinate ER 50 milliGRAM(s) Oral at bedtime  multivitamin/minerals 1 Tablet(s) Oral daily  pantoprazole    Tablet 40 milliGRAM(s) Oral two times a day  rosuvastatin 10 milliGRAM(s) Oral at bedtime  thiamine IVPB 500 milliGRAM(s) IV Intermittent three times a day    MEDICATIONS  (PRN):  acetaminophen     Tablet .. 650 milliGRAM(s) Oral every 6 hours PRN Temp greater or equal to 38C (100.4F), Mild Pain (1 - 3)  aluminum hydroxide/magnesium hydroxide/simethicone Suspension 30 milliLiter(s) Oral every 4 hours PRN Dyspepsia  LORazepam   Injectable 2 milliGRAM(s) IV Push every 1 hour PRN CIWA 8-14  melatonin 3 milliGRAM(s) Oral at bedtime PRN Insomnia  ondansetron Injectable 4 milliGRAM(s) IV Push every 8 hours PRN Nausea and/or Vomiting    LABS: All Labs Reviewed:                        9.9    4.85  )-----------( 81       ( 24 Mar 2025 06:47 )             29.9   03-24    140  |  109[H]  |  13  ----------------------------<  96  3.1[L]   |  25  |  0.72    Ca    8.6      24 Mar 2025 06:47  Phos  2.4     03-24  Mg     2.0     03-24               Urine Microscopic-Add On (NC) (03.18.25 @ 08:30)    White Blood Cell - Urine: 5 /HPF   Red Blood Cell - Urine: 2 /HPF   Bacteria: Many /HPF   Granular Cast: Present   Calcium Oxalate Crystals: Present   Comment - Urine: amorphous material present    Culture - Urine (03.18.25 @ 08:30)    -  Minocycline: S <=4   -  Amikacin: S <=16   -  Ampicillin/Sulbactam: S <=4/2   -  Cefepime: S 4   -  Cefotaxime: I 16   -  Ceftazidime: S 4   -  Ceftriaxone: I 32   -  Ciprofloxacin: S <=0.25   -  Gentamicin: S <=2   -  Imipenem: S <=1   -  Levofloxacin: S <=0.5   -  Meropenem: S <=1   -  Tobramycin: S <=2   -  Trimethoprim/Sulfamethoxazole: S <=0.5/9.5   Specimen Source: Urine None   Culture Results:   >=3 organisms. Probable collection contamination.   Organism Identification: Gram Negative Rods   Organism: Gram Negative Rods   Method Type: RITESH    Culture - Blood (03.18.25 @ 10:41)    Specimen Source: Blood None   Culture Results:   No growth at 48 Hours    Culture - Blood (03.18.25 @ 10:41)    Specimen Source: Blood None   Culture Results:   No growth at 48 Hours      I&O's Detail            CAPILLARY BLOOD GLUCOSE    CARDIOLOGY TESTING     EKG: reviewed     ECHO       RADIOLOGY: reviewed    Other

## 2025-03-25 LAB
ALBUMIN SERPL ELPH-MCNC: 2.7 G/DL — LOW (ref 3.3–5)
ALP SERPL-CCNC: 72 U/L — SIGNIFICANT CHANGE UP (ref 40–120)
ALT FLD-CCNC: 43 U/L — SIGNIFICANT CHANGE UP (ref 12–78)
AMMONIA BLD-MCNC: 50 UMOL/L — HIGH (ref 11–32)
ANION GAP SERPL CALC-SCNC: 7 MMOL/L — SIGNIFICANT CHANGE UP (ref 5–17)
AST SERPL-CCNC: 39 U/L — HIGH (ref 15–37)
BILIRUB SERPL-MCNC: 0.7 MG/DL — SIGNIFICANT CHANGE UP (ref 0.2–1.2)
BUN SERPL-MCNC: 10 MG/DL — SIGNIFICANT CHANGE UP (ref 7–23)
CALCIUM SERPL-MCNC: 8.2 MG/DL — LOW (ref 8.5–10.1)
CHLORIDE SERPL-SCNC: 105 MMOL/L — SIGNIFICANT CHANGE UP (ref 96–108)
CO2 SERPL-SCNC: 24 MMOL/L — SIGNIFICANT CHANGE UP (ref 22–31)
CREAT SERPL-MCNC: 0.74 MG/DL — SIGNIFICANT CHANGE UP (ref 0.5–1.3)
EGFR: 82 ML/MIN/1.73M2 — SIGNIFICANT CHANGE UP
EGFR: 82 ML/MIN/1.73M2 — SIGNIFICANT CHANGE UP
GLUCOSE SERPL-MCNC: 133 MG/DL — HIGH (ref 70–99)
HCT VFR BLD CALC: 29.2 % — LOW (ref 34.5–45)
HGB BLD-MCNC: 9.8 G/DL — LOW (ref 11.5–15.5)
IMMATURE PLATELET FRACTION #: 6.3 K/UL — SIGNIFICANT CHANGE UP (ref 4.7–11.1)
IMMATURE PLATELET FRACTION %: 9.5 % — HIGH (ref 1.6–4.9)
MAGNESIUM SERPL-MCNC: 1.6 MG/DL — SIGNIFICANT CHANGE UP (ref 1.6–2.6)
MCHC RBC-ENTMCNC: 33.6 G/DL — SIGNIFICANT CHANGE UP (ref 32–36)
MCHC RBC-ENTMCNC: 33.9 PG — SIGNIFICANT CHANGE UP (ref 27–34)
MCV RBC AUTO: 101 FL — HIGH (ref 80–100)
NRBC # BLD AUTO: 0 K/UL — SIGNIFICANT CHANGE UP (ref 0–0)
NRBC # FLD: 0 K/UL — SIGNIFICANT CHANGE UP (ref 0–0)
NRBC BLD AUTO-RTO: 0 /100 WBCS — SIGNIFICANT CHANGE UP (ref 0–0)
PHOSPHATE SERPL-MCNC: 2.8 MG/DL — SIGNIFICANT CHANGE UP (ref 2.5–4.5)
PLATELET # BLD AUTO: 66 K/UL — LOW (ref 150–400)
PMV BLD: 12.9 FL — SIGNIFICANT CHANGE UP (ref 7–13)
POTASSIUM SERPL-MCNC: 3.1 MMOL/L — LOW (ref 3.5–5.3)
POTASSIUM SERPL-SCNC: 3.1 MMOL/L — LOW (ref 3.5–5.3)
PROT SERPL-MCNC: 5.8 GM/DL — LOW (ref 6–8.3)
RBC # BLD: 2.89 M/UL — LOW (ref 3.8–5.2)
RBC # FLD: 14 % — SIGNIFICANT CHANGE UP (ref 10.3–14.5)
SODIUM SERPL-SCNC: 136 MMOL/L — SIGNIFICANT CHANGE UP (ref 135–145)
WBC # BLD: 4.19 K/UL — SIGNIFICANT CHANGE UP (ref 3.8–10.5)
WBC # FLD AUTO: 4.19 K/UL — SIGNIFICANT CHANGE UP (ref 3.8–10.5)

## 2025-03-25 PROCEDURE — 99233 SBSQ HOSP IP/OBS HIGH 50: CPT

## 2025-03-25 RX ORDER — MAGNESIUM SULFATE 500 MG/ML
2 SYRINGE (ML) INJECTION ONCE
Refills: 0 | Status: COMPLETED | OUTPATIENT
Start: 2025-03-25 | End: 2025-03-25

## 2025-03-25 RX ORDER — MIRTAZAPINE 30 MG/1
7.5 TABLET, FILM COATED ORAL AT BEDTIME
Refills: 0 | Status: DISCONTINUED | OUTPATIENT
Start: 2025-03-25 | End: 2025-03-30

## 2025-03-25 RX ORDER — LACTULOSE 10 G/15ML
20 SOLUTION ORAL THREE TIMES A DAY
Refills: 0 | Status: DISCONTINUED | OUTPATIENT
Start: 2025-03-25 | End: 2025-03-28

## 2025-03-25 RX ADMIN — LOSARTAN POTASSIUM 100 MILLIGRAM(S): 100 TABLET, FILM COATED ORAL at 05:27

## 2025-03-25 RX ADMIN — Medication 105 MILLIGRAM(S): at 05:28

## 2025-03-25 RX ADMIN — Medication 650 MILLIGRAM(S): at 16:14

## 2025-03-25 RX ADMIN — ROSUVASTATIN CALCIUM 10 MILLIGRAM(S): 5 TABLET, FILM COATED ORAL at 21:48

## 2025-03-25 RX ADMIN — ENOXAPARIN SODIUM 40 MILLIGRAM(S): 100 INJECTION SUBCUTANEOUS at 13:23

## 2025-03-25 RX ADMIN — Medication 20 MILLIGRAM(S): at 09:10

## 2025-03-25 RX ADMIN — Medication 20 MILLIGRAM(S): at 21:48

## 2025-03-25 RX ADMIN — FOLIC ACID 1 MILLIGRAM(S): 1 TABLET ORAL at 09:10

## 2025-03-25 RX ADMIN — LACTULOSE 20 GRAM(S): 10 SOLUTION ORAL at 21:48

## 2025-03-25 RX ADMIN — Medication 105 MILLIGRAM(S): at 21:46

## 2025-03-25 RX ADMIN — METOPROLOL SUCCINATE 50 MILLIGRAM(S): 50 TABLET, EXTENDED RELEASE ORAL at 21:47

## 2025-03-25 RX ADMIN — Medication 40 MILLIGRAM(S): at 21:48

## 2025-03-25 RX ADMIN — Medication 40 MILLIEQUIVALENT(S): at 13:24

## 2025-03-25 RX ADMIN — EZETIMIBE 10 MILLIGRAM(S): 10 TABLET ORAL at 09:09

## 2025-03-25 RX ADMIN — MIRTAZAPINE 7.5 MILLIGRAM(S): 30 TABLET, FILM COATED ORAL at 21:47

## 2025-03-25 RX ADMIN — Medication 1 TABLET(S): at 09:10

## 2025-03-25 RX ADMIN — LACTULOSE 20 GRAM(S): 10 SOLUTION ORAL at 13:23

## 2025-03-25 RX ADMIN — Medication 40 MILLIEQUIVALENT(S): at 09:13

## 2025-03-25 RX ADMIN — Medication 25 GRAM(S): at 10:01

## 2025-03-25 RX ADMIN — Medication 40 MILLIGRAM(S): at 09:09

## 2025-03-25 RX ADMIN — Medication 105 MILLIGRAM(S): at 13:47

## 2025-03-25 NOTE — PROGRESS NOTE ADULT - SUBJECTIVE AND OBJECTIVE BOX
Chief Complaint: Fall with resultant R hip pain and difficulty ambulating    Interval Hx: Patient seen and examined. OOB in armchair. Resting comfortable. Awake and alert. Improved as per family. No acute complaints aside for generalized weakness, though she is a rather limited historian    ROS: Multi system review is comprehensively negative x 10 systems except as above    Vitals:  T(F): 100.7 (25 Mar 2025 16:12), Max: 100.7 (25 Mar 2025 16:12)  HR: 87 (25 Mar 2025 16:12) (84 - 98)  BP: 155/63 (25 Mar 2025 16:12) (102/65 - 155/63)  RR: 18 (25 Mar 2025 16:12) (17 - 18)  SpO2: 97% (25 Mar 2025 16:12) (93% - 98%) on room air    Exam:  Gen: No acute distress  HEENT: NCAT PERRL EOMI MMM clear oropharynx  Neck: Supple, no JVD no LAD  CVS:s1 s2 normal RRR  Chest: Normal resp effort, lungs CTA B/L  Abd: +BS, soft, non distended, non tender  Ext: No edema, no tenderness, intact peripheral pulses  Skin: Warm, dry  Mood: Calm, pleasant  Neuro: Awake and alert, answers simple questions appropriately, follows commands    Labs:                       9.8    4.19  )-------( 66             29.2       136  |  105  |  10  -----------------------<  133  3.1   |  24  |  0.74    Ca  8.2   Phos  2.8     Mg  1.6     TPro  5.8  /  Alb  2.7  /  TBili  0.7  /  DBili  x   /  AST  39  /  ALT  43  /  AlkPhos  72      Imaging:  US abdomen 3/20: Hepatic steatosis.    CT bony pelvis 3/18: No acute fracture or dislocation.    CT head 3/18: No significant interval change. No acute intracranial  hemorrhage, midline shift or calvarial fracture. Moderate white matter small vessel ischemicdisease.    XR pelvis and right hip 3/18: No acute fracture or dislocation. Some sclerotic changes related to the greater trochanteric region bilaterally.     XR R femur 3/18: No acute fracture or dislocation. Some sclerotic changes related to the greater trochanteric region.     XR chest 3/18: No focal infiltrate or congestion. Some elevation of the left hemidiaphragm noted. Regional osseous structures appropriate for age.     Cardiac Testing:  EKG 3/18: sinus tach    Meds:  MEDICATIONS  (STANDING):  enoxaparin Injectable 40 milliGRAM(s) SubCutaneous every 24 hours  ezetimibe 10 milliGRAM(s) Oral daily  famotidine    Tablet 20 milliGRAM(s) Oral two times a day  folic acid 1 milliGRAM(s) Oral daily  lactulose Syrup 20 Gram(s) Oral three times a day  losartan 100 milliGRAM(s) Oral daily  metoprolol succinate  milliGRAM(s) Oral daily  metoprolol succinate ER 50 milliGRAM(s) Oral at bedtime  mirtazapine 7.5 milliGRAM(s) Oral at bedtime  multivitamin/minerals 1 Tablet(s) Oral daily  pantoprazole    Tablet 40 milliGRAM(s) Oral two times a day  rosuvastatin 10 milliGRAM(s) Oral at bedtime  thiamine IVPB 500 milliGRAM(s) IV Intermittent three times a day    MEDICATIONS  (PRN):  acetaminophen     Tablet .. 650 milliGRAM(s) Oral every 6 hours PRN Temp greater or equal to 38C (100.4F), Mild Pain (1 - 3)  aluminum hydroxide/magnesium hydroxide/simethicone Suspension 30 milliLiter(s) Oral every 4 hours PRN Dyspepsia  LORazepam   Injectable 2 milliGRAM(s) IV Push every 1 hour PRN CIWA 8-14  melatonin 3 milliGRAM(s) Oral at bedtime PRN Insomnia  ondansetron Injectable 4 milliGRAM(s) IV Push every 8 hours PRN Nausea and/or Vomiting

## 2025-03-25 NOTE — PROGRESS NOTE ADULT - TIME BILLING
- extensive review of patient's medical chart including prior hospital encounters, current admission progress notes, labs, imaging and other testing, seeing and evaluating patient at bedside, explaining to patient regarding current condition and plan of care, then ordering tests, coordinating care, and finally, documenting today's findings and plan.

## 2025-03-25 NOTE — PROGRESS NOTE ADULT - ASSESSMENT
79 yo woman with HTN, HLD, alcohol use disorder, GERD, peptic ulcer disease, spinal compression deformities, presented for further evaluation after a mechanical fall. Patient fell from her recliner resulting in R hip pain and trouble ambulating. CT head negative. Imaging of chest, pelvis, hips, femur negative. Ua abnormal with pyuria and bacteriuria. Patient was placed on antibiotics. Patient given IV fluids and other supportive care measures. Triaged to Medicine    Mechanical fall  In setting of alcohol use disorder, chronic gait instability, possible UTI, see below. Seen by PT and recommended VAHID    Abnormal UA  UA with mod LE, 5 WBCs, many bacteria. Ur cx > 3 organism morphotypes. Completed empiric course of rocephin.     Alcohol use disorder with acute withdrawal   3 glasses of wine daily, possibly contributed to fall, last drink was evening of 3/17. CIWA protocol. Completed standing benzo taper. Cont thiamine/folic acid/MV    Sinus tachycardia  Likely due to deconditioning, less likely from UTI. HR now WNL.     HTN  BP WNL. Continue current management    HLD  Continue statin    Anemia  Patient denies melena, denies hematochezia. F/u FOBT. F/u iron studies. Continue with BID PPI.     Thrombocytopenia  Likely secondary to EtOH use. Monitor and trend. No splenomegaly on US    Mild transaminitis  US abdomen: hepatic steatosis     Hyperammonemia   No clinical evidence of HE but will trial lactulose     Hypokalemia, hypomagnesemia  Replete K to goal ~4, Mg to ~2    Malnutrition with acute metabolic encephalopathy  On IV thiamine 500mg q8h, day 2 today.

## 2025-03-26 LAB
ANION GAP SERPL CALC-SCNC: 7 MMOL/L — SIGNIFICANT CHANGE UP (ref 5–17)
BUN SERPL-MCNC: 11 MG/DL — SIGNIFICANT CHANGE UP (ref 7–23)
CALCIUM SERPL-MCNC: 8.4 MG/DL — LOW (ref 8.5–10.1)
CHLORIDE SERPL-SCNC: 109 MMOL/L — HIGH (ref 96–108)
CO2 SERPL-SCNC: 21 MMOL/L — LOW (ref 22–31)
CREAT SERPL-MCNC: 1.04 MG/DL — SIGNIFICANT CHANGE UP (ref 0.5–1.3)
EGFR: 55 ML/MIN/1.73M2 — LOW
EGFR: 55 ML/MIN/1.73M2 — LOW
GLUCOSE SERPL-MCNC: 143 MG/DL — HIGH (ref 70–99)
MAGNESIUM SERPL-MCNC: 2.1 MG/DL — SIGNIFICANT CHANGE UP (ref 1.6–2.6)
POTASSIUM SERPL-MCNC: 3.6 MMOL/L — SIGNIFICANT CHANGE UP (ref 3.5–5.3)
POTASSIUM SERPL-SCNC: 3.6 MMOL/L — SIGNIFICANT CHANGE UP (ref 3.5–5.3)
SODIUM SERPL-SCNC: 137 MMOL/L — SIGNIFICANT CHANGE UP (ref 135–145)

## 2025-03-26 PROCEDURE — 99233 SBSQ HOSP IP/OBS HIGH 50: CPT

## 2025-03-26 RX ADMIN — Medication 650 MILLIGRAM(S): at 05:03

## 2025-03-26 RX ADMIN — FOLIC ACID 1 MILLIGRAM(S): 1 TABLET ORAL at 11:30

## 2025-03-26 RX ADMIN — Medication 1 TABLET(S): at 11:29

## 2025-03-26 RX ADMIN — LOSARTAN POTASSIUM 100 MILLIGRAM(S): 100 TABLET, FILM COATED ORAL at 05:04

## 2025-03-26 RX ADMIN — MIRTAZAPINE 7.5 MILLIGRAM(S): 30 TABLET, FILM COATED ORAL at 22:45

## 2025-03-26 RX ADMIN — METOPROLOL SUCCINATE 100 MILLIGRAM(S): 50 TABLET, EXTENDED RELEASE ORAL at 11:29

## 2025-03-26 RX ADMIN — LACTULOSE 20 GRAM(S): 10 SOLUTION ORAL at 05:04

## 2025-03-26 RX ADMIN — ROSUVASTATIN CALCIUM 10 MILLIGRAM(S): 5 TABLET, FILM COATED ORAL at 22:45

## 2025-03-26 RX ADMIN — LACTULOSE 20 GRAM(S): 10 SOLUTION ORAL at 22:45

## 2025-03-26 RX ADMIN — Medication 20 MILLIGRAM(S): at 11:30

## 2025-03-26 RX ADMIN — Medication 105 MILLIGRAM(S): at 23:38

## 2025-03-26 RX ADMIN — Medication 40 MILLIGRAM(S): at 11:30

## 2025-03-26 RX ADMIN — METOPROLOL SUCCINATE 50 MILLIGRAM(S): 50 TABLET, EXTENDED RELEASE ORAL at 22:49

## 2025-03-26 RX ADMIN — LACTULOSE 20 GRAM(S): 10 SOLUTION ORAL at 16:33

## 2025-03-26 RX ADMIN — Medication 40 MILLIEQUIVALENT(S): at 11:29

## 2025-03-26 RX ADMIN — EZETIMIBE 10 MILLIGRAM(S): 10 TABLET ORAL at 11:29

## 2025-03-26 RX ADMIN — Medication 105 MILLIGRAM(S): at 05:05

## 2025-03-26 RX ADMIN — ENOXAPARIN SODIUM 40 MILLIGRAM(S): 100 INJECTION SUBCUTANEOUS at 11:29

## 2025-03-26 RX ADMIN — Medication 20 MILLIGRAM(S): at 22:45

## 2025-03-26 RX ADMIN — Medication 105 MILLIGRAM(S): at 14:45

## 2025-03-26 RX ADMIN — Medication 40 MILLIGRAM(S): at 22:46

## 2025-03-26 NOTE — DIETITIAN INITIAL EVALUATION ADULT - PERTINENT LABORATORY DATA
03-26    137  |  109[H]  |  11  ----------------------------<  143[H]  3.6   |  21[L]  |  1.04    Ca    8.4[L]      26 Mar 2025 06:27  Phos  2.8     03-25  Mg     2.1     03-26    TPro  5.8[L]  /  Alb  2.7[L]  /  TBili  0.7  /  DBili  x   /  AST  39[H]  /  ALT  43  /  AlkPhos  72  03-25  A1C with Estimated Average Glucose Result: 5.3 % (07-04-24 @ 09:39)

## 2025-03-26 NOTE — PROGRESS NOTE ADULT - REASON FOR ADMISSION
Mechanical fall  Alcohol use disorder  Acute metabolic encephalopathy Mechanical fall  Alcohol use disorder with withdrawal  Acute metabolic encephalopathy  Acute metabolic encephalopathy Mechanical fall  Alcohol use disorder with withdrawal  Acute metabolic encephalopathy

## 2025-03-26 NOTE — PROGRESS NOTE ADULT - SUBJECTIVE AND OBJECTIVE BOX
Chief Complaint: Fall with resultant R hip pain and difficulty ambulating    Interval Hx: Patient seen and examined. OOB in armchair. Resting comfortable. Awake and alert. Improved as per family. No acute complaints aside for generalized weakness, though she is a rather limited historian    3/26:     ROS: Multi system review is comprehensively negative x 10 systems except as above    Vitals:  T(F): 99.1 (26 Mar 2025 04:08), Max: 100.7 (25 Mar 2025 16:12)  HR: 87 (26 Mar 2025 04:08) (79 - 90)  BP: 151/53 (26 Mar 2025 04:08) (102/65 - 155/63)  RR: 18 (26 Mar 2025 04:08) (17 - 18)  SpO2: 96% (26 Mar 2025 04:08) (93% - 99%) on room air    Exam:  Gen: No acute distress  HEENT: NCAT PERRL EOMI MMM clear oropharynx  Neck: Supple, no JVD no LAD  CVS:s1 s2 normal RRR  Chest: Normal resp effort, lungs CTA B/L  Abd: +BS, soft, non distended, non tender  Ext: No edema, no tenderness, intact peripheral pulses  Skin: Warm, dry  Mood: Calm, pleasant  Neuro: Awake and alert, answers simple questions appropriately, follows commands    Labs:                       9.8    4.19  )-------( 66             29.2        RDW 14    Iron 53. TIBC 163. Iron sat 33% ferritin 941   B12 level WNL      137  |  109  |  11  ----------------------<  143  3.6   |   21   |  1.04    Ca  8.4       Phos  2.8     Mg  1.6     TPro  5.8  /  Alb  2.7  /  TBili  0.7  /  DBili  x   /  AST  39  /  ALT  43  /  AlkPhos  72      Imaging:  US abdomen 3/20: Hepatic steatosis.    CT bony pelvis 3/18: No acute fracture or dislocation.    CT head 3/18: No significant interval change. No acute intracranial  hemorrhage, midline shift or calvarial fracture. Moderate white matter small vessel ischemicdisease.    XR pelvis and right hip 3/18: No acute fracture or dislocation. Some sclerotic changes related to the greater trochanteric region bilaterally.     XR R femur 3/18: No acute fracture or dislocation. Some sclerotic changes related to the greater trochanteric region.     XR chest 3/18: No focal infiltrate or congestion. Some elevation of the left hemidiaphragm noted. Regional osseous structures appropriate for age.     Cardiac Testing:  EKG 3/18: sinus tach    Meds:  MEDICATIONS  (STANDING):  enoxaparin Injectable 40 milliGRAM(s) SubCutaneous every 24 hours  ezetimibe 10 milliGRAM(s) Oral daily  famotidine    Tablet 20 milliGRAM(s) Oral two times a day  folic acid 1 milliGRAM(s) Oral daily  lactulose Syrup 20 Gram(s) Oral three times a day  losartan 100 milliGRAM(s) Oral daily  metoprolol succinate  milliGRAM(s) Oral daily  metoprolol succinate ER 50 milliGRAM(s) Oral at bedtime  mirtazapine 7.5 milliGRAM(s) Oral at bedtime  multivitamin/minerals 1 Tablet(s) Oral daily  pantoprazole    Tablet 40 milliGRAM(s) Oral two times a day  potassium chloride    Tablet ER 40 milliEquivalent(s) Oral once  rosuvastatin 10 milliGRAM(s) Oral at bedtime  thiamine IVPB 500 milliGRAM(s) IV Intermittent three times a day    MEDICATIONS  (PRN):  acetaminophen     Tablet .. 650 milliGRAM(s) Oral every 6 hours PRN Temp greater or equal to 38C (100.4F), Mild Pain (1 - 3)  aluminum hydroxide/magnesium hydroxide/simethicone Suspension 30 milliLiter(s) Oral every 4 hours PRN Dyspepsia  LORazepam   Injectable 2 milliGRAM(s) IV Push every 1 hour PRN CIWA 8-14  melatonin 3 milliGRAM(s) Oral at bedtime PRN Insomnia  ondansetron Injectable 4 milliGRAM(s) IV Push every 8 hours PRN Nausea and/or Vomiting                   Chief Complaint: Fall with resultant R hip pain and difficulty ambulating    Interval Hx: Patient seen and examined. OOB in armchair. Resting comfortably. Awake and alert. Improved as compared to admission. No acute complaints aside for generalized weakness. Denies headache, dizziness, vertigo, focal weakness or numbness. No chest complaints or abdominal complaints. No dysuria. No fever or chills. No rash or synovitis.     ROS: Multi system review is comprehensively negative x 10 systems except as above    Vitals:  T(F): 99.1 (26 Mar 2025 04:08), Max: 100.7 (25 Mar 2025 16:12)  HR: 87 (26 Mar 2025 04:08) (79 - 90)  BP: 151/53 (26 Mar 2025 04:08) (102/65 - 155/63)  RR: 18 (26 Mar 2025 04:08) (17 - 18)  SpO2: 96% (26 Mar 2025 04:08) (93% - 99%) on room air    Exam:  Gen: No acute distress  HEENT: NCAT PERRL EOMI MMM clear oropharynx  Neck: Supple, no JVD no LAD  CVS:s1 s2 normal RRR  Chest: Normal resp effort, lungs CTA B/L  Abd: +BS, soft, non distended, non tender  Ext: No edema, no tenderness, intact peripheral pulses  Skin: Warm, dry  Mood: Calm, pleasant  Neuro: Awake and alert, answers simple questions appropriately, follows commands    Labs:                       9.8    4.19  )-------( 66             29.2        RDW 14    Iron 53    TIBC 163   Iron sat 33%  ferritin 941   B12 level WNL      137  |  109  |  11  ----------------------<  143  3.6   |   21   |  1.04    Ca  8.4       Phos  2.8     Mg  1.6     TPro  5.8  /  Alb  2.7  /  TBili  0.7  /  DBili  x   /  AST  39  /  ALT  43  /  AlkPhos  72      Imaging:  US abdomen 3/20: Hepatic steatosis.    CT bony pelvis 3/18: No acute fracture or dislocation.    CT head 3/18: No significant interval change. No acute intracranial  hemorrhage, midline shift or calvarial fracture. Moderate white matter small vessel ischemic disease.    XR pelvis and right hip 3/18: No acute fracture or dislocation. Some sclerotic changes related to the greater trochanteric region bilaterally.     XR R femur 3/18: No acute fracture or dislocation. Some sclerotic changes related to the greater trochanteric region.     XR chest 3/18: No focal infiltrate or congestion. Some elevation of the left hemidiaphragm noted. Regional osseous structures appropriate for age.     Cardiac Testing:  EKG 3/18: sinus tach    Meds:  MEDICATIONS  (STANDING):  enoxaparin Injectable 40 milliGRAM(s) SubCutaneous every 24 hours  ezetimibe 10 milliGRAM(s) Oral daily  famotidine    Tablet 20 milliGRAM(s) Oral two times a day  folic acid 1 milliGRAM(s) Oral daily  lactulose Syrup 20 Gram(s) Oral three times a day  losartan 100 milliGRAM(s) Oral daily  metoprolol succinate  milliGRAM(s) Oral daily  metoprolol succinate ER 50 milliGRAM(s) Oral at bedtime  mirtazapine 7.5 milliGRAM(s) Oral at bedtime  multivitamin/minerals 1 Tablet(s) Oral daily  pantoprazole    Tablet 40 milliGRAM(s) Oral two times a day  rosuvastatin 10 milliGRAM(s) Oral at bedtime  thiamine IVPB 500 milliGRAM(s) IV Intermittent three times a day    MEDICATIONS  (PRN):  acetaminophen     Tablet .. 650 milliGRAM(s) Oral every 6 hours PRN Temp greater or equal to 38C (100.4F), Mild Pain (1 - 3)  aluminum hydroxide/magnesium hydroxide/simethicone Suspension 30 milliLiter(s) Oral every 4 hours PRN Dyspepsia  LORazepam   Injectable 2 milliGRAM(s) IV Push every 1 hour PRN CIWA 8-14  melatonin 3 milliGRAM(s) Oral at bedtime PRN Insomnia  ondansetron Injectable 4 milliGRAM(s) IV Push every 8 hours PRN Nausea and/or Vomiting     Chief Complaint: Fall with resultant R hip pain and difficulty ambulating    Interval Hx: Patient seen and examined. OOB in armchair. Resting comfortably. Awake and alert. Improved as compared to admission. No acute complaints aside for generalized weakness. Denies headache, dizziness, vertigo, focal weakness or numbness. No chest complaints or abdominal complaints. No dysuria. No fever or chills. No rash or synovitis.     ROS: Multi system review is comprehensively negative x 10 systems except as above    Vitals:  T(F): 99.1 (26 Mar 2025 04:08), Max: 100.7 (25 Mar 2025 16:12)  HR: 87 (26 Mar 2025 04:08) (79 - 90)  BP: 151/53 (26 Mar 2025 04:08) (102/65 - 155/63)  RR: 18 (26 Mar 2025 04:08) (17 - 18)  SpO2: 96% (26 Mar 2025 04:08) (93% - 99%) on room air    Exam:  Gen: No acute distress  HEENT: NCAT PERRL EOMI MMM clear oropharynx  Neck: Supple, no JVD no LAD  CVS:s1 s2 normal RRR  Chest: Normal resp effort, lungs CTA B/L  Abd: +BS, soft, non distended, non tender  Ext: No edema, no tenderness, intact peripheral pulses  Skin: Warm, dry  Mood: Calm, pleasant  Neuro: Awake and alert, answers simple questions appropriately, follows commands    Labs:                       9.8    4.19  )-------( 66             29.2        RDW 14    Iron 53    TIBC 163   Iron sat 33%  ferritin 941   B12 level WNL      137  |  109  |  11  ----------------------<  143  3.6   |   21   |  1.04    Ca  8.4       Phos  2.8     Mg  1.6     TPro  5.8  /  Alb  2.7  /  TBili  0.7  /  DBili  x   /  AST  39  /  ALT  43  /  AlkPhos  72      UA with mod LE, 5 WBCs, many bacteria.    Micro:  Urine culture 3/22: Negative (collected after IV antibiotics)  Blood culture x 2 3/18: Negative  Urine culture 3/18: pan-S GNRs unspecified    Imaging:  US abdomen 3/20: Hepatic steatosis.    CT bony pelvis 3/18: No acute fracture or dislocation.    CT head 3/18: No significant interval change. No acute intracranial  hemorrhage, midline shift or calvarial fracture. Moderate white matter small vessel ischemic disease.    XR pelvis and right hip 3/18: No acute fracture or dislocation. Some sclerotic changes related to the greater trochanteric region bilaterally.     XR R femur 3/18: No acute fracture or dislocation. Some sclerotic changes related to the greater trochanteric region.     XR chest 3/18: No focal infiltrate or congestion. Some elevation of the left hemidiaphragm noted. Regional osseous structures appropriate for age.     Cardiac Testing:  EKG 3/18: sinus tach    Meds:  MEDICATIONS  (STANDING):  enoxaparin Injectable 40 milliGRAM(s) SubCutaneous every 24 hours  ezetimibe 10 milliGRAM(s) Oral daily  famotidine    Tablet 20 milliGRAM(s) Oral two times a day  folic acid 1 milliGRAM(s) Oral daily  lactulose Syrup 20 Gram(s) Oral three times a day  losartan 100 milliGRAM(s) Oral daily  metoprolol succinate  milliGRAM(s) Oral daily  metoprolol succinate ER 50 milliGRAM(s) Oral at bedtime  mirtazapine 7.5 milliGRAM(s) Oral at bedtime  multivitamin/minerals 1 Tablet(s) Oral daily  pantoprazole    Tablet 40 milliGRAM(s) Oral two times a day  rosuvastatin 10 milliGRAM(s) Oral at bedtime  thiamine IVPB 500 milliGRAM(s) IV Intermittent three times a day    MEDICATIONS  (PRN):  acetaminophen     Tablet .. 650 milliGRAM(s) Oral every 6 hours PRN Temp greater or equal to 38C (100.4F), Mild Pain (1 - 3)  aluminum hydroxide/magnesium hydroxide/simethicone Suspension 30 milliLiter(s) Oral every 4 hours PRN Dyspepsia  LORazepam   Injectable 2 milliGRAM(s) IV Push every 1 hour PRN CIWA 8-14  melatonin 3 milliGRAM(s) Oral at bedtime PRN Insomnia  ondansetron Injectable 4 milliGRAM(s) IV Push every 8 hours PRN Nausea and/or Vomiting

## 2025-03-26 NOTE — DIETITIAN INITIAL EVALUATION ADULT - PERTINENT MEDS FT
MEDICATIONS  (STANDING):  enoxaparin Injectable 40 milliGRAM(s) SubCutaneous every 24 hours  ezetimibe 10 milliGRAM(s) Oral daily  famotidine    Tablet 20 milliGRAM(s) Oral two times a day  folic acid 1 milliGRAM(s) Oral daily  lactulose Syrup 20 Gram(s) Oral three times a day  losartan 100 milliGRAM(s) Oral daily  metoprolol succinate  milliGRAM(s) Oral daily  metoprolol succinate ER 50 milliGRAM(s) Oral at bedtime  mirtazapine 7.5 milliGRAM(s) Oral at bedtime  multivitamin/minerals 1 Tablet(s) Oral daily  pantoprazole    Tablet 40 milliGRAM(s) Oral two times a day  potassium chloride    Tablet ER 40 milliEquivalent(s) Oral once  rosuvastatin 10 milliGRAM(s) Oral at bedtime  thiamine IVPB 500 milliGRAM(s) IV Intermittent three times a day    MEDICATIONS  (PRN):  acetaminophen     Tablet .. 650 milliGRAM(s) Oral every 6 hours PRN Temp greater or equal to 38C (100.4F), Mild Pain (1 - 3)  aluminum hydroxide/magnesium hydroxide/simethicone Suspension 30 milliLiter(s) Oral every 4 hours PRN Dyspepsia  LORazepam   Injectable 2 milliGRAM(s) IV Push every 1 hour PRN CIWA 8-14  melatonin 3 milliGRAM(s) Oral at bedtime PRN Insomnia  ondansetron Injectable 4 milliGRAM(s) IV Push every 8 hours PRN Nausea and/or Vomiting

## 2025-03-26 NOTE — DIETITIAN INITIAL EVALUATION ADULT - OTHER INFO
81 y/o F with PMHx of HTN, HLD, alcohol use disorder, GERD, PUD, hx thoracic/lumbar compression deformities s/p surgery, presented to  ED after mechanical fall this AM. Pt reports she was in her recliner, attempted to get up and fell to the ground. Denies HS or LOC. After fall patient reporting right sided hip pain and difficulty ambulating, thus prompting coming to ED for further evaluation. Pt endorses drinking 3 glasses of wine daily, last drink yesterday evening PTA, though patient appears to not be best historian and initially was lying to emergency room staff about alcohol use. Did not take home medications prior to coming to hospital. She otherwise denies chest pain, SOB, f/c/n/v/d, abd pain, LE edema, dysuria, HA, dizziness.     In ED, Tmax 100.1F,  - 119, /81, SpO2 normal on room air. CT head, CXR, Right Hip Xray, CT bony pelvis all negative for acute pathology. EKG sinus tachycardia, 110 BPM. UA with mod LE, 5 WBCs, many bacteria. Labs notable for Hgb 10.2, plts 116, , ALT 98, alcohol serum not obtained. Pt received IV CTX x1, 1L IVF, and IV ativan 2mg x1 for concern of alcohol withdrawal. Admitted to med/surg for further management.     Admit dx is ETOH use without withdrawal  RD bedscale wt is 57 kg 125#  Pt is confused, answers all questions with her name  NFPE reveals mild and moderate muscle wasting, fat wasting  Pt on Regular diet with Ensure plus bid  suggest maintain this diet  suggest Confirm Goals of Care regarding nutrition support. Will provide nutrition/ hydration within goals of care.  Recommendations to follow in Plan/Intervention

## 2025-03-26 NOTE — PROGRESS NOTE ADULT - TIME BILLING
extensive review of patient's medical chart including prior hospital encounters, current admission progress notes, labs, imaging and other testing, seeing and evaluating patient at bedside, explaining to patient regarding current condition and plan of care, then ordering tests, coordinating care, and finally, documenting today's findings and plan.

## 2025-03-26 NOTE — PROGRESS NOTE ADULT - ASSESSMENT
81 yo woman with HTN, HLD, alcohol use disorder, GERD, peptic ulcer disease, spinal compression deformities, presented for further evaluation after a mechanical fall. Patient fell from her recliner resulting in R hip pain and trouble ambulating. CT head negative. Imaging of chest, pelvis, hips, femur negative. Ua abnormal with pyuria and bacteriuria. Patient was placed on antibiotics. Patient given IV fluids and other supportive care measures. Triaged to Medicine    Mechanical fall  In setting of alcohol use disorder, chronic gait instability, possible UTI, see below. Seen by PT and recommended [facility]    Abnormal UA  UA with mod LE, 5 WBCs, many bacteria. Ur cx > 3 organism morphotypes. Completed empiric course of Rocephin.     Alcohol use disorder with acute withdrawal   3 glasses of wine daily, possibly contributed to fall, last drink was evening of 3/17. Completed standing benzo taper. CIWA protocol.Cont thiamine/folic acid/MVI.    Sinus tachycardia  Resolved. Likely due to deconditioning, alcohol use disorder with withdrawal, less likely from UTI. HR now WNL.     HTN  BP WNL. Continue current management    HLD  Continue statin    Anemia  Likely multifactorial due to chronic disease, alcohol use disorder, malnutrition. Patient denies melena, denies hematochezia. Iron 53. TIBC 163. Iron sat 33% ferritin 941. B12 level WNL.     Thrombocytopenia  Likely secondary to alcohol use. Monitor and trend. No splenomegaly on US    Mild transaminitis  US abdomen w/ hepatic steatosis     Hyperammonemia   No clinical evidence of HE but will trial lactulose     Hypokalemia, hypomagnesemia  Replete K to goal ~4, Mg to ~2    Malnutrition with acute metabolic encephalopathy  On IV thiamine 500mg q8h, day 3 today. 79 yo woman with HTN, HLD, alcohol use disorder, GERD, peptic ulcer disease, spinal compression deformities, presented for further evaluation after a mechanical fall. Patient fell from her recliner resulting in R hip pain and trouble ambulating. CT head negative. Imaging of chest, pelvis, hips, femur negative. Ua abnormal with pyuria and bacteriuria. Patient was placed on antibiotics. Patient given IV fluids and other supportive care measures. Triaged to Medicine    Mechanical fall  In setting of alcohol use disorder, chronic gait instability, possible UTI, see below. Seen by PT and recommended [facility]    Abnormal UA, unable to rule out UTI, present upon admission  UA with mod LE, 5 WBCs, many bacteria. Ur cx > 3 organism morphotypes. Completed empiric course of Rocephin.     Alcohol use disorder with acute withdrawal  3 glasses of wine daily, possibly contributed to fall, last drink was evening of 3/17. Completed standing benzo taper. CIWA protocol. Cont thiamine/folic acid/MVI.    Sinus tachycardia  Resolved. Likely due to deconditioning, alcohol use disorder with withdrawal, less likely from UTI. HR now WNL.     HTN  BP WNL. Continue current management    HLD  Continue statin    Anemia  Likely multifactorial due to chronic disease, alcohol use disorder, malnutrition. Patient denies melena, denies hematochezia. Iron 53. TIBC 163. Iron sat 33% ferritin 941. B12 level WNL.     Thrombocytopenia  Likely secondary to alcohol use. Monitor and trend. No splenomegaly on US    Mild transaminitis  US abdomen w/ hepatic steatosis     Hyperammonemia   No clinical evidence of HE but will trial lactulose.     Hypokalemia, hypomagnesemia  Replete K to goal ~4, Mg to ~2    Malnutrition with acute metabolic encephalopathy  On IV thiamine 500mg q8h, day 3 today. Plan to switch to 200mg IV tomorrow.       Dispo: Anticipate DC to [facility], possibly tomorrow if she continues to improve.  81 yo woman with HTN, HLD, alcohol use disorder, GERD, peptic ulcer disease, spinal compression deformities, presented for further evaluation after a mechanical fall. Patient fell from her recliner resulting in R hip pain and trouble ambulating. CT head negative. Imaging of chest, pelvis, hips, femur negative. Ua abnormal with pyuria and bacteriuria. Patient was placed on antibiotics. Patient given IV fluids and other supportive care measures. Triaged to Medicine    Mechanical fall  In setting of alcohol use disorder, chronic gait instability, possible UTI, see below. Seen by PT and recommended [facility]    Abnormal UA, unable to rule out UTI, present upon admission  UA with mod LE, 5 WBCs, many bacteria. Ur cx > 3 organism morphotypes. Completed empiric course of Rocephin.     Alcohol use disorder with acute withdrawal  3 glasses of wine daily, possibly contributed to fall, last drink was evening of 3/17. Completed standing benzo taper. CIWA protocol. Cont thiamine/folic acid/MVI.    Sinus tachycardia  Resolved. Likely due to deconditioning, alcohol use disorder with withdrawal, less likely from UTI. HR now WNL.     HTN  BP WNL. Continue current management    HLD  Continue statin    Anemia  Likely multifactorial due to chronic disease, alcohol use disorder, malnutrition. Patient denies melena, denies hematochezia. Iron 53. TIBC 163. Iron sat 33% ferritin 941. B12 level WNL.     Thrombocytopenia  Likely secondary to alcohol use. Monitor and trend. No splenomegaly on US    Mild transaminitis  US abdomen w/ hepatic steatosis     Hyperammonemia   No clinical evidence of HE but will trial lactulose.     Hypokalemia, hypomagnesemia  Replete K to goal ~4, Mg to ~2    Moderate protein calorie malnutrition  Regular diet supplemented with Ensure Plus BID. Thiamine, folate, MVI.     Acute metabolic encephalopathy  Possibly multifactorial due to fall, UTI, alcohol use disorder, alcohol withdrawal, electrolyte derangement, hyperammonemia, unable to rule out hepatic encephalopathy. On IV thiamine 500mg q8h, day 3 today. Plan to switch to 200mg IV tomorrow. On empiric lactulose. Completed abx for UTI. Alcohol withdrawal resolved with Ativan taper and supportive care measures. Electrolytes have been repleted and normalized. Patient is overall improved. Awake and alert. Less confused.        Dispo: Anticipate DC to [facility], possibly tomorrow if she continues to improve.  81 yo woman with HTN, HLD, alcohol use disorder, GERD, peptic ulcer disease, spinal compression deformities, presented for further evaluation after a mechanical fall. Patient fell from her recliner resulting in R hip pain and trouble ambulating. CT head negative. Imaging of chest, pelvis, hips, femur negative. Ua abnormal with pyuria and bacteriuria. Patient was placed on antibiotics. Patient given IV fluids and other supportive care measures. Triaged to Medicine    Mechanical fall  In setting of alcohol use disorder, chronic gait instability, possible UTI, see below. Seen by PT and recommended [facility]    Abnormal UA, unable to rule out UTI, present upon admission  UA with mod LE, 5 WBCs, many bacteria. Urine culture 3/18 w/ pan-S GNRs unspecified. Blood culture x 2 3/18 negative. Completed empiric course of Rocephin. Urine culture 3/22 negative (collected after IV antibiotics).    Alcohol use disorder with acute withdrawal  3 glasses of wine daily, possibly contributed to fall, last drink was evening of 3/17. Completed standing benzo taper. CIWA protocol. Cont thiamine/folic acid/MVI.    Sinus tachycardia  Resolved. Likely due to deconditioning, alcohol use disorder with withdrawal, less likely from UTI. HR now WNL.     HTN  BP WNL. Continue current management    HLD  Continue statin    Anemia  Likely multifactorial due to chronic disease, alcohol use disorder, malnutrition. Patient denies melena, denies hematochezia. Iron 53. TIBC 163. Iron sat 33% ferritin 941. B12 level WNL.     Thrombocytopenia  Likely secondary to alcohol use. Monitor and trend. No splenomegaly on US    Mild transaminitis  US abdomen w/ hepatic steatosis     Hyperammonemia   No clinical evidence of HE but will trial lactulose.     Hypokalemia, hypomagnesemia  Replete K to goal ~4, Mg to ~2    Moderate protein calorie malnutrition  Regular diet supplemented with Ensure Plus BID. Thiamine, folate, MVI.     Acute metabolic encephalopathy  Possibly multifactorial due to fall, UTI, alcohol use disorder, alcohol withdrawal, electrolyte derangement, hyperammonemia, unable to rule out hepatic encephalopathy. On IV thiamine 500mg q8h, day 3 today. Plan to switch to 200mg IV tomorrow. On empiric lactulose. Completed abx for UTI. Alcohol withdrawal resolved with Ativan taper and supportive care measures. Electrolytes have been repleted and normalized. Patient is overall improved. Awake and alert. Less confused.        Dispo: Anticipate DC to [facility], possibly tomorrow if she continues to improve.

## 2025-03-27 ENCOUNTER — TRANSCRIPTION ENCOUNTER (OUTPATIENT)
Age: 80
End: 2025-03-27

## 2025-03-27 LAB
ADD ON TEST-SPECIMEN IN LAB: SIGNIFICANT CHANGE UP
ADD ON TEST-SPECIMEN IN LAB: SIGNIFICANT CHANGE UP
AMMONIA BLD-MCNC: 39 UMOL/L — HIGH (ref 11–32)
ANION GAP SERPL CALC-SCNC: 5 MMOL/L — SIGNIFICANT CHANGE UP (ref 5–17)
BASOPHILS # BLD AUTO: 0.03 K/UL — SIGNIFICANT CHANGE UP (ref 0–0.2)
BASOPHILS # BLD MANUAL: 0 K/UL — SIGNIFICANT CHANGE UP (ref 0–0.2)
BASOPHILS NFR BLD AUTO: 0.7 % — SIGNIFICANT CHANGE UP (ref 0–2)
BASOPHILS NFR BLD MANUAL: 0 % — SIGNIFICANT CHANGE UP (ref 0–2)
BUN SERPL-MCNC: 14 MG/DL — SIGNIFICANT CHANGE UP (ref 7–23)
CALCIUM SERPL-MCNC: 8.7 MG/DL — SIGNIFICANT CHANGE UP (ref 8.5–10.1)
CHLORIDE SERPL-SCNC: 112 MMOL/L — HIGH (ref 96–108)
CO2 SERPL-SCNC: 21 MMOL/L — LOW (ref 22–31)
CREAT SERPL-MCNC: 0.93 MG/DL — SIGNIFICANT CHANGE UP (ref 0.5–1.3)
EGFR: 63 ML/MIN/1.73M2 — SIGNIFICANT CHANGE UP
EGFR: 63 ML/MIN/1.73M2 — SIGNIFICANT CHANGE UP
EOSINOPHIL # BLD AUTO: 0.01 K/UL — SIGNIFICANT CHANGE UP (ref 0–0.5)
EOSINOPHIL # BLD MANUAL: 0 K/UL — SIGNIFICANT CHANGE UP (ref 0–0.5)
EOSINOPHIL NFR BLD AUTO: 0.2 % — SIGNIFICANT CHANGE UP (ref 0–6)
EOSINOPHIL NFR BLD MANUAL: 0 % — SIGNIFICANT CHANGE UP (ref 0–6)
GLUCOSE SERPL-MCNC: 118 MG/DL — HIGH (ref 70–99)
HCT VFR BLD CALC: 30.1 % — LOW (ref 34.5–45)
HGB BLD-MCNC: 10.1 G/DL — LOW (ref 11.5–15.5)
IMM GRANULOCYTES # BLD AUTO: 0.02 K/UL — SIGNIFICANT CHANGE UP (ref 0–0.07)
IMM GRANULOCYTES NFR BLD AUTO: 0.5 % — SIGNIFICANT CHANGE UP (ref 0–0.9)
IMMATURE PLATELET FRACTION #: 11.6 K/UL — HIGH (ref 4.7–11.1)
IMMATURE PLATELET FRACTION %: 12.5 % — HIGH (ref 1.6–4.9)
LYMPHOCYTES # BLD AUTO: 0.87 K/UL — LOW (ref 1–3.3)
LYMPHOCYTES # BLD MANUAL: 0.83 K/UL — LOW (ref 1–3.3)
LYMPHOCYTES NFR BLD AUTO: 20.9 % — SIGNIFICANT CHANGE UP (ref 13–44)
LYMPHOCYTES NFR BLD MANUAL: 20 % — SIGNIFICANT CHANGE UP (ref 13–44)
MAGNESIUM SERPL-MCNC: 2.1 MG/DL — SIGNIFICANT CHANGE UP (ref 1.6–2.6)
MANUAL SMEAR VERIFICATION: SIGNIFICANT CHANGE UP
MCHC RBC-ENTMCNC: 33.6 G/DL — SIGNIFICANT CHANGE UP (ref 32–36)
MCHC RBC-ENTMCNC: 34.4 PG — HIGH (ref 27–34)
MCV RBC AUTO: 102.4 FL — HIGH (ref 80–100)
MONOCYTES # BLD AUTO: 1.06 K/UL — HIGH (ref 0–0.9)
MONOCYTES # BLD MANUAL: 0.92 K/UL — HIGH (ref 0–0.9)
MONOCYTES NFR BLD AUTO: 25.5 % — HIGH (ref 2–14)
MONOCYTES NFR BLD MANUAL: 22 % — HIGH (ref 2–14)
NEUTROPHILS # BLD AUTO: 2.17 K/UL — SIGNIFICANT CHANGE UP (ref 1.8–7.4)
NEUTROPHILS # BLD MANUAL: 2.41 K/UL — SIGNIFICANT CHANGE UP (ref 1.8–7.4)
NEUTROPHILS NFR BLD AUTO: 52.2 % — SIGNIFICANT CHANGE UP (ref 43–77)
NEUTROPHILS NFR BLD MANUAL: 58 % — SIGNIFICANT CHANGE UP (ref 43–77)
NRBC # BLD AUTO: 0 K/UL — SIGNIFICANT CHANGE UP (ref 0–0)
NRBC # FLD: 0 K/UL — SIGNIFICANT CHANGE UP (ref 0–0)
NRBC BLD AUTO-RTO: 0 /100 WBCS — SIGNIFICANT CHANGE UP (ref 0–0)
PHOSPHATE SERPL-MCNC: 2.6 MG/DL — SIGNIFICANT CHANGE UP (ref 2.5–4.5)
PLAT MORPH BLD: NORMAL — SIGNIFICANT CHANGE UP
PLATELET # BLD AUTO: 93 K/UL — LOW (ref 150–400)
PMV BLD: 13.6 FL — HIGH (ref 7–13)
POTASSIUM SERPL-MCNC: 3.8 MMOL/L — SIGNIFICANT CHANGE UP (ref 3.5–5.3)
POTASSIUM SERPL-SCNC: 3.8 MMOL/L — SIGNIFICANT CHANGE UP (ref 3.5–5.3)
RBC # BLD: 2.94 M/UL — LOW (ref 3.8–5.2)
RBC # FLD: 13.9 % — SIGNIFICANT CHANGE UP (ref 10.3–14.5)
RBC BLD AUTO: NORMAL — SIGNIFICANT CHANGE UP
SODIUM SERPL-SCNC: 138 MMOL/L — SIGNIFICANT CHANGE UP (ref 135–145)
T4 FREE SERPL-MCNC: 1.15 NG/DL — SIGNIFICANT CHANGE UP (ref 0.93–1.7)
TSH SERPL-MCNC: 1.71 UU/ML — SIGNIFICANT CHANGE UP (ref 0.34–4.82)
WBC # BLD: 4.16 K/UL — SIGNIFICANT CHANGE UP (ref 3.8–10.5)
WBC # FLD AUTO: 4.16 K/UL — SIGNIFICANT CHANGE UP (ref 3.8–10.5)
WBC MORPHOLOGY: NORMAL — SIGNIFICANT CHANGE UP

## 2025-03-27 PROCEDURE — 99233 SBSQ HOSP IP/OBS HIGH 50: CPT

## 2025-03-27 RX ADMIN — Medication 650 MILLIGRAM(S): at 13:10

## 2025-03-27 RX ADMIN — EZETIMIBE 10 MILLIGRAM(S): 10 TABLET ORAL at 12:36

## 2025-03-27 RX ADMIN — Medication 40 MILLIGRAM(S): at 20:33

## 2025-03-27 RX ADMIN — Medication 650 MILLIGRAM(S): at 12:40

## 2025-03-27 RX ADMIN — MIRTAZAPINE 7.5 MILLIGRAM(S): 30 TABLET, FILM COATED ORAL at 20:34

## 2025-03-27 RX ADMIN — Medication 20 MILLIGRAM(S): at 20:33

## 2025-03-27 RX ADMIN — LACTULOSE 20 GRAM(S): 10 SOLUTION ORAL at 20:33

## 2025-03-27 RX ADMIN — Medication 105 MILLIGRAM(S): at 12:41

## 2025-03-27 RX ADMIN — Medication 4 MILLIGRAM(S): at 08:15

## 2025-03-27 RX ADMIN — METOPROLOL SUCCINATE 50 MILLIGRAM(S): 50 TABLET, EXTENDED RELEASE ORAL at 20:33

## 2025-03-27 RX ADMIN — ENOXAPARIN SODIUM 40 MILLIGRAM(S): 100 INJECTION SUBCUTANEOUS at 12:37

## 2025-03-27 RX ADMIN — FOLIC ACID 1 MILLIGRAM(S): 1 TABLET ORAL at 12:37

## 2025-03-27 RX ADMIN — LOSARTAN POTASSIUM 100 MILLIGRAM(S): 100 TABLET, FILM COATED ORAL at 05:17

## 2025-03-27 RX ADMIN — Medication 105 MILLIGRAM(S): at 05:17

## 2025-03-27 RX ADMIN — Medication 105 MILLIGRAM(S): at 20:33

## 2025-03-27 RX ADMIN — LACTULOSE 20 GRAM(S): 10 SOLUTION ORAL at 05:17

## 2025-03-27 RX ADMIN — ROSUVASTATIN CALCIUM 10 MILLIGRAM(S): 5 TABLET, FILM COATED ORAL at 20:33

## 2025-03-27 RX ADMIN — LACTULOSE 20 GRAM(S): 10 SOLUTION ORAL at 12:41

## 2025-03-27 RX ADMIN — Medication 40 MILLIGRAM(S): at 08:16

## 2025-03-27 RX ADMIN — METOPROLOL SUCCINATE 100 MILLIGRAM(S): 50 TABLET, EXTENDED RELEASE ORAL at 10:01

## 2025-03-27 RX ADMIN — Medication 20 MILLIGRAM(S): at 10:01

## 2025-03-27 RX ADMIN — Medication 1 TABLET(S): at 12:38

## 2025-03-27 NOTE — DISCHARGE NOTE PROVIDER - NSDCCPCAREPLAN_GEN_ALL_CORE_FT
PRINCIPAL DISCHARGE DIAGNOSIS  Diagnosis: Fall  Assessment and Plan of Treatment: You were admitted to the hospital following a mechanical fall, which occurred in setting of alcohol use disorder with alcohol withdrawal, chronic gait instability, severe osteoarthritis, probable urinary tract infection. These issues were addressed, see below. You were seen and evaluated by Physical Therapy, recommended subacute rehab. Stable for discharge. Continue restorative PT sessions. Out of bed to chair daily. Continue medications as prescribed. Follow up with appointments as able.         SECONDARY DISCHARGE DIAGNOSES  Diagnosis: Urinary tract infection  Assessment and Plan of Treatment: Upon presentation you were noted to have an abnormal urinalysis possibly indicative of a urinary tract infection with mild pryuria and bacteriuria. Urine culture returned positive for E.coli, pan-susceptible. You completed a course of intravenous antibiotics while here. Of note, when you had a mild decline in kidney function mid admission, urine was collected again and was negative for infection on urinalysis and culture. And the kidney function returned to normal with fluid hydration, see below.    Diagnosis: Alcohol dependence with withdrawal  Assessment and Plan of Treatment: You were also admitted for alcohol use disorder with acute withdrawal. You received an Ativan taper and a alcohol withdrawal medication protocol for as needed doses of Ativan to help you recover. You completed that course. You also received thiamine, folate and multivitamin. Appreciate Social Work input. Cessation encouraged. Treatment referrals offered.    Diagnosis: Sinus tachycardia  Assessment and Plan of Treatment: Resolved. Likely due to deconditioning, alcohol use disorder with withdrawal, less likely from UTI. Heart rate now normal.    Diagnosis: HTN (hypertension)  Assessment and Plan of Treatment: Blood pressure normal. Continue current management    Diagnosis: HLD (hyperlipidemia)  Assessment and Plan of Treatment: Continue statin    Diagnosis: RACHEL (acute kidney injury)  Assessment and Plan of Treatment: You had acute albeit mild decline in kidney function during the admission, likely due to dehydration from decreased oral intake and stooling while on lactulose. There was no sign of obstructon as you were vioding and had acceptable bladder scan measurements. Since was able to stop lactulose as ammonia level normalized. Provided IV fluids and kidney function has returned to baseline.    Diagnosis: Anemia  Assessment and Plan of Treatment: Stable. Likely multi factorial due to chronic disease, alcohol use disorder, malnutrition. You ve had no blood in stool. No black stool. Iron studies were not consistent with iron deficienc. B12 level was normal. Continue workup as outpatient.       Diagnosis: Thrombocytopenia  Assessment and Plan of Treatment: Stable. Likely secondary to alcohol use. No splenomegaly on ulrasoud. Monitored and trended. Latest platelet count 92.    Diagnosis: Abnormal transaminases  Assessment and Plan of Treatment: Mild transaminitis noted upon admission. Ultrasound of abdomen w/ hepatic steatosis in setting of alcohol use disorder. Continue to monitor as outpatient. May benefit from an outpatient fibroscan. Can follow up with Primary Care to arrange.    Diagnosis: Hyperammonemia  Assessment and Plan of Treatment: No clinical evidence of hepatic encephalopathy but you were treated with a trial of lactulose and ammonia level normalized. You nonetheless remain confused and continued lactulose made you prone do dehydration so it was stopped. Continue to monitor.    Diagnosis: Electrolyte disturbance  Assessment and Plan of Treatment: Resolved with electrolyte supplementation    Diagnosis: Malnutrition  Assessment and Plan of Treatment: Appreciate dietician input. Provide regular diet supplemented with Ensure Plus High Protein twice a day, thiamine, folate, and multivitamin       Diagnosis: Acute metabolic encephalopathy  Assessment and Plan of Treatment: Multi factorial due to fall, UTI, alcohol use disorder, alcohol withdrawal, electrolyte derangement, hyperammonemia, unable to rule out hepatic encephalopathy. Completed antibiotic treatment for UTI. Alcohol withdrawal resolved with Ativan taper and supportive care measures. Electrolytes have been repleted and normalized. Gave empiric lactulose for the hyperammonemia, now normal, held lactulose. Thyroid function blood tests normal. B12 level normal. You were treated with empiric high dose thiamine IV, can now switch to 100mg daily. Still confused, suspect underlying neurocognitive impairment from alcoholism, cerebral microvascular disease. CT head with moderate white matter small vessel ischemic disease, no other significant findings. Continue to reorient frequently. Recommend outpatient neurocognitive testing. Referred to Neurology Dr. Quiros. If confusion persists would try holding Remeron and seeing if that helps.    Diagnosis: Osteoarthritis  Assessment and Plan of Treatment: Severe osteoarthritis of R hip. Not a surgical candidate. No acute injury sustained from fall. Pain control as needed. Supportive care measures. Continue restorative physical therapy sessions.

## 2025-03-27 NOTE — PROGRESS NOTE ADULT - SUBJECTIVE AND OBJECTIVE BOX
Chief Complaint: Fall with resultant R hip pain and difficulty ambulating    Interval Hx: Patient seen and examined. OOB in armchair. Resting comfortably. Awake and alert. Improved as compared to admission but still quite confused, encephalopathic. No acute complaints aside for generalized weakness. Denies headache, dizziness, vertigo, focal weakness or numbness. No chest complaints or abdominal complaints. No dysuria. No fever or chills. No rash or synovitis. On empiric IV thiamine, empiric lactulose. Planned for [facility] when medically cleared.     ROS: Multi system review is comprehensively negative x 10 systems except as above    Vitals:  Afebrile  -140/60-80  HR 70s-80s  RR 16-18  O2 % on RA    Exam:  Gen: No acute distress  HEENT: NCAT PERRL EOMI MMM clear oropharynx  Neck: Supple, no JVD no LAD  CVS:s1 s2 normal RRR  Chest: Normal resp effort, lungs CTA B/L  Abd: +BS, soft, non distended, non tender  Ext: No edema, no tenderness, intact peripheral pulses  Skin: Warm, dry  Mood: Calm, pleasant  Neuro: Awake and alert, answers simple questions appropriately, follows commands    Labs:                               10.1   4.16 )-----------( 93             30.1          RDW 14    Iron 53    TIBC 163   Iron sat 33%  ferritin 941   B12 level WNL      138  |  112  |  14  ------------------------<  118  3.8   |   21   |  0.93    Ca    8.7   Mg    2.1        TPro  5.8  /  Alb  2.7  /  TBili  0.7  /  DBili  x   /  AST  39  /  ALT  43  /  AlkPhos  72      UA with mod LE, 5 WBCs, many bacteria.    Micro:  Urine culture 3/22: Negative (collected after IV antibiotics)  Blood culture x 2 3/18: Negative  Urine culture 3/18: pan-S GNRs unspecified    Imaging:  US abdomen 3/20: Hepatic steatosis.    CT bony pelvis 3/18: No acute fracture or dislocation.    CT head 3/18: No significant interval change. No acute intracranial  hemorrhage, midline shift or calvarial fracture. Moderate white matter small vessel ischemic disease.    XR pelvis and right hip 3/18: No acute fracture or dislocation. Some sclerotic changes related to the greater trochanteric region bilaterally.     XR R femur 3/18: No acute fracture or dislocation. Some sclerotic changes related to the greater trochanteric region.     XR chest 3/18: No focal infiltrate or congestion. Some elevation of the left hemidiaphragm noted. Regional osseous structures appropriate for age.     Cardiac Testing:  EKG 3/18: sinus tach    Meds:  MEDICATIONS  (STANDING):  enoxaparin Injectable 40 milliGRAM(s) SubCutaneous every 24 hours  ezetimibe 10 milliGRAM(s) Oral daily  famotidine    Tablet 20 milliGRAM(s) Oral two times a day  folic acid 1 milliGRAM(s) Oral daily  lactulose Syrup 20 Gram(s) Oral three times a day  losartan 100 milliGRAM(s) Oral daily  metoprolol succinate  milliGRAM(s) Oral daily  metoprolol succinate ER 50 milliGRAM(s) Oral at bedtime  mirtazapine 7.5 milliGRAM(s) Oral at bedtime  multivitamin/minerals 1 Tablet(s) Oral daily  pantoprazole    Tablet 40 milliGRAM(s) Oral two times a day  rosuvastatin 10 milliGRAM(s) Oral at bedtime  thiamine IVPB 500 milliGRAM(s) IV Intermittent three times a day    MEDICATIONS  (PRN):  acetaminophen     Tablet .. 650 milliGRAM(s) Oral every 6 hours PRN Temp greater or equal to 38C (100.4F), Mild Pain (1 - 3)  aluminum hydroxide/magnesium hydroxide/simethicone Suspension 30 milliLiter(s) Oral every 4 hours PRN Dyspepsia  melatonin 3 milliGRAM(s) Oral at bedtime PRN Insomnia  ondansetron Injectable 4 milliGRAM(s) IV Push every 8 hours PRN Nausea and/or Vomiting

## 2025-03-27 NOTE — DISCHARGE NOTE PROVIDER - NSDCFUADDAPPT_GEN_ALL_CORE_FT
APPTS ARE READY TO BE MADE: [X] YES    Primary Care / Cardiology Dr Chan Kevin within 2 weeks  Contact: Son, Maxwell Sanders, [phone] APPTS ARE READY TO BE MADE: [X] YES    Primary Care / Cardiology Dr Chan Kevin within 2 weeks  Neurology Dr. Li Quiros, within 2-4 weeks    Contact: Son, Maxwell Sanders, [phone] APPTS ARE READY TO BE MADE: [X] YES    Primary Care / Cardiology Dr Chan Kevin within 2 weeks  Neurology Dr. Li Quiros, within 2-4 weeks    Contact: Son, Maxwell Sanders, [phone]    Patient is being transferred. Caregiver will arrange follow up.

## 2025-03-27 NOTE — DISCHARGE NOTE PROVIDER - PROVIDER TOKENS
PROVIDER:[TOKEN:[[id number]],FOLLOWUP:[2 weeks]] PROVIDER:[TOKEN:[[id number]],FOLLOWUP:[2 weeks]],PROVIDER:[TOKEN:[[id number]],FOLLOWUP:[1 month]]

## 2025-03-27 NOTE — DISCHARGE NOTE PROVIDER - REASON FOR ADMISSION
Mechanical fall  Alcohol use disorder with withdrawal  Acute metabolic encephalopathy   Mechanical fall  Alcohol use disorder with withdrawal  Acute metabolic encephalopathy s/p mechanical fall, alcohol use disorder

## 2025-03-27 NOTE — DISCHARGE NOTE PROVIDER - CARE PROVIDERS DIRECT ADDRESSES
,[email or web address]

## 2025-03-27 NOTE — PROGRESS NOTE ADULT - ASSESSMENT
79 yo woman with HTN, HLD, alcohol use disorder, GERD, peptic ulcer disease, spinal compression deformities, presented for further evaluation after a mechanical fall. Patient fell from her recliner resulting in R hip pain and trouble ambulating. CT head negative. Imaging of chest, pelvis, hips, femur negative. Ua abnormal with pyuria and bacteriuria. Patient was placed on antibiotics. Patient given IV fluids and other supportive care measures. Triaged to Medicine    Mechanical fall  In setting of alcohol use disorder, chronic gait instability, possible UTI, see below. Seen by PT and recommended [facility].     Abnormal UA, unable to rule out UTI, present upon admission  Resolved. UA with mod LE, 5 WBCs, many bacteria. Urine culture 3/18 w/ pan-S GNRs unspecified. Blood culture x 2 3/18 negative. Sepsis ruled out. Completed empiric course of Rocephin. Urine culture 3/22 negative (collected after IV antibiotics).    Alcohol use disorder with acute withdrawal  Resolved. Patient consumes approx 3 glasses of wine daily, possibly contributed to fall, last drink was evening of 3/17. Completed standing benzo taper. CIWA protocol. No longer in withdrawal. Cont thiamine/folic acid/MVI. Appreciate Social Work input. Cessation encouraged. Treatment referrals offered.    Sinus tachycardia  Resolved. Likely due to deconditioning, alcohol use disorder with withdrawal, less likely from UTI. HR now WNL.     HTN  BP WNL. Continue current management    HLD  Continue statin    Anemia  Stable. Likely multifactorial due to chronic disease, alcohol use disorder, malnutrition. Patient denies melena, denies hematochezia. Iron 53. TIBC 163. Iron sat 33% ferritin 941. B12 level WNL.     Thrombocytopenia  Stable. Likely secondary to alcohol use. No splenomegaly on US. Monitored and trended.     Mild transaminitis  US abdomen w/ hepatic steatosis in setting of alcohol use disorder. Continue to monitor as outpatient. May benefit from an outpatient fibroscan. Can follow up with PCP to arrange.     Hyperammonemia   No clinical evidence of HE but will continue trial lactulose. Ammonia level is down-trending    Hypokalemia, hypomagnesemia  Repleted K to goal ~4, Mg to ~2    Moderate protein calorie malnutrition  Regular diet supplemented with Ensure Plus BID. Thiamine, folate, MVI.     Acute metabolic encephalopathy  Possibly multifactorial due to fall, UTI, alcohol use disorder, alcohol withdrawal, electrolyte derangement, hyperammonemia, unable to rule out hepatic encephalopathy. On IV thiamine 500mg q8h, day 4 today. Plan to switch to 200mg IV tomorrow. On empiric lactulose. Completed abx for UTI. Alcohol withdrawal resolved with Ativan taper and supportive care measures. Electrolytes have been repleted and normalized. B12 WNL. Patient is overall improved. Awake and alert. Still confused. Continuing empiric IV thiamine and PO lactulose. Check TSH, FT4.       Dispo: Anticipate DC to [facility], possibly tomorrow if she continues to improve.  81 yo woman with HTN, HLD, alcohol use disorder, GERD, peptic ulcer disease, spinal compression deformities, presented for further evaluation after a mechanical fall. Patient fell from her recliner resulting in R hip pain and trouble ambulating. CT head negative. Imaging of chest, pelvis, hips, femur negative. Ua abnormal with pyuria and bacteriuria. Patient was placed on antibiotics. Patient given IV fluids and other supportive care measures. Triaged to Medicine    Mechanical fall  In setting of alcohol use disorder, chronic gait instability, possible UTI, see below. Seen by PT and recommended [facility].     Abnormal UA, unable to rule out UTI, present upon admission  Resolved. UA with mod LE, 5 WBCs, many bacteria. Urine culture 3/18 w/ pan-S GNRs unspecified. Blood culture x 2 3/18 negative. Sepsis ruled out. Completed empiric course of Rocephin. Urine culture 3/22 negative (collected after IV antibiotics).    Alcohol use disorder with acute withdrawal  Resolved. Patient consumes approx 3 glasses of wine daily, possibly contributed to fall, last drink was evening of 3/17. Completed standing benzo taper. CIWA protocol. No longer in withdrawal. Cont thiamine/folic acid/MVI. Appreciate Social Work input. Cessation encouraged. Treatment referrals offered.    Sinus tachycardia  Resolved. Likely due to deconditioning, alcohol use disorder with withdrawal, less likely from UTI. HR now WNL.     HTN  BP WNL. Continue current management    HLD  Continue statin    Anemia  Stable. Likely multifactorial due to chronic disease, alcohol use disorder, malnutrition. Patient denies melena, denies hematochezia. Iron 53. TIBC 163. Iron sat 33% ferritin 941. B12 level WNL.     Thrombocytopenia  Stable. Likely secondary to alcohol use. No splenomegaly on US. Monitored and trended.     Mild transaminitis  US abdomen w/ hepatic steatosis in setting of alcohol use disorder. Continue to monitor as outpatient. May benefit from an outpatient fibroscan. Can follow up with PCP to arrange.     Hyperammonemia   No clinical evidence of HE but will continue trial lactulose. Ammonia level is down-trending    Hypokalemia, hypomagnesemia  Repleted K to goal ~4, Mg to ~2    Moderate protein calorie malnutrition  Regular diet supplemented with Ensure Plus BID. Thiamine, folate, MVI.     Acute metabolic encephalopathy  Possibly multifactorial due to fall, UTI, alcohol use disorder, alcohol withdrawal, electrolyte derangement, hyperammonemia, unable to rule out hepatic encephalopathy. On IV thiamine 500mg q8h, day 4 today. Plan to switch to 200mg IV tomorrow. On empiric lactulose. Completed abx for UTI. Alcohol withdrawal resolved with Ativan taper and supportive care measures. Electrolytes have been repleted and normalized. B12 WNL. Patient is overall improved. Awake and alert. Still confused. Continuing empiric IV thiamine and PO lactulose. Check TSH, FT4.     Clinically suspected cognitive impairment  In setting of chronic alcohol use. B12 normal. Requested thyroid function studies. Pending. CT head with moderate white matter small vessel ischemic disease. Will refer to Neurology as outpatient for formal neurocognitive testing.       Severe osteoarthritis of R hip  Not a surgical candidate. No acute injury sustained from fall. Pain control as needed. Supportive care measures. Continue restorative PT sessions.         Dispo: Anticipate DC to [facility], possibly tomorrow if she continues to improve.

## 2025-03-27 NOTE — DISCHARGE NOTE PROVIDER - NSDCPNSUBOBJ_GEN_ALL_CORE
Chief Complaint: Fall with resultant R hip pain and difficulty ambulating    Interval Hx: Patient seen and examined. OOB in armchair. Resting comfortably. Awake and alert. Improved as compared to admission. No acute complaints aside for generalized weakness. Denies headache, dizziness, vertigo, focal weakness or numbness. No chest complaints or abdominal complaints. No dysuria. No fever or chills. No rash or synovitis. Stable for discharge to [facility].     ROS: Multi system review is comprehensively negative x 10 systems except as above    Vitals:  Afebrile  -140/60-80  HR 70s-80s  RR 16-18  O2 % on RA    Exam:  Gen: No acute distress  HEENT: NCAT PERRL EOMI MMM clear oropharynx  Neck: Supple, no JVD no LAD  CVS:s1 s2 normal RRR  Chest: Normal resp effort, lungs CTA B/L  Abd: +BS, soft, non distended, non tender  Ext: No edema, no tenderness, intact peripheral pulses  Skin: Warm, dry  Mood: Calm, pleasant  Neuro: Awake and alert, answers simple questions appropriately, follows commands    Labs:                               10.1   4.16 )-----------( 93             30.1          RDW 14    Iron 53    TIBC 163   Iron sat 33%  ferritin 941   B12 level WNL      138  |  112[H]  |  14  ----------------------------<  118[H]  3.8   |  21[L]  |  0.93    Ca    8.7   Mg    2.1        TPro  5.8  /  Alb  2.7  /  TBili  0.7  /  DBili  x   /  AST  39  /  ALT  43  /  AlkPhos  72      UA with mod LE, 5 WBCs, many bacteria.    Micro:  Urine culture 3/22: Negative (collected after IV antibiotics)  Blood culture x 2 3/18: Negative  Urine culture 3/18: pan-S GNRs unspecified    Imaging:  US abdomen 3/20: Hepatic steatosis.    CT bony pelvis 3/18: No acute fracture or dislocation.    CT head 3/18: No significant interval change. No acute intracranial  hemorrhage, midline shift or calvarial fracture. Moderate white matter small vessel ischemic disease.    XR pelvis and right hip 3/18: No acute fracture or dislocation. Some sclerotic changes related to the greater trochanteric region bilaterally.     XR R femur 3/18: No acute fracture or dislocation. Some sclerotic changes related to the greater trochanteric region.     XR chest 3/18: No focal infiltrate or congestion. Some elevation of the left hemidiaphragm noted. Regional osseous structures appropriate for age.     Cardiac Testing:  EKG 3/18: sinus tach   Chief Complaint: Fall with resultant R hip pain and difficulty ambulating    Interval Hx: Patient seen and examined. Resting comfortably. Awake and alert. Improved as compared to admission but still confused. No acute complaints aside for generalized weakness for which shes planned for [facility]. She denies headache, dizziness, vertigo, focal weakness or numbness. No chest complaints or abdominal complaints. No dysuria. No fever or chills. No rash or synovitis. On empiric IV thiamine. S/P empiric lactulose with normalization of previously elevated ammonia level. Cr improved with her having received IV fluid hydration. Normal bladder scans. UA negative for infection. Stable for DC to [facility].      ROS: Multi system review is comprehensively negative x 10 systems except as above    Vitals:  T(F): 98.1 (30 Mar 2025 08:35), Max: 99.1 (29 Mar 2025 16:01)  HR: 79 (30 Mar 2025 08:35) (77 - 88)  BP: 159/64 (30 Mar 2025 08:35) (113/55 - 159/64)  RR: 18 (30 Mar 2025 08:35) (17 - 18)  SpO2: 99% (30 Mar 2025 08:35) (99% - 100%) on room air    Exam:  Gen: No acute distress  HEENT: NCAT PERRL EOMI MMM clear oropharynx  Neck: Supple, no JVD no LAD  CVS:s1 s2 normal RRR  Chest: Normal resp effort, lungs CTA B/L  Abd: +BS, soft, non distended, non tender  Ext: No edema, no tenderness, intact peripheral pulses  Skin: Warm, dry  Mood: Calm, pleasant  Neuro: Awake and alert, answers simple questions appropriately, follows commands    Labs:                               10.1   4.16 )-----------( 93             30.1          RDW 14    Iron 53    TIBC 163   Iron sat 33%  ferritin 941   B12 level WNL      139  |  111   |  17  ----------------------<  92  3.9   |   21   |  1.11    Ca    8.8      Phos  3.4     Mg  2.0         TPro  5.8  /  Alb  2.7  /  TBili  0.7  /  DBili  x   /  AST  39  /  ALT  43  /  AlkPhos  72      TSH and FT4 WNL    B12 WNL      Ammonia 50 --> 30    Ur Na 69  Ur Cr 161   FENa 0.34%    UA 3/29: Cloudy, dark yellow, trace ket, prot 30, N-, mod LE, 4 WBC, 49 RBC, bact none    UA 3/22: with mod LE, 5 WBCs, many bacteria.    Micro:  Urine culture 3/22: Negative (collected after IV antibiotics)  Blood culture x 2 3/18: Negative  Urine culture 3/18: pan-S GNRs unspecified    Imaging:  US abdomen 3/20: Hepatic steatosis.    CT bony pelvis 3/18: No acute fracture or dislocation.    CT head 3/18: No significant interval change. No acute intracranial  hemorrhage, midline shift or calvarial fracture. Moderate white matter small vessel ischemic disease.    XR pelvis and right hip 3/18: No acute fracture or dislocation. Some sclerotic changes related to the greater trochanteric region bilaterally.     XR R femur 3/18: No acute fracture or dislocation. Some sclerotic changes related to the greater trochanteric region.     XR chest 3/18: No focal infiltrate or congestion. Some elevation of the left hemidiaphragm noted. Regional osseous structures appropriate for age.     Cardiac Testing:  EKG 3/18: sinus tach

## 2025-03-27 NOTE — DISCHARGE NOTE PROVIDER - NSDCMRMEDTOKEN_GEN_ALL_CORE_FT
famotidine 40 mg oral tablet: 1 tab(s) orally once a day  folic acid 1 mg oral tablet: 1 tab(s) orally once a day  metoprolol succinate 100 mg oral tablet, extended release: 1 tab(s) orally once a day (in the morning)  metoprolol succinate 50 mg oral tablet, extended release: 1 tab(s) orally once a day (in the evening)  Multiple Vitamins with Minerals oral tablet: 1 tab(s) orally once a day  olmesartan 40 mg oral tablet: 1 tab(s) orally once a day  pantoprazole 40 mg oral delayed release tablet: 1 tab(s) orally 2 times a day   rosuvastatin 10 mg oral tablet: 1 tab(s) orally once a day  thiamine 100 mg oral tablet: 1 tab(s) orally once a day  Vitamin D3 25 mcg (1000 intl units) oral tablet: 4 tab(s) orally once a day  Zetia 10 mg oral tablet: 1 tab(s) orally once a day   acetaminophen 325 mg oral tablet: 2 tab(s) orally every 6 hours As needed Temp greater or equal to 38C (100.4F), Mild Pain (1 - 3)  famotidine 40 mg oral tablet: 1 tab(s) orally once a day  folic acid 1 mg oral tablet: 1 tab(s) orally once a day  metoprolol succinate 100 mg oral tablet, extended release: 1 tab(s) orally once a day (in the morning)  metoprolol succinate 50 mg oral tablet, extended release: 1 tab(s) orally once a day (in the evening)  mirtazapine 7.5 mg oral tablet: 1 tab(s) orally once a day (at bedtime)  Multiple Vitamins with Minerals oral tablet: 1 tab(s) orally once a day  olmesartan 40 mg oral tablet: 1 tab(s) orally once a day  pantoprazole 40 mg oral delayed release tablet: 1 tab(s) orally 2 times a day   rosuvastatin 10 mg oral tablet: 1 tab(s) orally once a day  thiamine 100 mg oral tablet: 1 tab(s) orally once a day  Vitamin D3 25 mcg (1000 intl units) oral tablet: 4 tab(s) orally once a day  Zetia 10 mg oral tablet: 1 tab(s) orally once a day

## 2025-03-27 NOTE — DISCHARGE NOTE PROVIDER - ATTENDING ATTESTATION STATEMENT
No Decelerations
I have personally seen and examined the patient. I have collaborated with and supervised the

## 2025-03-27 NOTE — DISCHARGE NOTE PROVIDER - CARE PROVIDER_API CALL
Chan Kevin  Cardiovascular Disease  [address]  [place] [address]  Phone: [phone]  Fax: [phone]  Follow Up Time: 2 weeks   Chan Kevin  Cardiovascular Disease  [address], [address]  [place] [address]  Phone: [phone]  Fax: [phone]  Follow Up Time: 2 weeks    Li Quiros  Neurology  [address], [address]  [place] [address]  Phone: [phone]  Fax: [phone]  Follow Up Time: 1 month

## 2025-03-27 NOTE — DISCHARGE NOTE PROVIDER - HOSPITAL COURSE
79 yo woman with HTN, HLD, alcohol use disorder, GERD, peptic ulcer disease, spinal compression deformities, presented for further evaluation after a mechanical fall. Patient fell from her recliner resulting in R hip pain and trouble ambulating. CT head negative. Imaging of chest, pelvis, hips, femur negative. Ua abnormal with pyuria and bacteriuria. Patient was placed on antibiotics. Patient given IV fluids and other supportive care measures. Triaged to Medicine    Mechanical fall  In setting of alcohol use disorder, chronic gait instability, possible UTI, see below. Seen by PT and recommended [facility]. Stable for discharge to [facility].     Abnormal UA, unable to rule out UTI, present upon admission  Resolved. UA with mod LE, 5 WBCs, many bacteria. Urine culture 3/18 w/ pan-S GNRs unspecified. Blood culture x 2 3/18 negative. Sepsis ruled out. Completed empiric course of Rocephin. Urine culture 3/22 negative (collected after IV antibiotics).    Alcohol use disorder with acute withdrawal  Resolved. Patient consumes approx 3 glasses of wine daily, possibly contributed to fall, last drink was evening of 3/17. Completed standing benzo taper. CIWA protocol. No longer in withdrawal. Cont thiamine/folic acid/MVI. Appreciate Social Work input. Cessation encouraged. Treatment referrals offered.    Sinus tachycardia  Resolved. Likely due to deconditioning, alcohol use disorder with withdrawal, less likely from UTI. HR now WNL.     HTN  BP WNL. Continue current management    HLD  Continue statin    Anemia  Stable. Likely multifactorial due to chronic disease, alcohol use disorder, malnutrition. Patient denies melena, denies hematochezia. Iron 53. TIBC 163. Iron sat 33% ferritin 941. B12 level WNL.     Thrombocytopenia  Stable. Likely secondary to alcohol use. No splenomegaly on US. Monitored and trended.     Mild transaminitis  US abdomen w/ hepatic steatosis in setting of alcohol use disorder. Continue to monitor as outpatient. May benefit from an outpatient fibroscan. Can follow up with PCP to arrange.     Hyperammonemia   No clinical evidence of HE but will trial lactulose.     Hypokalemia, hypomagnesemia  Repleted K to goal ~4, Mg to ~2    Moderate protein calorie malnutrition  Regular diet supplemented with Ensure Plus BID. Thiamine, folate, MVI.     Acute metabolic encephalopathy  Improved. Possibly multifactorial due to fall, UTI, alcohol use disorder, alcohol withdrawal, electrolyte derangement, hyperammonemia, unable to rule out hepatic encephalopathy. On IV thiamine 500mg q8h, day 3 today. Plan to switch to 200mg IV tomorrow. On empiric lactulose. Completed abx for UTI. Alcohol withdrawal resolved with Ativan taper and supportive care measures. Electrolytes have been repleted and normalized. Patient is overall improved. Awake and alert. Less confused.     81 yo woman with HTN, HLD, alcohol use disorder, GERD, peptic ulcer disease, spinal compression deformities, presented for further evaluation after a mechanical fall. Patient fell from her recliner resulting in R hip pain and trouble ambulating. CT head negative. Imaging of chest, pelvis, hips, femur negative. Ua abnormal with pyuria and bacteriuria. Patient was placed on antibiotics. Patient given IV fluids and other supportive care measures. Triaged to Medicine    Mechanical fall  In setting of alcohol use disorder, chronic gait instability, possible UTI, see below. Seen by PT and recommended [facility]. Stable for discharge to [facility].     Abnormal UA, unable to rule out UTI, present upon admission  Resolved. UA with mod LE, 5 WBCs, many bacteria. Urine culture 3/18 w/ pan-S GNRs unspecified. Blood culture x 2 3/18 negative. Sepsis ruled out. Completed empiric course of Rocephin. Urine culture 3/22 negative (collected after IV antibiotics).    Alcohol use disorder with acute withdrawal  Resolved. Patient consumes approx 3 glasses of wine daily, possibly contributed to fall, last drink was evening of 3/17. Completed standing benzo taper. CIWA protocol. No longer in withdrawal. Cont thiamine/folic acid/MVI. Appreciate Social Work input. Cessation encouraged. Treatment referrals offered.    Sinus tachycardia  Resolved. Likely due to deconditioning, alcohol use disorder with withdrawal, less likely from UTI. HR now WNL.     HTN  BP WNL. Continue current management    HLD  Continue statin    Anemia  Stable. Likely multifactorial due to chronic disease, alcohol use disorder, malnutrition. Patient denies melena, denies hematochezia. Iron 53. TIBC 163. Iron sat 33% ferritin 941. B12 level WNL.     Thrombocytopenia  Stable. Likely secondary to alcohol use. No splenomegaly on US. Monitored and trended.     Mild transaminitis  US abdomen w/ hepatic steatosis in setting of alcohol use disorder. Continue to monitor as outpatient. May benefit from an outpatient fibroscan. Can follow up with PCP to arrange.     Hyperammonemia   No clinical evidence of HE but will trial lactulose.     Hypokalemia, hypomagnesemia  Repleted K to goal ~4, Mg to ~2    Moderate protein calorie malnutrition  Regular diet supplemented with Ensure Plus BID. Thiamine, folate, MVI.     Acute metabolic encephalopathy  Improved. Possibly multifactorial due to fall, 80 year-old woman with HTN, HLD, alcohol use disorder, GERD, peptic ulcer disease, spinal compression deformities, presented for further evaluation after a mechanical fall. Patient fell from her recliner resulting in R hip pain and trouble ambulating. CT head negative. Imaging of chest, pelvis, hips, femur negative. Ua abnormal with pyuria and bacteriuria. Patient was placed on antibiotics. Patient given IV fluids and other supportive care measures. Triaged to Medicine    Mechanical fall  In setting of alcohol use disorder, chronic gait instability, probable UTI, see below. Seen by PT and recommended [facility].     Abnormal UA, unable to rule out UTI, present upon admission  Resolved. UA with mod LE, 5 WBCs, many bacteria. Urine culture 3/18 w/ pan-S GNRs unspecified. Blood culture x 2 3/18 negative. Sepsis ruled out. Completed empiric course of Rocephin. Urine culture 3/22 negative (collected after IV antibiotics). Repeat UA 3/29 negative.    Alcohol use disorder with acute withdrawal  Resolved. Patient consumes approx 3 glasses of wine daily, possibly contributed to fall, last drink was evening of 3/17. Completed standing benzo taper. CIWA protocol. No longer in withdrawal. Cont thiamine/folic acid/MVI. Appreciate Social Work input. Cessation encouraged. Treatment referrals offered.    Sinus tachycardia  Resolved. Likely due to deconditioning, alcohol use disorder with withdrawal, less likely from UTI. HR now WNL.     HTN  BP WNL. Continue current management    HLD  Continue statin    RACHEL  Resolved. Noted 3/28. UA negative. Likely prerenal from combination of decreased PO intake and superimposed stooling from standing lactulose for encephalopathy. Cut back lactulose dosing frequency and gave IV fluids. Cr now improved, back down to 1.1. Less likely was intrarenal injury related to hypotension patient had on 3/27 as patient had rapid renal recovery with IV fluids. ATN ruled out. Doubt post renal issue as patient has been emptying bladder, non-excessive volume measurements on serial bladder scans. Continue to trend Cr. Monitor Is and Os.     Anemia  Stable. Likely multifactorial due to chronic disease, alcohol use disorder, malnutrition. Patient denies melena, denies hematochezia. Iron 53. TIBC 163. Iron sat 33% ferritin 941. B12 level WNL. Continue workup as outpatient.     Thrombocytopenia  Stable. Likely secondary to alcohol use. No splenomegaly on US. Monitored and trended. Latest platelet count 92.     Mild transaminitis  US abdomen w/ hepatic steatosis in setting of alcohol use disorder. Continue to monitor as outpatient. May benefit from an outpatient fibroscan. Can follow up with PCP to arrange.     Hyperammonemia   No clinical evidence of HE but will continue trial lactulose. Ammonia level has normalized. Will stop lactulose and monitor.     Hypokalemia, hypomagnesemia  Repleted K to goal ~4, Mg to ~2    Moderate protein calorie malnutrition  Regular diet supplemented with Ensure Plus BID. Thiamine, folate, MVI.     Acute metabolic encephalopathy  Possibly multifactorial due to fall, UTI, alcohol use disorder, alcohol withdrawal, electrolyte derangement, hyperammonemia, unable to rule out hepatic encephalopathy. Completed abx for UTI. Alcohol withdrawal resolved with Ativan taper and supportive care measures. Electrolytes have been repleted and normalized. Gave empiric lactulose for the hyperammonemia, now normal, held lactulose. TSH and FT4 WNL. B12 WNL. S/P empiric IV thiamine 500mg q8h for a few days, switched to 250mg IV daily on 3/28. Patient is overall improved. Awake and alert. Still confused, suspect underlying neurocognitive impairment from alcoholism, cerebral microvascular disease. Reorient frequently.     Clinically suspected cognitive impairment  In setting of chronic alcohol use. B12 normal. TSH and FT4 WNL CT head with moderate white matter small vessel ischemic disease. Recommend outpatient neurocognitive testing. Referred to Neurology Dr. Quiros.     Severe osteoarthritis of R hip  Not a surgical candidate. No acute injury sustained from fall. Pain control as needed. Supportive care measures. Continue restorative PT sessions.

## 2025-03-28 LAB
AMMONIA BLD-MCNC: 30 UMOL/L — SIGNIFICANT CHANGE UP (ref 11–32)
ANION GAP SERPL CALC-SCNC: 4 MMOL/L — LOW (ref 5–17)
BUN SERPL-MCNC: 21 MG/DL — SIGNIFICANT CHANGE UP (ref 7–23)
CALCIUM SERPL-MCNC: 9.1 MG/DL — SIGNIFICANT CHANGE UP (ref 8.5–10.1)
CHLORIDE SERPL-SCNC: 113 MMOL/L — HIGH (ref 96–108)
CO2 SERPL-SCNC: 22 MMOL/L — SIGNIFICANT CHANGE UP (ref 22–31)
CREAT SERPL-MCNC: 1.58 MG/DL — HIGH (ref 0.5–1.3)
EGFR: 33 ML/MIN/1.73M2 — LOW
EGFR: 33 ML/MIN/1.73M2 — LOW
GLUCOSE SERPL-MCNC: 94 MG/DL — SIGNIFICANT CHANGE UP (ref 70–99)
MAGNESIUM SERPL-MCNC: 2 MG/DL — SIGNIFICANT CHANGE UP (ref 1.6–2.6)
PHOSPHATE SERPL-MCNC: 3.4 MG/DL — SIGNIFICANT CHANGE UP (ref 2.5–4.5)
POTASSIUM SERPL-MCNC: 4 MMOL/L — SIGNIFICANT CHANGE UP (ref 3.5–5.3)
POTASSIUM SERPL-SCNC: 4 MMOL/L — SIGNIFICANT CHANGE UP (ref 3.5–5.3)
SODIUM SERPL-SCNC: 139 MMOL/L — SIGNIFICANT CHANGE UP (ref 135–145)

## 2025-03-28 PROCEDURE — 99233 SBSQ HOSP IP/OBS HIGH 50: CPT

## 2025-03-28 RX ORDER — LACTULOSE 10 G/15ML
20 SOLUTION ORAL
Refills: 0 | Status: DISCONTINUED | OUTPATIENT
Start: 2025-03-28 | End: 2025-03-29

## 2025-03-28 RX ORDER — SODIUM CHLORIDE 9 G/1000ML
1000 INJECTION, SOLUTION INTRAVENOUS
Refills: 0 | Status: DISCONTINUED | OUTPATIENT
Start: 2025-03-28 | End: 2025-03-29

## 2025-03-28 RX ADMIN — LACTULOSE 20 GRAM(S): 10 SOLUTION ORAL at 04:03

## 2025-03-28 RX ADMIN — Medication 250 MILLIGRAM(S): at 11:55

## 2025-03-28 RX ADMIN — LACTULOSE 20 GRAM(S): 10 SOLUTION ORAL at 09:03

## 2025-03-28 RX ADMIN — Medication 40 MILLIGRAM(S): at 09:02

## 2025-03-28 RX ADMIN — LOSARTAN POTASSIUM 100 MILLIGRAM(S): 100 TABLET, FILM COATED ORAL at 04:03

## 2025-03-28 RX ADMIN — Medication 1 TABLET(S): at 11:55

## 2025-03-28 RX ADMIN — METOPROLOL SUCCINATE 100 MILLIGRAM(S): 50 TABLET, EXTENDED RELEASE ORAL at 09:01

## 2025-03-28 RX ADMIN — METOPROLOL SUCCINATE 50 MILLIGRAM(S): 50 TABLET, EXTENDED RELEASE ORAL at 20:34

## 2025-03-28 RX ADMIN — FOLIC ACID 1 MILLIGRAM(S): 1 TABLET ORAL at 11:56

## 2025-03-28 RX ADMIN — Medication 20 MILLIGRAM(S): at 09:03

## 2025-03-28 RX ADMIN — EZETIMIBE 10 MILLIGRAM(S): 10 TABLET ORAL at 11:55

## 2025-03-28 RX ADMIN — ENOXAPARIN SODIUM 40 MILLIGRAM(S): 100 INJECTION SUBCUTANEOUS at 11:55

## 2025-03-28 RX ADMIN — ROSUVASTATIN CALCIUM 10 MILLIGRAM(S): 5 TABLET, FILM COATED ORAL at 20:32

## 2025-03-28 RX ADMIN — Medication 40 MILLIGRAM(S): at 20:32

## 2025-03-28 RX ADMIN — SODIUM CHLORIDE 200 MILLILITER(S): 9 INJECTION, SOLUTION INTRAVENOUS at 09:04

## 2025-03-28 RX ADMIN — MIRTAZAPINE 7.5 MILLIGRAM(S): 30 TABLET, FILM COATED ORAL at 20:32

## 2025-03-28 RX ADMIN — Medication 105 MILLIGRAM(S): at 04:04

## 2025-03-28 RX ADMIN — Medication 20 MILLIGRAM(S): at 20:32

## 2025-03-28 NOTE — PROGRESS NOTE ADULT - ASSESSMENT
81 yo woman with HTN, HLD, alcohol use disorder, GERD, peptic ulcer disease, spinal compression deformities, presented for further evaluation after a mechanical fall. Patient fell from her recliner resulting in R hip pain and trouble ambulating. CT head negative. Imaging of chest, pelvis, hips, femur negative. Ua abnormal with pyuria and bacteriuria. Patient was placed on antibiotics. Patient given IV fluids and other supportive care measures. Triaged to Medicine    Mechanical fall  In setting of alcohol use disorder, chronic gait instability, possible UTI, see below. Seen by PT and recommended [facility].     Abnormal UA, unable to rule out UTI, present upon admission  Resolved. UA with mod LE, 5 WBCs, many bacteria. Urine culture 3/18 w/ pan-S GNRs unspecified. Blood culture x 2 3/18 negative. Sepsis ruled out. Completed empiric course of Rocephin. Urine culture 3/22 negative (collected after IV antibiotics).    Alcohol use disorder with acute withdrawal  Resolved. Patient consumes approx 3 glasses of wine daily, possibly contributed to fall, last drink was evening of 3/17. Completed standing benzo taper. CIWA protocol. No longer in withdrawal. Cont thiamine/folic acid/MVI. Appreciate Social Work input. Cessation encouraged. Treatment referrals offered.    Sinus tachycardia  Resolved. Likely due to deconditioning, alcohol use disorder with withdrawal, less likely from UTI. HR now WNL.     HTN  BP WNL. Continue current management    HLD  Continue statin    RACHEL  Cr uptrended 3/28. Could be prerenal from combination of decreased PO intake and superimposed stooling from standing lactulose for encephalopathy. Cut back lactulose dosing frequency. Ordered IV fluid hydration. Alternatively could be intrarenal injury related to hypotension patient had on 3/27. Thus unable to rule out ATN at this point. Lastly, could be postrenal RACHEL due to poor bladder emptying. Ordered bladder scans to rule out retention. Ordered UA, urine lytes. Continue to trend Cr. Monitor Is and Os.     Anemia  Stable. Likely multifactorial due to chronic disease, alcohol use disorder, malnutrition. Patient denies melena, denies hematochezia. Iron 53. TIBC 163. Iron sat 33% ferritin 941. B12 level WNL. Continue workup as outpatient.     Thrombocytopenia  Stable. Likely secondary to alcohol use. No splenomegaly on US. Monitored and trended.     Mild transaminitis  US abdomen w/ hepatic steatosis in setting of alcohol use disorder. Continue to monitor as outpatient. May benefit from an outpatient fibroscan. Can follow up with PCP to arrange.     Hyperammonemia   No clinical evidence of HE but will continue trial lactulose. Ammonia level is down-trending    Hypokalemia, hypomagnesemia  Repleted K to goal ~4, Mg to ~2    Moderate protein calorie malnutrition  Regular diet supplemented with Ensure Plus BID. Thiamine, folate, MVI.     Acute metabolic encephalopathy  Possibly multifactorial due to fall, UTI, alcohol use disorder, alcohol withdrawal, electrolyte derangement, hyperammonemia, unable to rule out hepatic encephalopathy. S/P IV thiamine 500mg q8h for a few days, switched to 250mg IV daily today. On empiric lactulose. Completed abx for UTI. Alcohol withdrawal resolved with Ativan taper and supportive care measures. Electrolytes have been repleted and normalized. B12 WNL. Patient is overall improved. Awake and alert. Still confused. Continuing empiric IV thiamine and PO lactulose. Checking TSH, FT4.     Clinically suspected cognitive impairment  In setting of chronic alcohol use. B12 normal. Requested thyroid function studies. Pending. CT head with moderate white matter small vessel ischemic disease. Will refer to Neurology as outpatient for formal neurocognitive testing.     Severe osteoarthritis of R hip  Not a surgical candidate. No acute injury sustained from fall. Pain control as needed. Supportive care measures. Continue restorative PT sessions.         Dispo: Anticipate DC to [facility], possibly tomorrow if she continues to improve.  79 yo woman with HTN, HLD, alcohol use disorder, GERD, peptic ulcer disease, spinal compression deformities, presented for further evaluation after a mechanical fall. Patient fell from her recliner resulting in R hip pain and trouble ambulating. CT head negative. Imaging of chest, pelvis, hips, femur negative. Ua abnormal with pyuria and bacteriuria. Patient was placed on antibiotics. Patient given IV fluids and other supportive care measures. Triaged to Medicine    Mechanical fall  In setting of alcohol use disorder, chronic gait instability, possible UTI, see below. Seen by PT and recommended [facility].     Abnormal UA, unable to rule out UTI, present upon admission  Resolved. UA with mod LE, 5 WBCs, many bacteria. Urine culture 3/18 w/ pan-S GNRs unspecified. Blood culture x 2 3/18 negative. Sepsis ruled out. Completed empiric course of Rocephin. Urine culture 3/22 negative (collected after IV antibiotics).    Alcohol use disorder with acute withdrawal  Resolved. Patient consumes approx 3 glasses of wine daily, possibly contributed to fall, last drink was evening of 3/17. Completed standing benzo taper. CIWA protocol. No longer in withdrawal. Cont thiamine/folic acid/MVI. Appreciate Social Work input. Cessation encouraged. Treatment referrals offered.    Sinus tachycardia  Resolved. Likely due to deconditioning, alcohol use disorder with withdrawal, less likely from UTI. HR now WNL.     HTN  BP WNL. Continue current management    HLD  Continue statin    RACHEL  Cr up-trended today, 3/28. Could be prerenal from combination of decreased PO intake and superimposed stooling from standing lactulose for encephalopathy. Cut back lactulose dosing frequency. Ordered IV fluid hydration. Alternatively could be intrarenal injury related to hypotension patient had on 3/27. Thus unable to rule out ATN at this point. Lastly, could be postrenal RACHEL due to poor bladder emptying. Ordered bladder scans to rule out retention. Ordered UA, urine lytes. Continue to trend Cr. Monitor Is and Os.     Anemia  Stable. Likely multifactorial due to chronic disease, alcohol use disorder, malnutrition. Patient denies melena, denies hematochezia. Iron 53. TIBC 163. Iron sat 33% ferritin 941. B12 level WNL. Continue workup as outpatient.     Thrombocytopenia  Stable. Likely secondary to alcohol use. No splenomegaly on US. Monitored and trended.     Mild transaminitis  US abdomen w/ hepatic steatosis in setting of alcohol use disorder. Continue to monitor as outpatient. May benefit from an outpatient fibroscan. Can follow up with PCP to arrange.     Hyperammonemia   No clinical evidence of HE but will continue trial lactulose. Ammonia level is down-trending    Hypokalemia, hypomagnesemia  Repleted K to goal ~4, Mg to ~2    Moderate protein calorie malnutrition  Regular diet supplemented with Ensure Plus BID. Thiamine, folate, MVI.     Acute metabolic encephalopathy  Possibly multifactorial due to fall, UTI, alcohol use disorder, alcohol withdrawal, electrolyte derangement, hyperammonemia, unable to rule out hepatic encephalopathy. S/P IV thiamine 500mg q8h for a few days, switched to 250mg IV daily today. On empiric lactulose. Completed abx for UTI. Alcohol withdrawal resolved with Ativan taper and supportive care measures. Electrolytes have been repleted and normalized. B12 WNL. Patient is overall improved. Awake and alert. Still confused. Continuing empiric IV thiamine and PO lactulose. Checking TSH, FT4.     Clinically suspected cognitive impairment  In setting of chronic alcohol use. B12 normal. Requested thyroid function studies. Pending. CT head with moderate white matter small vessel ischemic disease. Will refer to Neurology as outpatient for formal neurocognitive testing.     Severe osteoarthritis of R hip  Not a surgical candidate. No acute injury sustained from fall. Pain control as needed. Supportive care measures. Continue restorative PT sessions.         Dispo: Anticipate DC to [facility], possibly tomorrow if she continues to improve.

## 2025-03-28 NOTE — PROGRESS NOTE ADULT - SUBJECTIVE AND OBJECTIVE BOX
Chief Complaint: Fall with resultant R hip pain and difficulty ambulating    Interval Hx: Patient seen and examined. OOB in armchair. Resting comfortably. Awake and alert. Improved as compared to admission but still quite confused, encephalopathic. No acute complaints aside for generalized weakness. Denies headache, dizziness, vertigo, focal weakness or numbness. No chest complaints or abdominal complaints. No dysuria. No fever or chills. No rash or synovitis. On empiric IV thiamine, empiric lactulose. Planned for [facility] when medically cleared.     3/28: Today's labs notable for jump in Cr. Started on IV fluid hydration. Ordered for bladder scans.     ROS: Multi system review is comprehensively negative x 10 systems except as above    Vitals:  T(F): 97.6 (28 Mar 2025 07:42), Max: 98.1 (27 Mar 2025 21:04)  HR: 80 (28 Mar 2025 07:42) (71 - 80)  BP: 126/81 (28 Mar 2025 07:42) (87/43 - 152/55)  RR: 18 (28 Mar 2025 07:42) (17 - 18)  SpO2: 98% (28 Mar 2025 07:42) (97% - 100%) on room air    Exam:  Gen: No acute distress  HEENT: NCAT PERRL EOMI MMM clear oropharynx  Neck: Supple, no JVD no LAD  CVS:s1 s2 normal RRR  Chest: Normal resp effort, lungs CTA B/L  Abd: +BS, soft, non distended, non tender  Ext: No edema, no tenderness, intact peripheral pulses  Skin: Warm, dry  Mood: Calm, pleasant  Neuro: Awake and alert, answers simple questions appropriately, follows commands    Labs:                               10.1   4.16 )-----------( 93             30.1          RDW 14    Iron 53    TIBC 163   Iron sat 33%  ferritin 941   B12 level WNL      139  |  113  |  21  -------------------------<  94  4.0   |   22   |  1.58    Ca    9.1      Phos  3.4     Mg   2.0         TPro  5.8  /  Alb  2.7  /  TBili  0.7  /  DBili  x   /  AST  39  /  ALT  43  /  AlkPhos  72      TSH and FT4 WNL    B12 WNL      Ammonia 50 --> 30    UA with mod LE, 5 WBCs, many bacteria.    Micro:  Urine culture 3/22: Negative (collected after IV antibiotics)  Blood culture x 2 3/18: Negative  Urine culture 3/18: pan-S GNRs unspecified    Imaging:  US abdomen 3/20: Hepatic steatosis.    CT bony pelvis 3/18: No acute fracture or dislocation.    CT head 3/18: No significant interval change. No acute intracranial  hemorrhage, midline shift or calvarial fracture. Moderate white matter small vessel ischemic disease.    XR pelvis and right hip 3/18: No acute fracture or dislocation. Some sclerotic changes related to the greater trochanteric region bilaterally.     XR R femur 3/18: No acute fracture or dislocation. Some sclerotic changes related to the greater trochanteric region.     XR chest 3/18: No focal infiltrate or congestion. Some elevation of the left hemidiaphragm noted. Regional osseous structures appropriate for age.     Cardiac Testing:  EKG 3/18: sinus tach    Meds:  MEDICATIONS  (STANDING):  enoxaparin Injectable 40 milliGRAM(s) SubCutaneous every 24 hours  ezetimibe 10 milliGRAM(s) Oral daily  famotidine    Tablet 20 milliGRAM(s) Oral two times a day  folic acid 1 milliGRAM(s) Oral daily  lactated ringers. 1000 milliLiter(s) (200 mL/Hr) IV Continuous <Continuous>  lactulose Syrup 20 Gram(s) Oral two times a day  losartan 100 milliGRAM(s) Oral daily  metoprolol succinate  milliGRAM(s) Oral daily  metoprolol succinate ER 50 milliGRAM(s) Oral at bedtime  mirtazapine 7.5 milliGRAM(s) Oral at bedtime  multivitamin/minerals 1 Tablet(s) Oral daily  pantoprazole    Tablet 40 milliGRAM(s) Oral two times a day  rosuvastatin 10 milliGRAM(s) Oral at bedtime  thiamine Injectable 250 milliGRAM(s) IV Push daily    MEDICATIONS  (PRN):  acetaminophen     Tablet .. 650 milliGRAM(s) Oral every 6 hours PRN Temp greater or equal to 38C (100.4F), Mild Pain (1 - 3)  aluminum hydroxide/magnesium hydroxide/simethicone Suspension 30 milliLiter(s) Oral every 4 hours PRN Dyspepsia  melatonin 3 milliGRAM(s) Oral at bedtime PRN Insomnia  ondansetron Injectable 4 milliGRAM(s) IV Push every 8 hours PRN Nausea and/or Vomiting   Chief Complaint: Fall with resultant R hip pain and difficulty ambulating    Interval Hx: Patient seen and examined. OOB in armchair. Resting comfortably. Awake and alert. Improved as compared to admission but still quite confused, encephalopathic. No acute complaints aside for generalized weakness for which shes planned for [facility] once medically cleared. She denies headache, dizziness, vertigo, focal weakness or numbness. No chest complaints or abdominal complaints. No dysuria. No fever or chills. No rash or synovitis. On empiric IV thiamine, empiric lactulose. Today's labs notable for jump in Cr. Started on IV fluid hydration. Ordered for bladder scans, urine studies.    ROS: Multi system review is comprehensively negative x 10 systems except as above    Vitals:  T(F): 97.6 (28 Mar 2025 07:42), Max: 98.1 (27 Mar 2025 21:04)  HR: 80 (28 Mar 2025 07:42) (71 - 80)  BP: 126/81 (28 Mar 2025 07:42) (87/43 - 152/55)  RR: 18 (28 Mar 2025 07:42) (17 - 18)  SpO2: 98% (28 Mar 2025 07:42) (97% - 100%) on room air    Exam:  Gen: No acute distress  HEENT: NCAT PERRL EOMI MMM clear oropharynx  Neck: Supple, no JVD no LAD  CVS:s1 s2 normal RRR  Chest: Normal resp effort, lungs CTA B/L  Abd: +BS, soft, non distended, non tender  Ext: No edema, no tenderness, intact peripheral pulses  Skin: Warm, dry  Mood: Calm, pleasant  Neuro: Awake and alert, answers simple questions appropriately, follows commands    Labs:                               10.1   4.16 )-----------( 93             30.1          RDW 14    Iron 53    TIBC 163   Iron sat 33%  ferritin 941   B12 level WNL      139  |  113  |  21  -------------------------<  94  4.0   |   22   |  1.58    Ca    9.1      Phos  3.4     Mg   2.0         TPro  5.8  /  Alb  2.7  /  TBili  0.7  /  DBili  x   /  AST  39  /  ALT  43  /  AlkPhos  72      TSH and FT4 WNL    B12 WNL      Ammonia 50 --> 30    UA with mod LE, 5 WBCs, many bacteria.    Micro:  Urine culture 3/22: Negative (collected after IV antibiotics)  Blood culture x 2 3/18: Negative  Urine culture 3/18: pan-S GNRs unspecified    Imaging:  US abdomen 3/20: Hepatic steatosis.    CT bony pelvis 3/18: No acute fracture or dislocation.    CT head 3/18: No significant interval change. No acute intracranial  hemorrhage, midline shift or calvarial fracture. Moderate white matter small vessel ischemic disease.    XR pelvis and right hip 3/18: No acute fracture or dislocation. Some sclerotic changes related to the greater trochanteric region bilaterally.     XR R femur 3/18: No acute fracture or dislocation. Some sclerotic changes related to the greater trochanteric region.     XR chest 3/18: No focal infiltrate or congestion. Some elevation of the left hemidiaphragm noted. Regional osseous structures appropriate for age.     Cardiac Testing:  EKG 3/18: sinus tach    Meds:  MEDICATIONS  (STANDING):  enoxaparin Injectable 40 milliGRAM(s) SubCutaneous every 24 hours  ezetimibe 10 milliGRAM(s) Oral daily  famotidine    Tablet 20 milliGRAM(s) Oral two times a day  folic acid 1 milliGRAM(s) Oral daily  lactated ringers. 1000 milliLiter(s) (200 mL/Hr) IV Continuous <Continuous>  lactulose Syrup 20 Gram(s) Oral two times a day  losartan 100 milliGRAM(s) Oral daily  metoprolol succinate  milliGRAM(s) Oral daily  metoprolol succinate ER 50 milliGRAM(s) Oral at bedtime  mirtazapine 7.5 milliGRAM(s) Oral at bedtime  multivitamin/minerals 1 Tablet(s) Oral daily  pantoprazole    Tablet 40 milliGRAM(s) Oral two times a day  rosuvastatin 10 milliGRAM(s) Oral at bedtime  thiamine Injectable 250 milliGRAM(s) IV Push daily    MEDICATIONS  (PRN):  acetaminophen     Tablet .. 650 milliGRAM(s) Oral every 6 hours PRN Temp greater or equal to 38C (100.4F), Mild Pain (1 - 3)  aluminum hydroxide/magnesium hydroxide/simethicone Suspension 30 milliLiter(s) Oral every 4 hours PRN Dyspepsia  melatonin 3 milliGRAM(s) Oral at bedtime PRN Insomnia  ondansetron Injectable 4 milliGRAM(s) IV Push every 8 hours PRN Nausea and/or Vomiting

## 2025-03-29 LAB
ANION GAP SERPL CALC-SCNC: 7 MMOL/L — SIGNIFICANT CHANGE UP (ref 5–17)
APPEARANCE UR: ABNORMAL
BACTERIA # UR AUTO: NEGATIVE /HPF — SIGNIFICANT CHANGE UP
BILIRUB UR-MCNC: NEGATIVE — SIGNIFICANT CHANGE UP
BUN SERPL-MCNC: 17 MG/DL — SIGNIFICANT CHANGE UP (ref 7–23)
CALCIUM SERPL-MCNC: 8.8 MG/DL — SIGNIFICANT CHANGE UP (ref 8.5–10.1)
CAST: 4 /LPF — SIGNIFICANT CHANGE UP (ref 0–4)
CHLORIDE SERPL-SCNC: 111 MMOL/L — HIGH (ref 96–108)
CO2 SERPL-SCNC: 21 MMOL/L — LOW (ref 22–31)
COLOR SPEC: SIGNIFICANT CHANGE UP
COMMENT - URINE: SIGNIFICANT CHANGE UP
CREAT ?TM UR-MCNC: 161 MG/DL — SIGNIFICANT CHANGE UP
CREAT SERPL-MCNC: 1.11 MG/DL — SIGNIFICANT CHANGE UP (ref 0.5–1.3)
DIFF PNL FLD: NEGATIVE — SIGNIFICANT CHANGE UP
EGFR: 51 ML/MIN/1.73M2 — LOW
EGFR: 51 ML/MIN/1.73M2 — LOW
GLUCOSE SERPL-MCNC: 92 MG/DL — SIGNIFICANT CHANGE UP (ref 70–99)
GLUCOSE UR QL: NEGATIVE MG/DL — SIGNIFICANT CHANGE UP
KETONES UR-MCNC: ABNORMAL MG/DL
LEUKOCYTE ESTERASE UR-ACNC: ABNORMAL
NITRITE UR-MCNC: NEGATIVE — SIGNIFICANT CHANGE UP
PH UR: 5.5 — SIGNIFICANT CHANGE UP (ref 5–8)
POTASSIUM SERPL-MCNC: 3.9 MMOL/L — SIGNIFICANT CHANGE UP (ref 3.5–5.3)
POTASSIUM SERPL-SCNC: 3.9 MMOL/L — SIGNIFICANT CHANGE UP (ref 3.5–5.3)
PROT UR-MCNC: 30 MG/DL
RBC CASTS # UR COMP ASSIST: 49 /HPF — HIGH (ref 0–4)
SODIUM SERPL-SCNC: 139 MMOL/L — SIGNIFICANT CHANGE UP (ref 135–145)
SODIUM UR-SCNC: 69 MMOL/L — SIGNIFICANT CHANGE UP
SP GR SPEC: 1.02 — SIGNIFICANT CHANGE UP (ref 1–1.03)
SQUAMOUS # UR AUTO: 7 /HPF — HIGH (ref 0–5)
UROBILINOGEN FLD QL: 0.2 MG/DL — SIGNIFICANT CHANGE UP (ref 0.2–1)
VIT B1 SERPL-MCNC: 199.7 NMOL/L — SIGNIFICANT CHANGE UP (ref 66.5–200)
WBC UR QL: 4 /HPF — SIGNIFICANT CHANGE UP (ref 0–5)

## 2025-03-29 PROCEDURE — 99233 SBSQ HOSP IP/OBS HIGH 50: CPT

## 2025-03-29 RX ORDER — SODIUM CHLORIDE 9 G/1000ML
1000 INJECTION, SOLUTION INTRAVENOUS
Refills: 0 | Status: DISCONTINUED | OUTPATIENT
Start: 2025-03-29 | End: 2025-03-30

## 2025-03-29 RX ADMIN — Medication 250 MILLIGRAM(S): at 09:37

## 2025-03-29 RX ADMIN — ROSUVASTATIN CALCIUM 10 MILLIGRAM(S): 5 TABLET, FILM COATED ORAL at 23:11

## 2025-03-29 RX ADMIN — Medication 1 TABLET(S): at 09:37

## 2025-03-29 RX ADMIN — ENOXAPARIN SODIUM 40 MILLIGRAM(S): 100 INJECTION SUBCUTANEOUS at 13:18

## 2025-03-29 RX ADMIN — EZETIMIBE 10 MILLIGRAM(S): 10 TABLET ORAL at 09:38

## 2025-03-29 RX ADMIN — LOSARTAN POTASSIUM 100 MILLIGRAM(S): 100 TABLET, FILM COATED ORAL at 06:39

## 2025-03-29 RX ADMIN — METOPROLOL SUCCINATE 100 MILLIGRAM(S): 50 TABLET, EXTENDED RELEASE ORAL at 09:37

## 2025-03-29 RX ADMIN — Medication 40 MILLIGRAM(S): at 23:11

## 2025-03-29 RX ADMIN — Medication 20 MILLIGRAM(S): at 09:38

## 2025-03-29 RX ADMIN — Medication 20 MILLIGRAM(S): at 23:10

## 2025-03-29 RX ADMIN — Medication 40 MILLIGRAM(S): at 09:37

## 2025-03-29 RX ADMIN — MIRTAZAPINE 7.5 MILLIGRAM(S): 30 TABLET, FILM COATED ORAL at 23:10

## 2025-03-29 RX ADMIN — FOLIC ACID 1 MILLIGRAM(S): 1 TABLET ORAL at 09:38

## 2025-03-29 RX ADMIN — SODIUM CHLORIDE 200 MILLILITER(S): 9 INJECTION, SOLUTION INTRAVENOUS at 12:17

## 2025-03-29 RX ADMIN — METOPROLOL SUCCINATE 50 MILLIGRAM(S): 50 TABLET, EXTENDED RELEASE ORAL at 23:10

## 2025-03-29 NOTE — PROGRESS NOTE ADULT - SUBJECTIVE AND OBJECTIVE BOX
Chief Complaint: Fall with resultant R hip pain and difficulty ambulating    Interval Hx: Patient seen and examined. Resting comfortably. Awake and alert. Improved as compared to admission but still confused at times, No acute complaints aside for generalized weakness for which shes planned for [facility]. She denies headache, dizziness, vertigo, focal weakness or numbness. No chest complaints or abdominal complaints. No dysuria. No fever or chills. No rash or synovitis. On empiric IV thiamine. S/P empiric lactulose with normalization of previously elevated ammonia level. Cr improved today with her having received IV fluid hydration yesterday. Normal bladder scans. UA negative for infection, +trace ketones. Ordered for a bit more IV fluids today, continued IV thiamine, now 250mg IV daily.     ROS: Multi system review is comprehensively negative x 10 systems except as above    Vitals:  T(F): 98.4 (29 Mar 2025 08:14), Max: 98.4 (29 Mar 2025 08:14)  HR: 73 (29 Mar 2025 08:14) (73 - 82)  BP: 135/60 (29 Mar 2025 08:14) (109/64 - 160/59)  RR: 18 (29 Mar 2025 08:14) (18 - 18)  SpO2: 96% (29 Mar 2025 08:14) (96% - 98%) on room air    Exam:  Gen: No acute distress  HEENT: NCAT PERRL EOMI MMM clear oropharynx  Neck: Supple, no JVD no LAD  CVS:s1 s2 normal RRR  Chest: Normal resp effort, lungs CTA B/L  Abd: +BS, soft, non distended, non tender  Ext: No edema, no tenderness, intact peripheral pulses  Skin: Warm, dry  Mood: Calm, pleasant  Neuro: Awake and alert, answers simple questions appropriately, follows commands    Labs:                               10.1   4.16 )-----------( 93             30.1          RDW 14    Iron 53    TIBC 163   Iron sat 33%  ferritin 941   B12 level WNL      139  |  111   |  17  ----------------------<  92  3.9   |   21   |  1.11    Ca    8.8      Phos  3.4     Mg  2.0         TPro  5.8  /  Alb  2.7  /  TBili  0.7  /  DBili  x   /  AST  39  /  ALT  43  /  AlkPhos  72      TSH and FT4 WNL    B12 WNL      Ammonia 50 --> 30    UA with mod LE, 5 WBCs, many bacteria.    Micro:  Urine culture 3/22: Negative (collected after IV antibiotics)  Blood culture x 2 3/18: Negative  Urine culture 3/18: pan-S GNRs unspecified    Imaging:  US abdomen 3/20: Hepatic steatosis.    CT bony pelvis 3/18: No acute fracture or dislocation.    CT head 3/18: No significant interval change. No acute intracranial  hemorrhage, midline shift or calvarial fracture. Moderate white matter small vessel ischemic disease.    XR pelvis and right hip 3/18: No acute fracture or dislocation. Some sclerotic changes related to the greater trochanteric region bilaterally.     XR R femur 3/18: No acute fracture or dislocation. Some sclerotic changes related to the greater trochanteric region.     XR chest 3/18: No focal infiltrate or congestion. Some elevation of the left hemidiaphragm noted. Regional osseous structures appropriate for age.     Cardiac Testing:  EKG 3/18: sinus tach    Meds:  MEDICATIONS  (STANDING):  enoxaparin Injectable 40 milliGRAM(s) SubCutaneous every 24 hours  ezetimibe 10 milliGRAM(s) Oral daily  famotidine    Tablet 20 milliGRAM(s) Oral two times a day  folic acid 1 milliGRAM(s) Oral daily  lactated ringers. 1000 milliLiter(s) (200 mL/Hr) IV Continuous <Continuous>  losartan 100 milliGRAM(s) Oral daily  metoprolol succinate ER 50 milliGRAM(s) Oral at bedtime  metoprolol succinate  milliGRAM(s) Oral daily  mirtazapine 7.5 milliGRAM(s) Oral at bedtime  multivitamin/minerals 1 Tablet(s) Oral daily  pantoprazole    Tablet 40 milliGRAM(s) Oral two times a day  rosuvastatin 10 milliGRAM(s) Oral at bedtime  thiamine Injectable 250 milliGRAM(s) IV Push daily    MEDICATIONS  (PRN):  acetaminophen     Tablet .. 650 milliGRAM(s) Oral every 6 hours PRN Temp greater or equal to 38C (100.4F), Mild Pain (1 - 3)  aluminum hydroxide/magnesium hydroxide/simethicone Suspension 30 milliLiter(s) Oral every 4 hours PRN Dyspepsia  melatonin 3 milliGRAM(s) Oral at bedtime PRN Insomnia  ondansetron Injectable 4 milliGRAM(s) IV Push every 8 hours PRN Nausea and/or Vomiting   Chief Complaint: Fall with resultant R hip pain and difficulty ambulating    Interval Hx: Patient seen and examined. Resting comfortably. Awake and alert. Improved as compared to admission but still confused at times. No acute complaints aside for generalized weakness for which shes planned for [facility]. She denies headache, dizziness, vertigo, focal weakness or numbness. No chest complaints or abdominal complaints. No dysuria. No fever or chills. No rash or synovitis. On empiric IV thiamine. S/P empiric lactulose with normalization of previously elevated ammonia level. Cr improved with her having received IV fluid hydration. Normal bladder scans. UA negative for infection, +trace ketones. Ordered for a bit more IV fluids today, continued IV thiamine, now 250mg IV daily. Approaching stability for DC to [facility].      ROS: Multi system review is comprehensively negative x 10 systems except as above    Vitals:  T(F): 98.4 (29 Mar 2025 08:14), Max: 98.4 (29 Mar 2025 08:14)  HR: 73 (29 Mar 2025 08:14) (73 - 82)  BP: 135/60 (29 Mar 2025 08:14) (109/64 - 160/59)  RR: 18 (29 Mar 2025 08:14) (18 - 18)  SpO2: 96% (29 Mar 2025 08:14) (96% - 98%) on room air    Exam:  Gen: No acute distress  HEENT: NCAT PERRL EOMI MMM clear oropharynx  Neck: Supple, no JVD no LAD  CVS:s1 s2 normal RRR  Chest: Normal resp effort, lungs CTA B/L  Abd: +BS, soft, non distended, non tender  Ext: No edema, no tenderness, intact peripheral pulses  Skin: Warm, dry  Mood: Calm, pleasant  Neuro: Awake and alert, answers simple questions appropriately, follows commands    Labs:                               10.1   4.16 )-----------( 93             30.1          RDW 14    Iron 53    TIBC 163   Iron sat 33%  ferritin 941   B12 level WNL      139  |  111   |  17  ----------------------<  92  3.9   |   21   |  1.11    Ca    8.8      Phos  3.4     Mg  2.0         TPro  5.8  /  Alb  2.7  /  TBili  0.7  /  DBili  x   /  AST  39  /  ALT  43  /  AlkPhos  72      TSH and FT4 WNL    B12 WNL      Ammonia 50 --> 30    UA with mod LE, 5 WBCs, many bacteria.    Micro:  Urine culture 3/22: Negative (collected after IV antibiotics)  Blood culture x 2 3/18: Negative  Urine culture 3/18: pan-S GNRs unspecified    Imaging:  US abdomen 3/20: Hepatic steatosis.    CT bony pelvis 3/18: No acute fracture or dislocation.    CT head 3/18: No significant interval change. No acute intracranial  hemorrhage, midline shift or calvarial fracture. Moderate white matter small vessel ischemic disease.    XR pelvis and right hip 3/18: No acute fracture or dislocation. Some sclerotic changes related to the greater trochanteric region bilaterally.     XR R femur 3/18: No acute fracture or dislocation. Some sclerotic changes related to the greater trochanteric region.     XR chest 3/18: No focal infiltrate or congestion. Some elevation of the left hemidiaphragm noted. Regional osseous structures appropriate for age.     Cardiac Testing:  EKG 3/18: sinus tach    Meds:  MEDICATIONS  (STANDING):  enoxaparin Injectable 40 milliGRAM(s) SubCutaneous every 24 hours  ezetimibe 10 milliGRAM(s) Oral daily  famotidine    Tablet 20 milliGRAM(s) Oral two times a day  folic acid 1 milliGRAM(s) Oral daily  lactated ringers. 1000 milliLiter(s) (200 mL/Hr) IV Continuous <Continuous>  losartan 100 milliGRAM(s) Oral daily  metoprolol succinate ER 50 milliGRAM(s) Oral at bedtime  metoprolol succinate  milliGRAM(s) Oral daily  mirtazapine 7.5 milliGRAM(s) Oral at bedtime  multivitamin/minerals 1 Tablet(s) Oral daily  pantoprazole    Tablet 40 milliGRAM(s) Oral two times a day  rosuvastatin 10 milliGRAM(s) Oral at bedtime  thiamine Injectable 250 milliGRAM(s) IV Push daily    MEDICATIONS  (PRN):  acetaminophen     Tablet .. 650 milliGRAM(s) Oral every 6 hours PRN Temp greater or equal to 38C (100.4F), Mild Pain (1 - 3)  aluminum hydroxide/magnesium hydroxide/simethicone Suspension 30 milliLiter(s) Oral every 4 hours PRN Dyspepsia  melatonin 3 milliGRAM(s) Oral at bedtime PRN Insomnia  ondansetron Injectable 4 milliGRAM(s) IV Push every 8 hours PRN Nausea and/or Vomiting

## 2025-03-29 NOTE — PROGRESS NOTE ADULT - ASSESSMENT
80 year-old woman with HTN, HLD, alcohol use disorder, GERD, peptic ulcer disease, spinal compression deformities, presented for further evaluation after a mechanical fall. Patient fell from her recliner resulting in R hip pain and trouble ambulating. CT head negative. Imaging of chest, pelvis, hips, femur negative. Ua abnormal with pyuria and bacteriuria. Patient was placed on antibiotics. Patient given IV fluids and other supportive care measures. Triaged to Medicine    Mechanical fall  In setting of alcohol use disorder, chronic gait instability, probable UTI, see below. Seen by PT and recommended [facility].     Abnormal UA, unable to rule out UTI, present upon admission  Resolved. UA with mod LE, 5 WBCs, many bacteria. Urine culture 3/18 w/ pan-S GNRs unspecified. Blood culture x 2 3/18 negative. Sepsis ruled out. Completed empiric course of Rocephin. Urine culture 3/22 negative (collected after IV antibiotics).    Alcohol use disorder with acute withdrawal  Resolved. Patient consumes approx 3 glasses of wine daily, possibly contributed to fall, last drink was evening of 3/17. Completed standing benzo taper. CIWA protocol. No longer in withdrawal. Cont thiamine/folic acid/MVI. Appreciate Social Work input. Cessation encouraged. Treatment referrals offered.    Sinus tachycardia  Resolved. Likely due to deconditioning, alcohol use disorder with withdrawal, less likely from UTI. HR now WNL.     HTN  BP WNL. Continue current management    HLD  Continue statin    RACHEL  Noted 3/28. Likely prerenal from combination of decreased PO intake and superimposed stooling from standing lactulose for encephalopathy. Cut back lactulose dosing frequency and gave IV fluids. Cr now improved, back down to 1.1. Less likely intrarenal injury related to hypotension patient had on 3/27 as patient had rapid renal recovery with IV fluids. ATN ruled out. Doubt post renal issue as patient has been emptying bladder, non-excessive volume measurements on serial bladder scans. Continue to trend Cr. Monitor Is and Os.     Anemia  Stable. Likely multifactorial due to chronic disease, alcohol use disorder, malnutrition. Patient denies melena, denies hematochezia. Iron 53. TIBC 163. Iron sat 33% ferritin 941. B12 level WNL. Continue workup as outpatient.     Thrombocytopenia  Stable. Likely secondary to alcohol use. No splenomegaly on US. Monitored and trended. Latest platelet count 92.     Mild transaminitis  US abdomen w/ hepatic steatosis in setting of alcohol use disorder. Continue to monitor as outpatient. May benefit from an outpatient fibroscan. Can follow up with PCP to arrange.     Hyperammonemia   No clinical evidence of HE but will continue trial lactulose. Ammonia level has normalized. Will stop lactulose and monitor.     Hypokalemia, hypomagnesemia  Repleted K to goal ~4, Mg to ~2    Moderate protein calorie malnutrition  Regular diet supplemented with Ensure Plus BID. Thiamine, folate, MVI.     Acute metabolic encephalopathy  Possibly multifactorial due to fall, UTI, alcohol use disorder, alcohol withdrawal, electrolyte derangement, hyperammonemia, unable to rule out hepatic encephalopathy. Completed abx for UTI. Alcohol withdrawal resolved with Ativan taper and supportive care measures. Electrolytes have been repleted and normalized. Gave empiric lactulose for the hyperammonemia, now normal, held lactulose. TSH and FT4 WNL. B12 WNL. S/P empiric IV thiamine 500mg q8h for a few days, switched to 250mg IV daily on 3/28. Patient is overall improved. Awake and alert. Still somewhat confused, suspect underlying neurocognitive impairment from alcoholism, cerebral microvascular disease. Reorient frequently. Continue to monitor.    Clinically suspected cognitive impairment  In setting of chronic alcohol use. B12 normal. TSH and FT4 WNL CT head with moderate white matter small vessel ischemic disease. Will refer to Neurology as outpatient for formal neurocognitive testing.     Severe osteoarthritis of R hip  Not a surgical candidate. No acute injury sustained from fall. Pain control as needed. Supportive care measures. Continue restorative PT sessions.         Dispo: Anticipate DC to [facility], possibly tomorrow if she continues to improve.

## 2025-03-30 VITALS
DIASTOLIC BLOOD PRESSURE: 64 MMHG | OXYGEN SATURATION: 99 % | HEART RATE: 79 BPM | RESPIRATION RATE: 18 BRPM | TEMPERATURE: 98 F | SYSTOLIC BLOOD PRESSURE: 159 MMHG

## 2025-03-30 PROCEDURE — 99239 HOSP IP/OBS DSCHRG MGMT >30: CPT

## 2025-03-30 RX ORDER — ACETAMINOPHEN 500 MG/5ML
2 LIQUID (ML) ORAL
Qty: 0 | Refills: 0 | DISCHARGE
Start: 2025-03-30

## 2025-03-30 RX ORDER — MIRTAZAPINE 30 MG/1
1 TABLET, FILM COATED ORAL
Qty: 0 | Refills: 0 | DISCHARGE
Start: 2025-03-30

## 2025-03-30 RX ADMIN — EZETIMIBE 10 MILLIGRAM(S): 10 TABLET ORAL at 09:22

## 2025-03-30 RX ADMIN — Medication 20 MILLIGRAM(S): at 09:22

## 2025-03-30 RX ADMIN — Medication 40 MILLIGRAM(S): at 09:21

## 2025-03-30 RX ADMIN — LOSARTAN POTASSIUM 100 MILLIGRAM(S): 100 TABLET, FILM COATED ORAL at 05:17

## 2025-03-30 RX ADMIN — METOPROLOL SUCCINATE 100 MILLIGRAM(S): 50 TABLET, EXTENDED RELEASE ORAL at 09:21

## 2025-03-30 RX ADMIN — FOLIC ACID 1 MILLIGRAM(S): 1 TABLET ORAL at 09:22

## 2025-03-30 RX ADMIN — Medication 1 TABLET(S): at 09:21

## 2025-03-30 NOTE — PROGRESS NOTE ADULT - ASSESSMENT
80 year-old woman with HTN, HLD, alcohol use disorder, GERD, peptic ulcer disease, spinal compression deformities, presented for further evaluation after a mechanical fall. Patient fell from her recliner resulting in R hip pain and trouble ambulating. CT head negative. Imaging of chest, pelvis, hips, femur negative. Ua abnormal with pyuria and bacteriuria. Patient was placed on antibiotics. Patient given IV fluids and other supportive care measures. Triaged to Medicine    Mechanical fall  In setting of alcohol use disorder, chronic gait instability, probable UTI, see below. Seen by PT and recommended [facility].     Abnormal UA, unable to rule out UTI, present upon admission  Resolved. UA with mod LE, 5 WBCs, many bacteria. Urine culture 3/18 w/ pan-S GNRs unspecified. Blood culture x 2 3/18 negative. Sepsis ruled out. Completed empiric course of Rocephin. Urine culture 3/22 negative (collected after IV antibiotics). Repeat UA 3/29 negative.    Alcohol use disorder with acute withdrawal  Resolved. Patient consumes approx 3 glasses of wine daily, possibly contributed to fall, last drink was evening of 3/17. Completed standing benzo taper. CIWA protocol. No longer in withdrawal. Cont thiamine/folic acid/MVI. Appreciate Social Work input. Cessation encouraged. Treatment referrals offered.    Sinus tachycardia  Resolved. Likely due to deconditioning, alcohol use disorder with withdrawal, less likely from UTI. HR now WNL.     HTN  BP WNL. Continue current management    HLD  Continue statin    RACHEL  Resolved. Noted 3/28. UA negative. Likely prerenal from combination of decreased PO intake and superimposed stooling from standing lactulose for encephalopathy. Cut back lactulose dosing frequency and gave IV fluids. Cr now improved, back down to 1.1. Less likely was intrarenal injury related to hypotension patient had on 3/27 as patient had rapid renal recovery with IV fluids. ATN ruled out. Doubt post renal issue as patient has been emptying bladder, non-excessive volume measurements on serial bladder scans. Continue to trend Cr. Monitor Is and Os.     Anemia  Stable. Likely multifactorial due to chronic disease, alcohol use disorder, malnutrition. Patient denies melena, denies hematochezia. Iron 53. TIBC 163. Iron sat 33% ferritin 941. B12 level WNL. Continue workup as outpatient.     Thrombocytopenia  Stable. Likely secondary to alcohol use. No splenomegaly on US. Monitored and trended. Latest platelet count 92.     Mild transaminitis  US abdomen w/ hepatic steatosis in setting of alcohol use disorder. Continue to monitor as outpatient. May benefit from an outpatient fibroscan. Can follow up with PCP to arrange.     Hyperammonemia   No clinical evidence of HE but will continue trial lactulose. Ammonia level has normalized. Will stop lactulose and monitor.     Hypokalemia, hypomagnesemia  Repleted K to goal ~4, Mg to ~2    Moderate protein calorie malnutrition  Regular diet supplemented with Ensure Plus BID. Thiamine, folate, MVI.     Acute metabolic encephalopathy  Possibly multifactorial due to fall, UTI, alcohol use disorder, alcohol withdrawal, electrolyte derangement, hyperammonemia, unable to rule out hepatic encephalopathy. Completed abx for UTI. Alcohol withdrawal resolved with Ativan taper and supportive care measures. Electrolytes have been repleted and normalized. Gave empiric lactulose for the hyperammonemia, now normal, held lactulose. TSH and FT4 WNL. B12 WNL. S/P empiric IV thiamine 500mg q8h for a few days, switched to 250mg IV daily on 3/28. Patient is overall improved. Awake and alert. Still somewhat confused, suspect underlying neurocognitive impairment from alcoholism, cerebral microvascular disease. Reorient frequently. Continue to monitor.    Clinically suspected cognitive impairment  In setting of chronic alcohol use. B12 normal. TSH and FT4 WNL CT head with moderate white matter small vessel ischemic disease. Will refer to Neurology as outpatient for formal neurocognitive testing.     Severe osteoarthritis of R hip  Not a surgical candidate. No acute injury sustained from fall. Pain control as needed. Supportive care measures. Continue restorative PT sessions.         Dispo: Anticipate DC to [facility]   80 year-old woman with HTN, HLD, alcohol use disorder, GERD, peptic ulcer disease, spinal compression deformities, presented for further evaluation after a mechanical fall. Patient fell from her recliner resulting in R hip pain and trouble ambulating. CT head negative. Imaging of chest, pelvis, hips, femur negative. Ua abnormal with pyuria and bacteriuria. Patient was placed on antibiotics. Patient given IV fluids and other supportive care measures. Triaged to Medicine    Mechanical fall  In setting of alcohol use disorder, chronic gait instability, probable UTI, see below. Seen by PT and recommended [facility].     Abnormal UA, unable to rule out UTI, present upon admission  Resolved. UA with mod LE, 5 WBCs, many bacteria. Urine culture 3/18 w/ pan-S GNRs unspecified. Blood culture x 2 3/18 negative. Sepsis ruled out. Completed empiric course of Rocephin. Urine culture 3/22 negative (collected after IV antibiotics). Repeat UA 3/29 negative.    Alcohol use disorder with acute withdrawal  Resolved. Patient consumes approx 3 glasses of wine daily, possibly contributed to fall, last drink was evening of 3/17. Completed standing benzo taper. CIWA protocol. No longer in withdrawal. Cont thiamine/folic acid/MVI. Appreciate Social Work input. Cessation encouraged. Treatment referrals offered.    Sinus tachycardia  Resolved. Likely due to deconditioning, alcohol use disorder with withdrawal, less likely from UTI. HR now WNL.     HTN  BP WNL. Continue current management    HLD  Continue statin    RACHEL  Resolved. Noted 3/28. UA negative. Likely prerenal from combination of decreased PO intake and superimposed stooling from standing lactulose for encephalopathy. Cut back lactulose dosing frequency and gave IV fluids. Cr now improved, back down to 1.1. Less likely was intrarenal injury related to hypotension patient had on 3/27 as patient had rapid renal recovery with IV fluids. ATN ruled out. Doubt post renal issue as patient has been emptying bladder, non-excessive volume measurements on serial bladder scans. Continue to trend Cr. Monitor Is and Os.     Anemia  Stable. Likely multifactorial due to chronic disease, alcohol use disorder, malnutrition. Patient denies melena, denies hematochezia. Iron 53. TIBC 163. Iron sat 33% ferritin 941. B12 level WNL. Continue workup as outpatient.     Thrombocytopenia  Stable. Likely secondary to alcohol use. No splenomegaly on US. Monitored and trended. Latest platelet count 92.     Mild transaminitis  US abdomen w/ hepatic steatosis in setting of alcohol use disorder. Continue to monitor as outpatient. May benefit from an outpatient fibroscan. Can follow up with PCP to arrange.     Hyperammonemia   No clinical evidence of HE but will continue trial lactulose. Ammonia level has normalized. Will stop lactulose and monitor.     Hypokalemia, hypomagnesemia  Repleted K to goal ~4, Mg to ~2    Moderate protein calorie malnutrition  Regular diet supplemented with Ensure Plus BID. Thiamine, folate, MVI.     Acute metabolic encephalopathy  Possibly multifactorial due to fall, UTI, alcohol use disorder, alcohol withdrawal, electrolyte derangement, hyperammonemia, unable to rule out hepatic encephalopathy. Completed abx for UTI. Alcohol withdrawal resolved with Ativan taper and supportive care measures. Electrolytes have been repleted and normalized. Gave empiric lactulose for the hyperammonemia, now normal, held lactulose. TSH and FT4 WNL. B12 WNL. S/P empiric IV thiamine 500mg q8h for a few days, switched to 250mg IV daily on 3/28. Patient is overall improved. Awake and alert. Still confused, suspect underlying neurocognitive impairment from alcoholism, cerebral microvascular disease. Reorient frequently.     Clinically suspected cognitive impairment  In setting of chronic alcohol use. B12 normal. TSH and FT4 WNL CT head with moderate white matter small vessel ischemic disease. Recommend outpatient neurocognitive testing. Referred to Neurology Dr. Quiros.     Severe osteoarthritis of R hip  Not a surgical candidate. No acute injury sustained from fall. Pain control as needed. Supportive care measures. Continue restorative PT sessions.       Dispo: Stable for DC to [facility]

## 2025-03-30 NOTE — PROGRESS NOTE ADULT - SUBJECTIVE AND OBJECTIVE BOX
Chief Complaint: Fall with resultant R hip pain and difficulty ambulating    Interval Hx: Patient seen and examined. Resting comfortably. Awake and alert. Improved as compared to admission but still confused at times. No acute complaints aside for generalized weakness for which shes planned for [facility]. She denies headache, dizziness, vertigo, focal weakness or numbness. No chest complaints or abdominal complaints. No dysuria. No fever or chills. No rash or synovitis. On empiric IV thiamine. S/P empiric lactulose with normalization of previously elevated ammonia level. Cr improved with her having received IV fluid hydration. Normal bladder scans. UA negative for infection, +trace ketones. Ordered for a bit more IV fluids today, continued IV thiamine, now 250mg IV daily. Approaching stability for DC to [facility].      3/30:    ROS: Multi system review is comprehensively negative x 10 systems except as above    Vitals:  T(F): 98.1 (30 Mar 2025 08:35), Max: 99.1 (29 Mar 2025 16:01)  HR: 79 (30 Mar 2025 08:35) (77 - 88)  BP: 159/64 (30 Mar 2025 08:35) (113/55 - 159/64)  RR: 18 (30 Mar 2025 08:35) (17 - 18)  SpO2: 99% (30 Mar 2025 08:35) (99% - 100%) on room air    Exam:  Gen: No acute distress  HEENT: NCAT PERRL EOMI MMM clear oropharynx  Neck: Supple, no JVD no LAD  CVS:s1 s2 normal RRR  Chest: Normal resp effort, lungs CTA B/L  Abd: +BS, soft, non distended, non tender  Ext: No edema, no tenderness, intact peripheral pulses  Skin: Warm, dry  Mood: Calm, pleasant  Neuro: Awake and alert, answers simple questions appropriately, follows commands    Labs:                               10.1   4.16 )-----------( 93             30.1          RDW 14    Iron 53    TIBC 163   Iron sat 33%  ferritin 941   B12 level WNL      139  |  111   |  17  ----------------------<  92  3.9   |   21   |  1.11    Ca    8.8      Phos  3.4     Mg  2.0         TPro  5.8  /  Alb  2.7  /  TBili  0.7  /  DBili  x   /  AST  39  /  ALT  43  /  AlkPhos  72      TSH and FT4 WNL    B12 WNL      Ammonia 50 --> 30    Ur Na 69  Ur Cr 161   FENa 0.34%    UA 3/29: Cloudy, dark yellow, trace ket, prot 30, N-, mod LE, 4 WBC, 49 RBC, bact none    UA 3/22: with mod LE, 5 WBCs, many bacteria.    Micro:  Urine culture 3/22: Negative (collected after IV antibiotics)  Blood culture x 2 3/18: Negative  Urine culture 3/18: pan-S GNRs unspecified    Imaging:  US abdomen 3/20: Hepatic steatosis.    CT bony pelvis 3/18: No acute fracture or dislocation.    CT head 3/18: No significant interval change. No acute intracranial  hemorrhage, midline shift or calvarial fracture. Moderate white matter small vessel ischemic disease.    XR pelvis and right hip 3/18: No acute fracture or dislocation. Some sclerotic changes related to the greater trochanteric region bilaterally.     XR R femur 3/18: No acute fracture or dislocation. Some sclerotic changes related to the greater trochanteric region.     XR chest 3/18: No focal infiltrate or congestion. Some elevation of the left hemidiaphragm noted. Regional osseous structures appropriate for age.     Cardiac Testing:  EKG 3/18: sinus tach    Meds:  MEDICATIONS  (STANDING):  enoxaparin Injectable 40 milliGRAM(s) SubCutaneous every 24 hours  ezetimibe 10 milliGRAM(s) Oral daily  famotidine    Tablet 20 milliGRAM(s) Oral two times a day  folic acid 1 milliGRAM(s) Oral daily  lactated ringers. 1000 milliLiter(s) (200 mL/Hr) IV Continuous <Continuous>  losartan 100 milliGRAM(s) Oral daily  metoprolol succinate  milliGRAM(s) Oral daily  metoprolol succinate ER 50 milliGRAM(s) Oral at bedtime  mirtazapine 7.5 milliGRAM(s) Oral at bedtime  multivitamin/minerals 1 Tablet(s) Oral daily  pantoprazole    Tablet 40 milliGRAM(s) Oral two times a day  rosuvastatin 10 milliGRAM(s) Oral at bedtime  thiamine Injectable 250 milliGRAM(s) IV Push daily    MEDICATIONS  (PRN):  acetaminophen     Tablet .. 650 milliGRAM(s) Oral every 6 hours PRN Temp greater or equal to 38C (100.4F), Mild Pain (1 - 3)  aluminum hydroxide/magnesium hydroxide/simethicone Suspension 30 milliLiter(s) Oral every 4 hours PRN Dyspepsia  melatonin 3 milliGRAM(s) Oral at bedtime PRN Insomnia  ondansetron Injectable 4 milliGRAM(s) IV Push every 8 hours PRN Nausea and/or Vomiting   Chief Complaint: Fall with resultant R hip pain and difficulty ambulating    Interval Hx: Patient seen and examined. Resting comfortably. Awake and alert. Improved as compared to admission but still confused. No acute complaints aside for generalized weakness for which shes planned for [facility]. She denies headache, dizziness, vertigo, focal weakness or numbness. No chest complaints or abdominal complaints. No dysuria. No fever or chills. No rash or synovitis. On empiric IV thiamine. S/P empiric lactulose with normalization of previously elevated ammonia level. Cr improved with her having received IV fluid hydration. Normal bladder scans. UA negative for infection. Stable for DC to [facility].      ROS: Multi system review is comprehensively negative x 10 systems except as above    Vitals:  T(F): 98.1 (30 Mar 2025 08:35), Max: 99.1 (29 Mar 2025 16:01)  HR: 79 (30 Mar 2025 08:35) (77 - 88)  BP: 159/64 (30 Mar 2025 08:35) (113/55 - 159/64)  RR: 18 (30 Mar 2025 08:35) (17 - 18)  SpO2: 99% (30 Mar 2025 08:35) (99% - 100%) on room air    Exam:  Gen: No acute distress  HEENT: NCAT PERRL EOMI MMM clear oropharynx  Neck: Supple, no JVD no LAD  CVS:s1 s2 normal RRR  Chest: Normal resp effort, lungs CTA B/L  Abd: +BS, soft, non distended, non tender  Ext: No edema, no tenderness, intact peripheral pulses  Skin: Warm, dry  Mood: Calm, pleasant  Neuro: Awake and alert, answers simple questions appropriately, follows commands    Labs:                               10.1   4.16 )-----------( 93             30.1          RDW 14    Iron 53    TIBC 163   Iron sat 33%  ferritin 941   B12 level WNL      139  |  111   |  17  ----------------------<  92  3.9   |   21   |  1.11    Ca    8.8      Phos  3.4     Mg  2.0         TPro  5.8  /  Alb  2.7  /  TBili  0.7  /  DBili  x   /  AST  39  /  ALT  43  /  AlkPhos  72      TSH and FT4 WNL    B12 WNL      Ammonia 50 --> 30    Ur Na 69  Ur Cr 161   FENa 0.34%    UA 3/29: Cloudy, dark yellow, trace ket, prot 30, N-, mod LE, 4 WBC, 49 RBC, bact none    UA 3/22: with mod LE, 5 WBCs, many bacteria.    Micro:  Urine culture 3/22: Negative (collected after IV antibiotics)  Blood culture x 2 3/18: Negative  Urine culture 3/18: pan-S GNRs unspecified    Imaging:  US abdomen 3/20: Hepatic steatosis.    CT bony pelvis 3/18: No acute fracture or dislocation.    CT head 3/18: No significant interval change. No acute intracranial  hemorrhage, midline shift or calvarial fracture. Moderate white matter small vessel ischemic disease.    XR pelvis and right hip 3/18: No acute fracture or dislocation. Some sclerotic changes related to the greater trochanteric region bilaterally.     XR R femur 3/18: No acute fracture or dislocation. Some sclerotic changes related to the greater trochanteric region.     XR chest 3/18: No focal infiltrate or congestion. Some elevation of the left hemidiaphragm noted. Regional osseous structures appropriate for age.     Cardiac Testing:  EKG 3/18: sinus tach    Meds:  MEDICATIONS  (STANDING):  enoxaparin Injectable 40 milliGRAM(s) SubCutaneous every 24 hours  ezetimibe 10 milliGRAM(s) Oral daily  famotidine    Tablet 20 milliGRAM(s) Oral two times a day  folic acid 1 milliGRAM(s) Oral daily  lactated ringers. 1000 milliLiter(s) (200 mL/Hr) IV Continuous <Continuous>  losartan 100 milliGRAM(s) Oral daily  metoprolol succinate  milliGRAM(s) Oral daily  metoprolol succinate ER 50 milliGRAM(s) Oral at bedtime  mirtazapine 7.5 milliGRAM(s) Oral at bedtime  multivitamin/minerals 1 Tablet(s) Oral daily  pantoprazole    Tablet 40 milliGRAM(s) Oral two times a day  rosuvastatin 10 milliGRAM(s) Oral at bedtime  thiamine Injectable 250 milliGRAM(s) IV Push daily    MEDICATIONS  (PRN):  acetaminophen     Tablet .. 650 milliGRAM(s) Oral every 6 hours PRN Temp greater or equal to 38C (100.4F), Mild Pain (1 - 3)  aluminum hydroxide/magnesium hydroxide/simethicone Suspension 30 milliLiter(s) Oral every 4 hours PRN Dyspepsia  melatonin 3 milliGRAM(s) Oral at bedtime PRN Insomnia  ondansetron Injectable 4 milliGRAM(s) IV Push every 8 hours PRN Nausea and/or Vomiting

## 2025-04-03 DIAGNOSIS — E83.42 HYPOMAGNESEMIA: ICD-10-CM

## 2025-04-03 DIAGNOSIS — D63.8 ANEMIA IN OTHER CHRONIC DISEASES CLASSIFIED ELSEWHERE: ICD-10-CM

## 2025-04-03 DIAGNOSIS — F10.939 ALCOHOL USE, UNSPECIFIED WITH WITHDRAWAL, UNSPECIFIED: ICD-10-CM

## 2025-04-03 DIAGNOSIS — R26.81 UNSTEADINESS ON FEET: ICD-10-CM

## 2025-04-03 DIAGNOSIS — K21.9 GASTRO-ESOPHAGEAL REFLUX DISEASE WITHOUT ESOPHAGITIS: ICD-10-CM

## 2025-04-03 DIAGNOSIS — R74.01 ELEVATION OF LEVELS OF LIVER TRANSAMINASE LEVELS: ICD-10-CM

## 2025-04-03 DIAGNOSIS — N39.0 URINARY TRACT INFECTION, SITE NOT SPECIFIED: ICD-10-CM

## 2025-04-03 DIAGNOSIS — F09 UNSPECIFIED MENTAL DISORDER DUE TO KNOWN PHYSIOLOGICAL CONDITION: ICD-10-CM

## 2025-04-03 DIAGNOSIS — Z88.5 ALLERGY STATUS TO NARCOTIC AGENT: ICD-10-CM
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
## 2025-04-03 DIAGNOSIS — E44.0 MODERATE PROTEIN-CALORIE MALNUTRITION: ICD-10-CM

## 2025-04-03 DIAGNOSIS — D69.6 THROMBOCYTOPENIA, UNSPECIFIED: ICD-10-CM

## 2025-04-03 DIAGNOSIS — M16.11 UNILATERAL PRIMARY OSTEOARTHRITIS, RIGHT HIP: ICD-10-CM

## 2025-04-03 DIAGNOSIS — I10 ESSENTIAL (PRIMARY) HYPERTENSION: ICD-10-CM

## 2025-04-03 DIAGNOSIS — E78.5 HYPERLIPIDEMIA, UNSPECIFIED: ICD-10-CM
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
## 2025-04-03 DIAGNOSIS — Z90.710 ACQUIRED ABSENCE OF BOTH CERVIX AND UTERUS: ICD-10-CM

## 2025-04-03 DIAGNOSIS — E86.0 DEHYDRATION: ICD-10-CM
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
## 2025-04-03 DIAGNOSIS — R00.0 TACHYCARDIA, UNSPECIFIED: ICD-10-CM

## 2025-04-03 DIAGNOSIS — G93.41 METABOLIC ENCEPHALOPATHY: ICD-10-CM

## 2025-04-03 DIAGNOSIS — E87.6 HYPOKALEMIA: ICD-10-CM

## 2025-06-04 ENCOUNTER — RX RENEWAL (OUTPATIENT)
Age: 80
End: 2025-06-04

## 2025-06-05 ENCOUNTER — APPOINTMENT (OUTPATIENT)
Dept: NEUROLOGY | Facility: CLINIC | Age: 80
End: 2025-06-05

## 2025-06-17 RX ORDER — DENOSUMAB 60 MG/ML
60 INJECTION SUBCUTANEOUS
Qty: 1 | Refills: 0 | Status: ACTIVE | COMMUNITY
Start: 2025-06-17

## 2025-07-07 ENCOUNTER — APPOINTMENT (OUTPATIENT)
Dept: RHEUMATOLOGY | Facility: CLINIC | Age: 80
End: 2025-07-07
Payer: MEDICARE

## 2025-07-07 VITALS
HEIGHT: 61 IN | BODY MASS INDEX: 24.55 KG/M2 | HEART RATE: 97 BPM | OXYGEN SATURATION: 58 % | SYSTOLIC BLOOD PRESSURE: 111 MMHG | DIASTOLIC BLOOD PRESSURE: 72 MMHG | WEIGHT: 130 LBS

## 2025-07-07 PROCEDURE — 99214 OFFICE O/P EST MOD 30 MIN: CPT | Mod: 25

## 2025-07-07 PROCEDURE — 96372 THER/PROPH/DIAG INJ SC/IM: CPT

## 2025-07-07 PROCEDURE — 36415 COLL VENOUS BLD VENIPUNCTURE: CPT

## 2025-07-07 RX ORDER — DENOSUMAB 60 MG/ML
60 INJECTION SUBCUTANEOUS
Qty: 0 | Refills: 0 | Status: COMPLETED | OUTPATIENT
Start: 2025-07-07

## 2025-07-07 RX ADMIN — DENOSUMAB 0 MG/ML: 60 INJECTION SUBCUTANEOUS at 00:00

## 2025-07-08 LAB
25(OH)D3 SERPL-MCNC: 46.6 NG/ML
ALBUMIN SERPL ELPH-MCNC: 4 G/DL
ALP BLD-CCNC: 50 U/L
ALT SERPL-CCNC: 14 U/L
ANION GAP SERPL CALC-SCNC: 12 MMOL/L
AST SERPL-CCNC: 20 U/L
BILIRUB SERPL-MCNC: 0.3 MG/DL
BUN SERPL-MCNC: 17 MG/DL
CALCIUM SERPL-MCNC: 10.4 MG/DL
CHLORIDE SERPL-SCNC: 102 MMOL/L
CO2 SERPL-SCNC: 24 MMOL/L
CREAT SERPL-MCNC: 1.36 MG/DL
EGFRCR SERPLBLD CKD-EPI 2021: 40 ML/MIN/1.73M2
GLUCOSE SERPL-MCNC: 108 MG/DL
POTASSIUM SERPL-SCNC: 5.1 MMOL/L
PROT SERPL-MCNC: 6.3 G/DL
SODIUM SERPL-SCNC: 138 MMOL/L